# Patient Record
Sex: MALE | Race: BLACK OR AFRICAN AMERICAN | Employment: PART TIME | URBAN - METROPOLITAN AREA
[De-identification: names, ages, dates, MRNs, and addresses within clinical notes are randomized per-mention and may not be internally consistent; named-entity substitution may affect disease eponyms.]

---

## 2022-10-16 ENCOUNTER — HOSPITAL ENCOUNTER (EMERGENCY)
Age: 52
Discharge: HOME | End: 2022-10-16
Attending: EMERGENCY MEDICINE
Payer: MEDICAID

## 2022-10-16 ENCOUNTER — APPOINTMENT (OUTPATIENT)
Dept: GENERAL RADIOLOGY | Age: 52
End: 2022-10-16
Attending: EMERGENCY MEDICINE
Payer: MEDICAID

## 2022-10-16 VITALS
SYSTOLIC BLOOD PRESSURE: 130 MMHG | OXYGEN SATURATION: 94 % | HEIGHT: 72 IN | RESPIRATION RATE: 20 BRPM | TEMPERATURE: 98.2 F | BODY MASS INDEX: 28.44 KG/M2 | HEART RATE: 113 BPM | DIASTOLIC BLOOD PRESSURE: 96 MMHG | WEIGHT: 210 LBS

## 2022-10-16 DIAGNOSIS — R07.89 ATYPICAL CHEST PAIN: ICD-10-CM

## 2022-10-16 DIAGNOSIS — I49.3 MULTIFOCAL PVCS: ICD-10-CM

## 2022-10-16 DIAGNOSIS — E11.9 TYPE 2 DIABETES MELLITUS WITHOUT COMPLICATION, WITHOUT LONG-TERM CURRENT USE OF INSULIN (HCC): ICD-10-CM

## 2022-10-16 DIAGNOSIS — I50.1 PULMONARY EDEMA CARDIAC CAUSE (HCC): ICD-10-CM

## 2022-10-16 DIAGNOSIS — R06.02 SOB (SHORTNESS OF BREATH): Primary | ICD-10-CM

## 2022-10-16 LAB
ALBUMIN SERPL-MCNC: 3.5 G/DL (ref 3.4–5)
ALBUMIN/GLOB SERPL: 1.1 {RATIO} (ref 0.8–1.7)
ALP SERPL-CCNC: 114 U/L (ref 45–117)
ALT SERPL-CCNC: 55 U/L (ref 16–61)
AMPHET UR QL SCN: NEGATIVE
ANION GAP SERPL CALC-SCNC: 6 MMOL/L (ref 3–18)
APPEARANCE UR: CLEAR
AST SERPL-CCNC: 49 U/L (ref 10–38)
BACTERIA URNS QL MICRO: ABNORMAL /HPF
BARBITURATES UR QL SCN: NEGATIVE
BASOPHILS # BLD: 0 K/UL (ref 0–0.1)
BASOPHILS NFR BLD: 0 % (ref 0–2)
BENZODIAZ UR QL: NEGATIVE
BILIRUB SERPL-MCNC: 0.8 MG/DL (ref 0.2–1)
BILIRUB UR QL: NEGATIVE
BNP SERPL-MCNC: 4728 PG/ML (ref 0–900)
BUN SERPL-MCNC: 10 MG/DL (ref 7–18)
BUN/CREAT SERPL: 14 (ref 12–20)
CALCIUM SERPL-MCNC: 10.2 MG/DL (ref 8.5–10.1)
CANNABINOIDS UR QL SCN: NEGATIVE
CHLORIDE SERPL-SCNC: 107 MMOL/L (ref 100–111)
CK SERPL-CCNC: 1266 U/L (ref 39–308)
CO2 SERPL-SCNC: 26 MMOL/L (ref 21–32)
COCAINE UR QL SCN: NEGATIVE
COLOR UR: YELLOW
CREAT SERPL-MCNC: 0.74 MG/DL (ref 0.6–1.3)
DIFFERENTIAL METHOD BLD: NORMAL
EOSINOPHIL # BLD: 0.2 K/UL (ref 0–0.4)
EOSINOPHIL NFR BLD: 2 % (ref 0–5)
EPITH CASTS URNS QL MICRO: ABNORMAL /LPF (ref 0–5)
ERYTHROCYTE [DISTWIDTH] IN BLOOD BY AUTOMATED COUNT: 14.3 % (ref 11.6–14.5)
GLOBULIN SER CALC-MCNC: 3.3 G/DL (ref 2–4)
GLUCOSE SERPL-MCNC: 115 MG/DL (ref 74–99)
GLUCOSE UR STRIP.AUTO-MCNC: NEGATIVE MG/DL
HCT VFR BLD AUTO: 43 % (ref 36–48)
HDSCOM,HDSCOM: NORMAL
HGB BLD-MCNC: 14.1 G/DL (ref 13–16)
HGB UR QL STRIP: NEGATIVE
IMM GRANULOCYTES # BLD AUTO: 0 K/UL (ref 0–0.04)
IMM GRANULOCYTES NFR BLD AUTO: 0 % (ref 0–0.5)
KETONES UR QL STRIP.AUTO: NEGATIVE MG/DL
LEUKOCYTE ESTERASE UR QL STRIP.AUTO: ABNORMAL
LYMPHOCYTES # BLD: 2 K/UL (ref 0.9–3.6)
LYMPHOCYTES NFR BLD: 21 % (ref 21–52)
MCH RBC QN AUTO: 28.1 PG (ref 24–34)
MCHC RBC AUTO-ENTMCNC: 32.8 G/DL (ref 31–37)
MCV RBC AUTO: 85.8 FL (ref 78–100)
METHADONE UR QL: NEGATIVE
MONOCYTES # BLD: 0.9 K/UL (ref 0.05–1.2)
MONOCYTES NFR BLD: 10 % (ref 3–10)
NEUTS SEG # BLD: 6.3 K/UL (ref 1.8–8)
NEUTS SEG NFR BLD: 66 % (ref 40–73)
NITRITE UR QL STRIP.AUTO: NEGATIVE
NRBC # BLD: 0 K/UL (ref 0–0.01)
NRBC BLD-RTO: 0 PER 100 WBC
OPIATES UR QL: NEGATIVE
PCP UR QL: NEGATIVE
PH UR STRIP: 7.5 [PH] (ref 5–8)
PLATELET # BLD AUTO: 275 K/UL (ref 135–420)
PMV BLD AUTO: 11.8 FL (ref 9.2–11.8)
POTASSIUM SERPL-SCNC: 3.8 MMOL/L (ref 3.5–5.5)
PROT SERPL-MCNC: 6.8 G/DL (ref 6.4–8.2)
PROT UR STRIP-MCNC: 30 MG/DL
RBC # BLD AUTO: 5.01 M/UL (ref 4.35–5.65)
RBC #/AREA URNS HPF: NEGATIVE /HPF (ref 0–5)
SODIUM SERPL-SCNC: 139 MMOL/L (ref 136–145)
SP GR UR REFRACTOMETRY: 1.02 (ref 1–1.03)
TROPONIN-HIGH SENSITIVITY: 75 NG/L (ref 0–78)
TROPONIN-HIGH SENSITIVITY: 75 NG/L (ref 0–78)
UROBILINOGEN UR QL STRIP.AUTO: 1 EU/DL (ref 0.2–1)
WBC # BLD AUTO: 9.4 K/UL (ref 4.6–13.2)
WBC URNS QL MICRO: ABNORMAL /HPF (ref 0–5)

## 2022-10-16 PROCEDURE — 74011250636 HC RX REV CODE- 250/636: Performed by: EMERGENCY MEDICINE

## 2022-10-16 PROCEDURE — 71045 X-RAY EXAM CHEST 1 VIEW: CPT

## 2022-10-16 PROCEDURE — 80307 DRUG TEST PRSMV CHEM ANLYZR: CPT

## 2022-10-16 PROCEDURE — 99285 EMERGENCY DEPT VISIT HI MDM: CPT

## 2022-10-16 PROCEDURE — 82550 ASSAY OF CK (CPK): CPT

## 2022-10-16 PROCEDURE — 93005 ELECTROCARDIOGRAM TRACING: CPT

## 2022-10-16 PROCEDURE — 85025 COMPLETE CBC W/AUTO DIFF WBC: CPT

## 2022-10-16 PROCEDURE — 96374 THER/PROPH/DIAG INJ IV PUSH: CPT

## 2022-10-16 PROCEDURE — 84484 ASSAY OF TROPONIN QUANT: CPT

## 2022-10-16 PROCEDURE — 83880 ASSAY OF NATRIURETIC PEPTIDE: CPT

## 2022-10-16 PROCEDURE — 74011250637 HC RX REV CODE- 250/637: Performed by: EMERGENCY MEDICINE

## 2022-10-16 PROCEDURE — 81001 URINALYSIS AUTO W/SCOPE: CPT

## 2022-10-16 PROCEDURE — 80053 COMPREHEN METABOLIC PANEL: CPT

## 2022-10-16 RX ORDER — FUROSEMIDE 40 MG/1
20 TABLET ORAL
Status: COMPLETED | OUTPATIENT
Start: 2022-10-16 | End: 2022-10-16

## 2022-10-16 RX ORDER — FUROSEMIDE 40 MG/1
20 TABLET ORAL DAILY
Qty: 30 TABLET | Refills: 0 | Status: SHIPPED | OUTPATIENT
Start: 2022-10-16 | End: 2022-10-30

## 2022-10-16 RX ORDER — FUROSEMIDE 40 MG/1
20 TABLET ORAL DAILY
Qty: 30 TABLET | Refills: 0 | Status: SHIPPED | OUTPATIENT
Start: 2022-10-16 | End: 2022-11-05

## 2022-10-16 RX ORDER — GUAIFENESIN 100 MG/5ML
324 LIQUID (ML) ORAL
Status: COMPLETED | OUTPATIENT
Start: 2022-10-16 | End: 2022-10-16

## 2022-10-16 RX ORDER — KETOROLAC TROMETHAMINE 30 MG/ML
30 INJECTION, SOLUTION INTRAMUSCULAR; INTRAVENOUS
Status: COMPLETED | OUTPATIENT
Start: 2022-10-16 | End: 2022-10-16

## 2022-10-16 RX ORDER — METFORMIN HYDROCHLORIDE 500 MG/1
500 TABLET ORAL 2 TIMES DAILY WITH MEALS
Qty: 60 TABLET | Refills: 0 | Status: SHIPPED | OUTPATIENT
Start: 2022-10-16 | End: 2022-12-01

## 2022-10-16 RX ORDER — METFORMIN HYDROCHLORIDE 500 MG/1
TABLET ORAL 2 TIMES DAILY WITH MEALS
COMMUNITY
End: 2022-10-16 | Stop reason: SDUPTHER

## 2022-10-16 RX ADMIN — KETOROLAC TROMETHAMINE 30 MG: 30 INJECTION, SOLUTION INTRAMUSCULAR at 19:48

## 2022-10-16 RX ADMIN — FUROSEMIDE 20 MG: 40 TABLET ORAL at 21:25

## 2022-10-16 RX ADMIN — ASPIRIN 324 MG: 81 TABLET, CHEWABLE ORAL at 19:47

## 2022-10-16 NOTE — ED TRIAGE NOTES
Pt arrives w c/o shortness of breath and pain in right neck that radiates down through shoulder and arm and per patient shoots down leg. Pt states he started smoking 2 weeks ago.

## 2022-10-17 NOTE — ED PROVIDER NOTES
EMERGENCY DEPARTMENT HISTORY AND PHYSICAL EXAM    Date: 10/16/2022  Patient Name: Ruben Bello    History of Presenting Illness     Chief Complaint   Patient presents with    Shortness of Breath         History Provided By: Patient    Additional His half tory (Context):   7:33 PM  Ruben Bello is a 45 y.o. male with PMHX of diabetes, edema who presents to the emergency department C/O dyspnea. Patient states he has been having pain on the right side of his shoulder and his neck. He traces his fingers from his trapezius to his shoulder and runs down towards his hip points to his knee. Symptoms been going on for some time although he cannot characterize them fully. He describes it as a sharp shooting pain which lasts a few seconds then resolves. He has a cough. He states he has been smoking again started 2 weeks ago after previous history of smoking about half pack a day. He denies any fevers or chills or productive sputum. He denies any known cardiac history. He acknowledges doing extra physical work recently. He does not have any specific injuries. He denies use of any illegal drug use. Social History  Patient knowledges smoking cigarettes, denies alcohol or drugs. Family History  No specific family history. PCP: None    Current Facility-Administered Medications   Medication Dose Route Frequency Provider Last Rate Last Admin    furosemide (LASIX) tablet 20 mg  20 mg Oral NOW Radha Finn MD         Current Outpatient Medications   Medication Sig Dispense Refill    furosemide (Lasix) 40 mg tablet Take 0.5 Tablets by mouth daily for 20 days. 30 Tablet 0    metFORMIN (GLUCOPHAGE) 500 mg tablet Take 1 Tablet by mouth two (2) times daily (with meals). 60 Tablet 0    furosemide (Lasix) 40 mg tablet Take 0.5 Tablets by mouth daily for 30 days.  30 Tablet 0       Past History     Past Medical History:  Past Medical History:   Diagnosis Date    Diabetes [facility])        Past Surgical History:  History reviewed. No pertinent surgical history. Family History:  History reviewed. No pertinent family history. Social History:  Social History     Tobacco Use    Smoking status: Every Day     Packs/day: 0.50     Types: Cigarettes    Smokeless tobacco: Never   Substance Use Topics    Alcohol use: Not Currently    Drug use: Not Currently       Allergies:  No Known Allergies      Review of Systems   Review of Systems   Musculoskeletal:  Positive for arthralgias, myalgias and neck pain. Neck and shoulder and hips and thighs   All other systems reviewed and are negative. Physical Exam     Vitals:    10/16/22 1752 10/16/22 1902 10/16/22 2002   BP: 130/88 (!) 132/93 (!) 130/96   Pulse: (!) 50 78 (!) 113   Resp: 18 14 20   Temp: 98.2 °F (36.8 °C)     SpO2: 92% 96% 94%   Weight: 95.3 kg (210 lb)     Height: 6' (1.829 m)       Physical Exam  Vitals and nursing note reviewed. Constitutional:       General: He is not in acute distress. Appearance: He is well-developed. He is not diaphoretic. HENT:      Head: Normocephalic and atraumatic. Eyes:      General: No scleral icterus. Extraocular Movements:      Right eye: Normal extraocular motion. Left eye: Normal extraocular motion. Conjunctiva/sclera: Conjunctivae normal.      Pupils: Pupils are equal, round, and reactive to light. Neck:      Trachea: Trachea and phonation normal. No tracheal deviation. Comments: Tender trapezius working toward shoulder musculature. He does not have neck stiffness. Cardiovascular:      Rate and Rhythm: Normal rate and regular rhythm. Heart sounds: Normal heart sounds. Pulmonary:      Effort: Pulmonary effort is normal. No respiratory distress. Breath sounds: Normal breath sounds. No stridor. Abdominal:      General: Bowel sounds are normal. There is no distension. Palpations: Abdomen is soft. Tenderness: There is no abdominal tenderness. There is no rebound.    Musculoskeletal: General: No tenderness. Normal range of motion. Arms:       Cervical back: Normal range of motion. No edema, erythema, signs of trauma or rigidity. Pain with movement and muscular tenderness present. No spinous process tenderness. Normal range of motion. Legs:       Comments: Grossly unremarkable without abnormalities. He has full range of motion without shoulder joint and hip joint but with some muscular stiffness. There is no palpable fasciculations or spasms. Range of motion is full including internal and external rotation of shoulder by hip flexion extension. Distally his radial median ulnar nerve function are normal.  Dorsi and plantar flexion are normal.   Skin:     General: Skin is warm and dry. Capillary Refill: Capillary refill takes less than 2 seconds. Findings: No erythema or rash. Neurological:      Mental Status: He is alert and oriented to person, place, and time. GCS: GCS eye subscore is 4. GCS verbal subscore is 5. GCS motor subscore is 6. Cranial Nerves: Cranial nerves 2-12 are intact. No cranial nerve deficit. Motor: Motor function is intact. No weakness. Coordination: Coordination is intact. Gait: Gait is intact. Psychiatric:         Mood and Affect: Mood normal.         Behavior: Behavior normal.         Thought Content:  Thought content normal.         Judgment: Judgment normal.     Diagnostic Study Results     Labs -  Recent Results (from the past 24 hour(s))   CBC WITH AUTOMATED DIFF    Collection Time: 10/16/22  6:10 PM   Result Value Ref Range    WBC 9.4 4.6 - 13.2 K/uL    RBC 5.01 4.35 - 5.65 M/uL    HGB 14.1 13.0 - 16.0 g/dL    HCT 43.0 36.0 - 48.0 %    MCV 85.8 78.0 - 100.0 FL    MCH 28.1 24.0 - 34.0 PG    MCHC 32.8 31.0 - 37.0 g/dL    RDW 14.3 11.6 - 14.5 %    PLATELET 347 823 - 048 K/uL    MPV 11.8 9.2 - 11.8 FL    NRBC 0.0 0  WBC    ABSOLUTE NRBC 0.00 0.00 - 0.01 K/uL    NEUTROPHILS 66 40 - 73 %    LYMPHOCYTES 21 21 - 52 %    MONOCYTES 10 3 - 10 %    EOSINOPHILS 2 0 - 5 %    BASOPHILS 0 0 - 2 %    IMMATURE GRANULOCYTES 0 0.0 - 0.5 %    ABS. NEUTROPHILS 6.3 1.8 - 8.0 K/UL    ABS. LYMPHOCYTES 2.0 0.9 - 3.6 K/UL    ABS. MONOCYTES 0.9 0.05 - 1.2 K/UL    ABS. EOSINOPHILS 0.2 0.0 - 0.4 K/UL    ABS. BASOPHILS 0.0 0.0 - 0.1 K/UL    ABS. IMM. GRANS. 0.0 0.00 - 0.04 K/UL    DF AUTOMATED     METABOLIC PANEL, COMPREHENSIVE    Collection Time: 10/16/22  6:10 PM   Result Value Ref Range    Sodium 139 136 - 145 mmol/L    Potassium 3.8 3.5 - 5.5 mmol/L    Chloride 107 100 - 111 mmol/L    CO2 26 21 - 32 mmol/L    Anion gap 6 3.0 - 18 mmol/L    Glucose 115 (H) 74 - 99 mg/dL    BUN 10 7.0 - 18 MG/DL    Creatinine 0.74 0.6 - 1.3 MG/DL    BUN/Creatinine ratio 14 12 - 20      eGFR >60 >60 ml/min/1.73m2    Calcium 10.2 (H) 8.5 - 10.1 MG/DL    Bilirubin, total 0.8 0.2 - 1.0 MG/DL    ALT (SGPT) 55 16 - 61 U/L    AST (SGOT) 49 (H) 10 - 38 U/L    Alk.  phosphatase 114 45 - 117 U/L    Protein, total 6.8 6.4 - 8.2 g/dL    Albumin 3.5 3.4 - 5.0 g/dL    Globulin 3.3 2.0 - 4.0 g/dL    A-G Ratio 1.1 0.8 - 1.7     TROPONIN-HIGH SENSITIVITY    Collection Time: 10/16/22  6:10 PM   Result Value Ref Range    Troponin-High Sensitivity 75 0 - 78 ng/L   NT-PRO BNP    Collection Time: 10/16/22  6:10 PM   Result Value Ref Range    NT pro-BNP 4,728 (H) 0 - 900 PG/ML   URINALYSIS W/ RFLX MICROSCOPIC    Collection Time: 10/16/22  6:10 PM   Result Value Ref Range    Color YELLOW      Appearance CLEAR      Specific gravity 1.016 1.005 - 1.030      pH (UA) 7.5 5.0 - 8.0      Protein 30 (A) NEG mg/dL    Glucose Negative NEG mg/dL    Ketone Negative NEG mg/dL    Bilirubin Negative NEG      Blood Negative NEG      Urobilinogen 1.0 0.2 - 1.0 EU/dL    Nitrites Negative NEG      Leukocyte Esterase SMALL (A) NEG     DRUG SCREEN, URINE    Collection Time: 10/16/22  6:10 PM   Result Value Ref Range    BENZODIAZEPINES Negative NEG      BARBITURATES Negative NEG      THC (TH-CANNABINOL) Negative NEG      OPIATES Negative NEG      PCP(PHENCYCLIDINE) Negative NEG      COCAINE Negative NEG      AMPHETAMINES Negative NEG      METHADONE Negative NEG      HDSCOM (NOTE)    URINE MICROSCOPIC ONLY    Collection Time: 10/16/22  6:10 PM   Result Value Ref Range    WBC 11 to 20 0 - 5 /hpf    RBC Negative 0 - 5 /hpf    Epithelial cells 0 to 3 0 - 5 /lpf    Bacteria FEW (A) NEG /hpf   EKG, 12 LEAD, INITIAL    Collection Time: 10/16/22  6:10 PM   Result Value Ref Range    Ventricular Rate 83 BPM    Atrial Rate 83 BPM    P-R Interval 132 ms    QRS Duration 94 ms    Q-T Interval 364 ms    QTC Calculation (Bezet) 427 ms    Calculated P Axis 10 degrees    Calculated R Axis 6 degrees    Calculated T Axis 14 degrees    Diagnosis       Sinus rhythm with frequent and consecutive premature ventricular complexes  Nonspecific T wave abnormality  Abnormal ECG  No previous ECGs available     EKG, 12 LEAD, SUBSEQUENT    Collection Time: 10/16/22  7:56 PM   Result Value Ref Range    Ventricular Rate 111 BPM    Atrial Rate 111 BPM    P-R Interval 136 ms    QRS Duration 94 ms    Q-T Interval 374 ms    QTC Calculation (Bezet) 508 ms    Calculated P Axis 57 degrees    Calculated R Axis 40 degrees    Calculated T Axis 36 degrees    Diagnosis       Sinus tachycardia with occasional premature ventricular complexes  Minimal voltage criteria for LVH, may be normal variant ( Sokolow-Bob )  Nonspecific T wave abnormality  Abnormal ECG  When compared with ECG of 16-OCT-2022 18:10,  No significant change was found     CK    Collection Time: 10/16/22  8:00 PM   Result Value Ref Range    CK 1,266 (H) 39 - 308 U/L   TROPONIN-HIGH SENSITIVITY    Collection Time: 10/16/22  8:00 PM   Result Value Ref Range    Troponin-High Sensitivity 75 0 - 78 ng/L        Radiologic Studies -   XR CHEST PORT   Final Result      Cardiogenic pulmonary edema           CT Results  (Last 48 hours)      None          CXR Results  (Last 48 hours) 10/16/22 1825  XR CHEST PORT Final result    Impression:      Cardiogenic pulmonary edema           Narrative:  EXAM: CHEST RADIOGRAPH       CLINICAL INDICATION/HISTORY: Shortness of breath   -Additional: None       COMPARISON: None       TECHNIQUE: Portable frontal view of the chest       _______________       FINDINGS:       SUPPORT DEVICES: None. HEART AND MEDIASTINUM: The heart is enlarged. Remaining mediastinal contour is   unremarkable. LUNGS AND PLEURAL SPACES: There is perihilar and peripheral consolidation. BONY THORAX AND SOFT TISSUES: Unremarkable.       _______________                   Medications given in the ED-  Medications   furosemide (LASIX) tablet 20 mg (has no administration in time range)   ketorolac (TORADOL) injection 30 mg (30 mg IntraVENous Given 10/16/22 1948)   aspirin chewable tablet 324 mg (324 mg Oral Given 10/16/22 1947)         Medical Decision Making   I am the first provider for this patient. I reviewed the vital signs, available nursing notes, past medical history, past surgical history, family history and social history. Vital Signs-Reviewed the patient's vital signs. Pulse Oximetry Analysis -94% on room air    Cardiac Monitor:  Rate: 88 bpm  Rhythm: Sinus rhythm    EKG interpretation: (Preliminary)  6:10 PM    Preliminary EKG read by daytime staff has been unremarkable for acute ischemic event  7:33 PM  Normal sinus rhythm with occasional PVCs, rate 83, nonspecific ST changes, QTC is 427. EKG read by Damien Kilgore MD      Records Reviewed: NURSING NOTES AND PREVIOUS MEDICAL RECORDS    Provider Notes (Medical Decision Making):   Patient describes sharp pains after doing extra physical work. He is quite physically fit. This can be brought in associated with increased CPK, an exertional form of rhabdomyolysis. We can hydrate him however he also looks to have findings consistent with CHF possibly associated with years of physical work.   His blood pressure is a bit up but not bad. He denies drug use and his drug screens are negative. This could be primary cardiomegaly however he has no prior history in the past.  Blood sugars have been managed reasonably well in his past using metformin. Doubt this represents diabetes associated complication. I discussed ways with him for remove fluid overload however he will need to hydrate orally for his CPK. He also merits referral to cardiology. We discussed possibility for admission versus outpatient management which he prefers. This is part is reasonable does not reprimand AMA. He had repeated troponins at the upper end of acceptable normal limits which did not rise. Cardiac monitor demonstrated rate of 113 however this is picking up on PVCs indeed he did not have a tachycardia. Will refer to cardiology outpatient management. Procedures:  Procedures    ED Course:   7:21 PM: Initial assessment performed. The patients presenting problems have been discussed, and they are in agreement with the care plan formulated and outlined with them. I have encouraged them to ask questions as they arise throughout their visit. Diagnosis and Disposition       DISCHARGE NOTE:  9:15 PM  Melinda Marta  results have been reviewed with him. He has been counseled regarding his diagnosis, treatment, and plan. He verbally conveys understanding and agreement of the signs, symptoms, diagnosis, treatment and prognosis and additionally agrees to follow up as discussed. He also agrees with the care-plan and conveys that all of his questions have been answered. I have also provided discharge instructions for him that include: educational information regarding their diagnosis and treatment, and list of reasons why they would want to return to the ED prior to their follow-up appointment, should his condition change. He has been provided with education for proper emergency department utilization. CLINICAL IMPRESSION:    1. SOB (shortness of breath)    2. Pulmonary edema cardiac cause ([address]    3. Type 2 diabetes mellitus without complication, without long-term current use of insulin ([facility]    4. Atypical chest pain    5. Multifocal PVCs        PLAN:  1. D/C Home  2. Current Discharge Medication List        START taking these medications    Details   !! furosemide (Lasix) 40 mg tablet Take 0.5 Tablets by mouth daily for 20 days. Qty: 30 Tablet, Refills: 0  Start date: 10/16/2022, End date: 11/5/2022      !! furosemide (Lasix) 40 mg tablet Take 0.5 Tablets by mouth daily for 30 days. Qty: 30 Tablet, Refills: 0  Start date: 10/16/2022, End date: 11/15/2022       !! - Potential duplicate medications found. Please discuss with provider. CONTINUE these medications which have CHANGED    Details   metFORMIN (GLUCOPHAGE) 500 mg tablet Take 1 Tablet by mouth two (2) times daily (with meals). Qty: 60 Tablet, Refills: 0  Start date: 10/16/2022           3. Follow-up Information       Follow up With Specialties Details Why Contact Info    Flex Seals MD Cardiovascular Disease Physician, Internal Medicine Physician In 2 weeks  [address] [place] [place] [address]  [place] [address]  [phone]          _______________________________    This note was partially transcribed via voice recognition software. Although efforts have been made to catch any discrepancies, it may contain sound alike words, grammatical errors, or nonsensical words.

## 2022-10-19 LAB
ATRIAL RATE: 111 BPM
ATRIAL RATE: 83 BPM
CALCULATED P AXIS, ECG09: 10 DEGREES
CALCULATED P AXIS, ECG09: 57 DEGREES
CALCULATED R AXIS, ECG10: 40 DEGREES
CALCULATED R AXIS, ECG10: 6 DEGREES
CALCULATED T AXIS, ECG11: 14 DEGREES
CALCULATED T AXIS, ECG11: 36 DEGREES
DIAGNOSIS, 93000: NORMAL
DIAGNOSIS, 93000: NORMAL
P-R INTERVAL, ECG05: 132 MS
P-R INTERVAL, ECG05: 136 MS
Q-T INTERVAL, ECG07: 364 MS
Q-T INTERVAL, ECG07: 374 MS
QRS DURATION, ECG06: 94 MS
QRS DURATION, ECG06: 94 MS
QTC CALCULATION (BEZET), ECG08: 427 MS
QTC CALCULATION (BEZET), ECG08: 508 MS
VENTRICULAR RATE, ECG03: 111 BPM
VENTRICULAR RATE, ECG03: 83 BPM

## 2022-10-30 ENCOUNTER — HOSPITAL ENCOUNTER (EMERGENCY)
Age: 52
Discharge: HOME | End: 2022-10-30
Attending: EMERGENCY MEDICINE

## 2022-10-30 ENCOUNTER — APPOINTMENT (OUTPATIENT)
Dept: GENERAL RADIOLOGY | Age: 52
End: 2022-10-30
Attending: PHYSICIAN ASSISTANT

## 2022-10-30 VITALS
TEMPERATURE: 97.1 F | SYSTOLIC BLOOD PRESSURE: 99 MMHG | RESPIRATION RATE: 19 BRPM | HEIGHT: 73 IN | BODY MASS INDEX: 28.49 KG/M2 | OXYGEN SATURATION: 96 % | WEIGHT: 215 LBS | HEART RATE: 97 BPM | DIASTOLIC BLOOD PRESSURE: 73 MMHG

## 2022-10-30 DIAGNOSIS — M62.838 MUSCLE SPASM: ICD-10-CM

## 2022-10-30 DIAGNOSIS — M54.2 CERVICAL PAIN (NECK): Primary | ICD-10-CM

## 2022-10-30 PROCEDURE — 72050 X-RAY EXAM NECK SPINE 4/5VWS: CPT

## 2022-10-30 PROCEDURE — 99283 EMERGENCY DEPT VISIT LOW MDM: CPT

## 2022-10-30 RX ORDER — NAPROXEN 500 MG/1
500 TABLET ORAL 2 TIMES DAILY WITH MEALS
Qty: 20 TABLET | Refills: 0 | Status: SHIPPED | OUTPATIENT
Start: 2022-10-30 | End: 2022-11-09

## 2022-10-30 RX ORDER — METHOCARBAMOL 750 MG/1
750 TABLET, FILM COATED ORAL
Qty: 20 TABLET | Refills: 0 | Status: SHIPPED | OUTPATIENT
Start: 2022-10-30 | End: 2022-12-01

## 2022-10-30 NOTE — ED PROVIDER NOTES
EMERGENCY DEPARTMENT HISTORY AND PHYSICAL EXAM    Date: 10/30/2022  Patient Name: Arian Magana    History of Presenting Illness     Time Seen:6:37 PM    Chief Complaint   Patient presents with    Neck Pain       History Provided By: Patient    Additional History (Context):   Arian Magana is a 45 y.o. male who presents to the emergency room with c/o neck pain for the last month. Denies any injury. However he does believe that he may have slept on it wrong that initiated the discomfort. Also works as a . Often has his head and neck in awkward positions. Pain is mostly along the right side of neck with radiation out towards the right shoulder. Aching pain. Worse with movement. Denies any numbness, tingling or weakness in his upper extremities. No history of bulging disc or herniated disks in his neck. No headaches. PCP: None    Current Outpatient Medications   Medication Sig Dispense Refill    naproxen (Naprosyn) 500 mg tablet Take 1 Tablet by mouth two (2) times daily (with meals) for 10 days. 20 Tablet 0    methocarbamoL (Robaxin-750) 750 mg tablet Take 1 Tablet by mouth every six to eight (6-8) hours as needed for Muscle Spasm(s). 20 Tablet 0    furosemide (Lasix) 40 mg tablet Take 0.5 Tablets by mouth daily for 20 days. 30 Tablet 0    metFORMIN (GLUCOPHAGE) 500 mg tablet Take 1 Tablet by mouth two (2) times daily (with meals). 60 Tablet 0       Past History     Past Medical History:  Past Medical History:   Diagnosis Date    Diabetes ([address]        Past Surgical History:  History reviewed. No pertinent surgical history. Family History:  History reviewed. No pertinent family history.     Social History:  Social History     Tobacco Use    Smoking status: Every Day     Packs/day: 1.00     Types: Cigarettes    Smokeless tobacco: Never   Substance Use Topics    Alcohol use: Not Currently    Drug use: Not Currently       Allergies:  No Known Allergies      Review of Systems   Review of Systems Musculoskeletal:  Positive for neck pain and neck stiffness. Negative for back pain. Neurological:  Negative for dizziness, weakness, numbness and headaches. All other systems reviewed and are negative. Physical Exam     Vitals:    10/30/22 1728   BP: 99/73   Pulse: 97   Resp: 19   Temp: 97.1 °F (36.2 °C)   SpO2: 96%   Weight: 97.5 kg (215 lb)   Height: 6' 1\" (1.854 m)     Physical Exam  Vitals and nursing note reviewed. Constitutional:       Appearance: Normal appearance. He is normal weight. HENT:      Head: Normocephalic and atraumatic. Neck:        Comments: Tenderness to palpation of the cervical spinous processes and extending out into the right paracervical musculature, right trapezius muscle extending out towards the shoulder. Somewhat diminished range of motion of the neck secondary to pain and stiffness. Patient has full range of motion in both upper extremities with strength 5/5 equal bilaterally. Cardiovascular:      Rate and Rhythm: Normal rate and regular rhythm. Heart sounds: Normal heart sounds. Pulmonary:      Effort: Pulmonary effort is normal.      Breath sounds: Normal breath sounds. Musculoskeletal:      Cervical back: No signs of trauma, rigidity, torticollis or crepitus. Pain with movement, spinous process tenderness and muscular tenderness present. Decreased range of motion. Skin:     General: Skin is warm and dry. Neurological:      Mental Status: He is alert and oriented to person, place, and time. Nursing note and vitals reviewed      Diagnostic Study Results     Labs -   No results found for this or any previous visit (from the past 24 hour(s)). Radiologic Studies   XR SPINE CERV 4 OR 5 V    (Results Pending)     CT Results  (Last 48 hours)      None          CXR Results  (Last 48 hours)      None              Medical Decision Making   I am the first provider for this patient.     I reviewed the vital signs, available nursing notes, past medical history, past surgical history, family history and social history. Vital Signs-Reviewed the patient's vital signs. Records Reviewed: Nursing Notes    DDX:  Cervical degenerative arthritis, cervical muscle spasm, degenerative disc disease. Doubt radicular pain    Procedures:  Procedures    ED Course:   Initial assessment performed. The patients presenting problems have been discussed, and they are in agreement with the care plan formulated and outlined with them. I have encouraged them to ask questions as they arise throughout their visit. ED Physician Discussion Note:   Wet reading on x-ray shows evidence of reversal of normal curvature of the cervical spine concerning for muscle spasms. There is also some evidence of degenerative changes in the neck. Patient was informed of his x-ray results    Plan will be to place patient on anti-inflammatory muscle relaxer. He wants to be referred to see a back specialist because of the length of time he has been dealing with the neck pain. Patient is already been doing symptomatic relief at home. We will have him continue heat, ice, gentle stretching exercise. Works as a . Did not want a work note. Zander Lay PA-C, am the primary clinician on record for this patient. Diagnosis and Disposition       DISCHARGE NOTE:  Noah Myers  results have been reviewed with him. He has been counseled regarding his diagnosis, treatment, and plan. He verbally conveys understanding and agreement of the signs, symptoms, diagnosis, treatment and prognosis and additionally agrees to follow up as discussed. He also agrees with the care-plan and conveys that all of his questions have been answered.   I have also provided discharge instructions for him that include: educational information regarding their diagnosis and treatment, and list of reasons why they would want to return to the ED prior to their follow-up appointment, should his condition change. He has been provided with education for proper emergency department utilization. CLINICAL IMPRESSION:    1. Cervical pain (neck)    2. Muscle spasm        PLAN:  1. D/C Home  2. Current Discharge Medication List        START taking these medications    Details   naproxen (Naprosyn) 500 mg tablet Take 1 Tablet by mouth two (2) times daily (with meals) for 10 days. Qty: 20 Tablet, Refills: 0  Start date: 10/30/2022, End date: 11/9/2022      methocarbamoL (Robaxin-750) 750 mg tablet Take 1 Tablet by mouth every six to eight (6-8) hours as needed for Muscle Spasm(s). Qty: 20 Tablet, Refills: 0  Start date: 10/30/2022           3. Follow-up Information       Follow up With Specialties Details Why Contact Info    Laura Jacques MD Orthopedic Surgery Schedule an appointment as soon as possible for a visit   [address] [phone]      [facility] EMERGENCY DEPT Emergency Medicine  If symptoms worsen, As needed [address] [phone]          ____________________________________     Please note that this dictation was completed with [employer], the computer voice recognition software. Quite often unanticipated grammatical, syntax, homophones, and other interpretive errors are inadvertently transcribed by the computer software. Please disregard these errors. Please excuse any errors that have escaped final proofreading.

## 2022-10-30 NOTE — DISCHARGE INSTRUCTIONS
Rest  Ice/heat  Gentle stretching and exercise  Medication as prescribed for inflammation and muscle spasm  Referral to see orthopedic surgery

## 2022-11-27 ENCOUNTER — APPOINTMENT (OUTPATIENT)
Dept: CT IMAGING | Age: 52
DRG: 286 | End: 2022-11-27
Attending: PHYSICIAN ASSISTANT

## 2022-11-27 ENCOUNTER — HOSPITAL ENCOUNTER (INPATIENT)
Age: 52
LOS: 4 days | Discharge: HOME | DRG: 286 | End: 2022-12-01
Attending: STUDENT IN AN ORGANIZED HEALTH CARE EDUCATION/TRAINING PROGRAM | Admitting: INTERNAL MEDICINE

## 2022-11-27 ENCOUNTER — APPOINTMENT (OUTPATIENT)
Dept: GENERAL RADIOLOGY | Age: 52
DRG: 286 | End: 2022-11-27
Attending: STUDENT IN AN ORGANIZED HEALTH CARE EDUCATION/TRAINING PROGRAM

## 2022-11-27 DIAGNOSIS — I42.9 CARDIOMYOPATHY (HCC): ICD-10-CM

## 2022-11-27 DIAGNOSIS — I50.9 ACUTE CONGESTIVE HEART FAILURE, UNSPECIFIED HEART FAILURE TYPE (HCC): Primary | ICD-10-CM

## 2022-11-27 DIAGNOSIS — E11.69 TYPE 2 DIABETES MELLITUS WITH OTHER SPECIFIED COMPLICATION, WITHOUT LONG-TERM CURRENT USE OF INSULIN (HCC): ICD-10-CM

## 2022-11-27 DIAGNOSIS — I50.9 CHF (CONGESTIVE HEART FAILURE) (HCC): ICD-10-CM

## 2022-11-27 DIAGNOSIS — I34.0 NONRHEUMATIC MITRAL VALVE REGURGITATION: ICD-10-CM

## 2022-11-27 DIAGNOSIS — R09.02 HYPOXIC: ICD-10-CM

## 2022-11-27 LAB
ALBUMIN SERPL-MCNC: 3.5 G/DL (ref 3.4–5)
ALBUMIN/GLOB SERPL: 0.9 {RATIO} (ref 0.8–1.7)
ALP SERPL-CCNC: 117 U/L (ref 45–117)
ALT SERPL-CCNC: 45 U/L (ref 16–61)
AMPHET UR QL SCN: NEGATIVE
ANION GAP SERPL CALC-SCNC: 5 MMOL/L (ref 3–18)
APPEARANCE UR: CLEAR
ARTERIAL PATENCY WRIST A: POSITIVE
AST SERPL-CCNC: 47 U/L (ref 10–38)
ATRIAL RATE: 100 BPM
BACTERIA URNS QL MICRO: ABNORMAL /HPF
BARBITURATES UR QL SCN: NEGATIVE
BASE EXCESS BLD CALC-SCNC: 0.4 MMOL/L
BASOPHILS # BLD: 0 K/UL (ref 0–0.1)
BASOPHILS NFR BLD: 0 % (ref 0–2)
BDY SITE: ABNORMAL
BENZODIAZ UR QL: NEGATIVE
BILIRUB SERPL-MCNC: 0.9 MG/DL (ref 0.2–1)
BILIRUB UR QL: NEGATIVE
BNP SERPL-MCNC: 4072 PG/ML (ref 0–900)
BUN SERPL-MCNC: 11 MG/DL (ref 7–18)
BUN/CREAT SERPL: 13 (ref 12–20)
CALCIUM SERPL-MCNC: 10.4 MG/DL (ref 8.5–10.1)
CALCULATED P AXIS, ECG09: 71 DEGREES
CALCULATED R AXIS, ECG10: 46 DEGREES
CALCULATED T AXIS, ECG11: 60 DEGREES
CANNABINOIDS UR QL SCN: NEGATIVE
CHLORIDE SERPL-SCNC: 109 MMOL/L (ref 100–111)
CO2 SERPL-SCNC: 26 MMOL/L (ref 21–32)
COCAINE UR QL SCN: NEGATIVE
COLOR UR: YELLOW
CREAT SERPL-MCNC: 0.83 MG/DL (ref 0.6–1.3)
D DIMER PPP FEU-MCNC: 0.65 UG/ML(FEU)
DIAGNOSIS, 93000: NORMAL
DIFFERENTIAL METHOD BLD: NORMAL
EOSINOPHIL # BLD: 0.3 K/UL (ref 0–0.4)
EOSINOPHIL NFR BLD: 4 % (ref 0–5)
EPITH CASTS URNS QL MICRO: ABNORMAL /LPF (ref 0–5)
ERYTHROCYTE [DISTWIDTH] IN BLOOD BY AUTOMATED COUNT: 13.7 % (ref 11.6–14.5)
FLUAV RNA SPEC QL NAA+PROBE: NOT DETECTED
FLUBV RNA SPEC QL NAA+PROBE: NOT DETECTED
GAS FLOW.O2 O2 DELIVERY SYS: ABNORMAL L/MIN
GLOBULIN SER CALC-MCNC: 3.7 G/DL (ref 2–4)
GLUCOSE BLD STRIP.AUTO-MCNC: 139 MG/DL (ref 70–110)
GLUCOSE SERPL-MCNC: 132 MG/DL (ref 74–99)
GLUCOSE UR STRIP.AUTO-MCNC: NEGATIVE MG/DL
HCO3 BLD-SCNC: 24.7 MMOL/L (ref 22–26)
HCT VFR BLD AUTO: 41.6 % (ref 36–48)
HDSCOM,HDSCOM: NORMAL
HGB BLD-MCNC: 13.9 G/DL (ref 13–16)
HGB UR QL STRIP: NEGATIVE
IMM GRANULOCYTES # BLD AUTO: 0 K/UL (ref 0–0.04)
IMM GRANULOCYTES NFR BLD AUTO: 0 % (ref 0–0.5)
KETONES UR QL STRIP.AUTO: NEGATIVE MG/DL
LACTATE BLD-SCNC: 1.81 MMOL/L (ref 0.4–2)
LEUKOCYTE ESTERASE UR QL STRIP.AUTO: ABNORMAL
LYMPHOCYTES # BLD: 1.7 K/UL (ref 0.9–3.6)
LYMPHOCYTES NFR BLD: 22 % (ref 21–52)
MCH RBC QN AUTO: 28.7 PG (ref 24–34)
MCHC RBC AUTO-ENTMCNC: 33.4 G/DL (ref 31–37)
MCV RBC AUTO: 85.8 FL (ref 78–100)
METHADONE UR QL: NEGATIVE
MONOCYTES # BLD: 0.7 K/UL (ref 0.05–1.2)
MONOCYTES NFR BLD: 10 % (ref 3–10)
MUCOUS THREADS URNS QL MICRO: ABNORMAL /LPF
NEUTS SEG # BLD: 4.8 K/UL (ref 1.8–8)
NEUTS SEG NFR BLD: 64 % (ref 40–73)
NITRITE UR QL STRIP.AUTO: NEGATIVE
NRBC # BLD: 0 K/UL (ref 0–0.01)
NRBC BLD-RTO: 0 PER 100 WBC
OPIATES UR QL: NEGATIVE
P-R INTERVAL, ECG05: 136 MS
PCO2 BLD: 38.3 MMHG (ref 35–45)
PCP UR QL: NEGATIVE
PH BLD: 7.42 [PH] (ref 7.35–7.45)
PH UR STRIP: 6.5 [PH] (ref 5–8)
PLATELET # BLD AUTO: 288 K/UL (ref 135–420)
PMV BLD AUTO: 11.1 FL (ref 9.2–11.8)
PO2 BLD: 68 MMHG (ref 80–100)
POTASSIUM SERPL-SCNC: 4 MMOL/L (ref 3.5–5.5)
PROT SERPL-MCNC: 7.2 G/DL (ref 6.4–8.2)
PROT UR STRIP-MCNC: NEGATIVE MG/DL
Q-T INTERVAL, ECG07: 360 MS
QRS DURATION, ECG06: 100 MS
QTC CALCULATION (BEZET), ECG08: 464 MS
RBC # BLD AUTO: 4.85 M/UL (ref 4.35–5.65)
RBC #/AREA URNS HPF: ABNORMAL /HPF (ref 0–5)
SAO2 % BLD: 93.7 % (ref 92–97)
SARS-COV-2, COV2: NOT DETECTED
SERVICE CMNT-IMP: ABNORMAL
SODIUM SERPL-SCNC: 140 MMOL/L (ref 136–145)
SP GR UR REFRACTOMETRY: 1.01 (ref 1–1.03)
SPECIMEN TYPE: ABNORMAL
TROPONIN-HIGH SENSITIVITY: 67 NG/L (ref 0–78)
TROPONIN-HIGH SENSITIVITY: 71 NG/L (ref 0–78)
UROBILINOGEN UR QL STRIP.AUTO: 1 EU/DL (ref 0.2–1)
VENTRICULAR RATE, ECG03: 100 BPM
WBC # BLD AUTO: 7.6 K/UL (ref 4.6–13.2)
WBC URNS QL MICRO: ABNORMAL /HPF (ref 0–5)

## 2022-11-27 PROCEDURE — 85025 COMPLETE CBC W/AUTO DIFF WBC: CPT

## 2022-11-27 PROCEDURE — 87086 URINE CULTURE/COLONY COUNT: CPT

## 2022-11-27 PROCEDURE — 80053 COMPREHEN METABOLIC PANEL: CPT

## 2022-11-27 PROCEDURE — 87636 SARSCOV2 & INF A&B AMP PRB: CPT

## 2022-11-27 PROCEDURE — 82962 GLUCOSE BLOOD TEST: CPT

## 2022-11-27 PROCEDURE — 93005 ELECTROCARDIOGRAM TRACING: CPT

## 2022-11-27 PROCEDURE — 83036 HEMOGLOBIN GLYCOSYLATED A1C: CPT

## 2022-11-27 PROCEDURE — 74011000636 HC RX REV CODE- 636: Performed by: STUDENT IN AN ORGANIZED HEALTH CARE EDUCATION/TRAINING PROGRAM

## 2022-11-27 PROCEDURE — 74011250636 HC RX REV CODE- 250/636: Performed by: PHYSICIAN ASSISTANT

## 2022-11-27 PROCEDURE — 96374 THER/PROPH/DIAG INJ IV PUSH: CPT

## 2022-11-27 PROCEDURE — 71275 CT ANGIOGRAPHY CHEST: CPT

## 2022-11-27 PROCEDURE — 65270000046 HC RM TELEMETRY

## 2022-11-27 PROCEDURE — 99285 EMERGENCY DEPT VISIT HI MDM: CPT

## 2022-11-27 PROCEDURE — 87491 CHLMYD TRACH DNA AMP PROBE: CPT

## 2022-11-27 PROCEDURE — 96375 TX/PRO/DX INJ NEW DRUG ADDON: CPT

## 2022-11-27 PROCEDURE — 83880 ASSAY OF NATRIURETIC PEPTIDE: CPT

## 2022-11-27 PROCEDURE — 80307 DRUG TEST PRSMV CHEM ANLYZR: CPT

## 2022-11-27 PROCEDURE — 74011000250 HC RX REV CODE- 250: Performed by: INTERNAL MEDICINE

## 2022-11-27 PROCEDURE — 84484 ASSAY OF TROPONIN QUANT: CPT

## 2022-11-27 PROCEDURE — 71045 X-RAY EXAM CHEST 1 VIEW: CPT

## 2022-11-27 PROCEDURE — 83605 ASSAY OF LACTIC ACID: CPT

## 2022-11-27 PROCEDURE — 85379 FIBRIN DEGRADATION QUANT: CPT

## 2022-11-27 PROCEDURE — 81001 URINALYSIS AUTO W/SCOPE: CPT

## 2022-11-27 RX ORDER — DEXTROSE MONOHYDRATE 100 MG/ML
0-250 INJECTION, SOLUTION INTRAVENOUS AS NEEDED
Status: DISCONTINUED | OUTPATIENT
Start: 2022-11-27 | End: 2022-12-01 | Stop reason: HOSPADM

## 2022-11-27 RX ORDER — ACETAMINOPHEN 650 MG/1
650 SUPPOSITORY RECTAL
Status: DISCONTINUED | OUTPATIENT
Start: 2022-11-27 | End: 2022-12-01 | Stop reason: HOSPADM

## 2022-11-27 RX ORDER — POTASSIUM CHLORIDE 1500 MG/1
40 TABLET, FILM COATED, EXTENDED RELEASE ORAL AS NEEDED
Status: DISCONTINUED | OUTPATIENT
Start: 2022-11-27 | End: 2022-12-01 | Stop reason: HOSPADM

## 2022-11-27 RX ORDER — ENOXAPARIN SODIUM 100 MG/ML
40 INJECTION SUBCUTANEOUS DAILY
Status: DISCONTINUED | OUTPATIENT
Start: 2022-11-28 | End: 2022-12-01 | Stop reason: HOSPADM

## 2022-11-27 RX ORDER — ONDANSETRON 2 MG/ML
4 INJECTION INTRAMUSCULAR; INTRAVENOUS
Status: DISCONTINUED | OUTPATIENT
Start: 2022-11-27 | End: 2022-12-01 | Stop reason: HOSPADM

## 2022-11-27 RX ORDER — POLYETHYLENE GLYCOL 3350 17 G/17G
17 POWDER, FOR SOLUTION ORAL DAILY PRN
Status: DISCONTINUED | OUTPATIENT
Start: 2022-11-27 | End: 2022-12-01 | Stop reason: HOSPADM

## 2022-11-27 RX ORDER — SODIUM CHLORIDE 0.9 % (FLUSH) 0.9 %
5-40 SYRINGE (ML) INJECTION EVERY 8 HOURS
Status: DISCONTINUED | OUTPATIENT
Start: 2022-11-27 | End: 2022-12-01 | Stop reason: HOSPADM

## 2022-11-27 RX ORDER — ONDANSETRON 4 MG/1
4 TABLET, ORALLY DISINTEGRATING ORAL
Status: DISCONTINUED | OUTPATIENT
Start: 2022-11-27 | End: 2022-12-01 | Stop reason: HOSPADM

## 2022-11-27 RX ORDER — FUROSEMIDE 10 MG/ML
40 INJECTION INTRAMUSCULAR; INTRAVENOUS
Status: COMPLETED | OUTPATIENT
Start: 2022-11-27 | End: 2022-11-27

## 2022-11-27 RX ORDER — MAGNESIUM SULFATE 100 %
4 CRYSTALS MISCELLANEOUS AS NEEDED
Status: DISCONTINUED | OUTPATIENT
Start: 2022-11-27 | End: 2022-12-01 | Stop reason: HOSPADM

## 2022-11-27 RX ORDER — FUROSEMIDE 10 MG/ML
40 INJECTION INTRAMUSCULAR; INTRAVENOUS EVERY 12 HOURS
Status: COMPLETED | OUTPATIENT
Start: 2022-11-28 | End: 2022-11-29

## 2022-11-27 RX ORDER — ACETAMINOPHEN 325 MG/1
650 TABLET ORAL
Status: DISCONTINUED | OUTPATIENT
Start: 2022-11-27 | End: 2022-12-01 | Stop reason: HOSPADM

## 2022-11-27 RX ORDER — INSULIN LISPRO 100 [IU]/ML
INJECTION, SOLUTION INTRAVENOUS; SUBCUTANEOUS
Status: DISCONTINUED | OUTPATIENT
Start: 2022-11-27 | End: 2022-12-01 | Stop reason: HOSPADM

## 2022-11-27 RX ORDER — NALOXONE HYDROCHLORIDE 0.4 MG/ML
0.2 INJECTION, SOLUTION INTRAMUSCULAR; INTRAVENOUS; SUBCUTANEOUS
Status: COMPLETED | OUTPATIENT
Start: 2022-11-27 | End: 2022-11-27

## 2022-11-27 RX ORDER — POTASSIUM CHLORIDE 7.45 MG/ML
10 INJECTION INTRAVENOUS AS NEEDED
Status: DISCONTINUED | OUTPATIENT
Start: 2022-11-27 | End: 2022-12-01 | Stop reason: HOSPADM

## 2022-11-27 RX ORDER — MAGNESIUM SULFATE HEPTAHYDRATE 40 MG/ML
2 INJECTION, SOLUTION INTRAVENOUS AS NEEDED
Status: DISCONTINUED | OUTPATIENT
Start: 2022-11-27 | End: 2022-12-01 | Stop reason: HOSPADM

## 2022-11-27 RX ORDER — SODIUM CHLORIDE 0.9 % (FLUSH) 0.9 %
5-40 SYRINGE (ML) INJECTION AS NEEDED
Status: DISCONTINUED | OUTPATIENT
Start: 2022-11-27 | End: 2022-12-01 | Stop reason: HOSPADM

## 2022-11-27 RX ADMIN — NALOXONE HYDROCHLORIDE 0.2 MG: 0.4 INJECTION, SOLUTION INTRAMUSCULAR; INTRAVENOUS; SUBCUTANEOUS at 16:11

## 2022-11-27 RX ADMIN — FUROSEMIDE 40 MG: 10 INJECTION, SOLUTION INTRAMUSCULAR; INTRAVENOUS at 16:57

## 2022-11-27 RX ADMIN — IOPAMIDOL 75 ML: 755 INJECTION, SOLUTION INTRAVENOUS at 20:14

## 2022-11-27 RX ADMIN — SODIUM CHLORIDE, PRESERVATIVE FREE 10 ML: 5 INJECTION INTRAVENOUS at 23:26

## 2022-11-27 NOTE — Clinical Note
TRANSFER - IN REPORT:     Verbal report received from: HOLDING RN. Report consisted of patient's Situation, Background, Assessment and   Recommendations(SBAR). Opportunity for questions and clarification was provided. Assessment completed upon patient's arrival to unit and care assumed. Patient transported with a Registered Nurse and [address] [place] [address] / [facility] [employer].

## 2022-11-27 NOTE — ED PROVIDER NOTES
EMERGENCY DEPARTMENT HISTORY & PHYSICAL EXAM    [facility]  11/27/2022, 3:43 PM    Clinical Impression:  1. Acute congestive heart failure, unspecified heart failure type ([address]    2. Hypoxic    3. Type 2 diabetes mellitus with other specified complication, without long-term current use of insulin (HCC)        Assessment/Differential Diagnosis:     Ddx ACS, flu, covid, pneumonia, CHF, PE, infection, all considered. ED Course:   Initial assessment performed. The patients presenting problems have been discussed, and they are in agreement with the care plan formulated and outlined with them. I have encouraged them to ask questions as they arise throughout their visit. Pt here with 3-4 days of CP described as pain with cough, with associated SOB. No fever, chills, myalgia, rhinorrhea, ST. No abd pain. No swelling. No exertional symptoms. Exam with pt sleeping, easily aroused. Exam with slight crackles heard at bases. Good air mvmt. No cough, rhonchi, wheeze. Benign abd. No leg swelling, no calf tenderness. Will check covid, flu, bldwork, UA, urine drug, EKG, CXR  Pt appears stable, no pain currently    Pt initially with normal 02sat on RA. Dropped to 86-88%. Given his somnolence narcan given, 02 sat went from 88%-94%    02 again dropped to 88%, NC 02 4L started, no CP, no SOB  Workup with cxr with vascular prominence  EKG reviewed with Dr. Kassandra Godfrey, ED attending agrees with lasix  Lasix 40mg ordered    Ddimer elevated, will add CTA chest  proBNP 4000    Pt responded nicely to lasix, output over 800ml urine, maintaining 02 sat without oxygen. Awaiting CTA  Review of records, pt seen here in 10/2022, similar symptoms without hypoxia, responded to lasix, d/c with lasix but pt did not follow up. No workup for his CHF to date. Call placed to hospitalist to review case/possible admission    7:41 PM  Discussed with Dr. Lindsey Soares, will admit. Aware of CTA pending. Request Cardiology consult. 7:49 PM  Discussed with Dr. Leslie Joaquin, cardiology, who agrees with diuresis for now, will follow. Medical Chart Review:  I have reviewed triage nursing documentation. Review of old medical records with the following pertinent information:       Disposition:  admit. Chief Complaint   Patient presents with    Chest Pain     HPI:    The history is provided by patient. No  used. Ajay Biswas is a 45 y.o. male presenting to the Emergency Department with complaints of 4 days of shortness of breath and chest pain. With further discussion patient states he has had a cough, productive of colored phlegm. He also notices when he coughs he has chest pain. He also noticed some shortness of breath accompanied with his cough. He denies any exertional chest pain or shortness of breath. There is been no fever, chills or flulike illness. He denies any rhinorrhea, sore throat, rash, neck stiffness, n/v or abd pain. No urinary symptoms. No leg swelling, calf pain. Pt states he has DM, taking metformin. Does not have PCP. I have reviewed all PMHX, FMHX and Social Hx as entered into the medical record in the chart below using the Epic Template. Review of Systems:  Constitutional: neg for fever, chills. No trauma. ENT:  neg for URI symptoms  Respiratory:  + for cough, + for shortness of breath  Cardiovascular:  + for chest pain. Neg for pedal/LE edema. GI:  neg for abdominal pain. No n/v/d.  :  No urinary symptoms. No Flank pain. MSK: neg for back pain. Integumentary: no rashes, or skin trauma  Neurological: neg for headaches  All other systems reviewed negative with exception of positives in ROS and HPI. Past Medical History:  Past Medical History:   Diagnosis Date    Diabetes [facility])        Past Surgical History:  History reviewed. No pertinent surgical history. Family History:  History reviewed. No pertinent family history.     Social History:  Social History     Tobacco Use    Smoking status: Every Day     Packs/day: 1.00     Types: Cigarettes    Smokeless tobacco: Never   Substance Use Topics    Alcohol use: Not Currently    Drug use: Not Currently       Allergies:  No Known Allergies    Vital Signs:  Vitals:    11/27/22 1748 11/27/22 1826 11/27/22 1834 11/27/22 1859   BP:       Pulse: 91 84 87 99   Resp: 17 14 14 19   Temp:       SpO2: 95% 95% 94% 96%   Weight:       Height:         Physical Exam:  Vital Signs Reviewed. Nursing Notes Reviewed. Constitutional:  Well developed, well nourished patient. Appearance and behavior are age and situation appropriate. Pt is sleeping, but easily aroused. Alert & oriented. Answers my questions appropriately. Head: Normocephalic, Atraumatic  Eyes: Conjunctiva clear, lids normal. Sclera anicteric. ENT:hearing grossly intact  Neck:  supple, FROM, nontender  Lungs: No respiratory distress. Lungs slight crackles heard bases, no rhonchi, wheeze. Good air mvmt bilat. No cough. No pain with inspiration. CV:  RR&R without murmur  Thorax: no chest wall tenderness. No signs of trauma. Skin without rash. Abdomen: soft, no tenderness with palpation. BS normal throughout. No organomegaly appreciated. No mass. Extremities:  no tenderness with palpation to UE or LE. No pedal edema. Normal cap refill, bilat equal.   Neuro:  A&O x 3. CN II-XII grossly intact. No gross neuro deficits. Skin:  Warm, dry, no rash. Skin intact. Spine:  No pain with palpation over cervical, thoracic or lumbar spine.     Diagnostics:    Labs -     Recent Results (from the past 12 hour(s))   EKG, 12 LEAD, INITIAL    Collection Time: 11/27/22  2:56 PM   Result Value Ref Range    Ventricular Rate 100 BPM    Atrial Rate 100 BPM    P-R Interval 136 ms    QRS Duration 100 ms    Q-T Interval 360 ms    QTC Calculation (Bezet) 464 ms    Calculated P Axis 71 degrees    Calculated R Axis 46 degrees    Calculated T Axis 60 degrees    Diagnosis Normal sinus rhythm  Possible Left atrial enlargement  Left ventricular hypertrophy ( Sokolow-Bob , [place] product )  Nonspecific T wave abnormality  Prolonged QT  Abnormal ECG  When compared with ECG of 16-OCT-2022 19:56,  premature ventricular complexes are no longer present  ST elevation now present in Anterior leads     CBC WITH AUTOMATED DIFF    Collection Time: 11/27/22  3:12 PM   Result Value Ref Range    WBC 7.6 4.6 - 13.2 K/uL    RBC 4.85 4.35 - 5.65 M/uL    HGB 13.9 13.0 - 16.0 g/dL    HCT 41.6 36.0 - 48.0 %    MCV 85.8 78.0 - 100.0 FL    MCH 28.7 24.0 - 34.0 PG    MCHC 33.4 31.0 - 37.0 g/dL    RDW 13.7 11.6 - 14.5 %    PLATELET 122 394 - 164 K/uL    MPV 11.1 9.2 - 11.8 FL    NRBC 0.0 0  WBC    ABSOLUTE NRBC 0.00 0.00 - 0.01 K/uL    NEUTROPHILS 64 40 - 73 %    LYMPHOCYTES 22 21 - 52 %    MONOCYTES 10 3 - 10 %    EOSINOPHILS 4 0 - 5 %    BASOPHILS 0 0 - 2 %    IMMATURE GRANULOCYTES 0 0.0 - 0.5 %    ABS. NEUTROPHILS 4.8 1.8 - 8.0 K/UL    ABS. LYMPHOCYTES 1.7 0.9 - 3.6 K/UL    ABS. MONOCYTES 0.7 0.05 - 1.2 K/UL    ABS. EOSINOPHILS 0.3 0.0 - 0.4 K/UL    ABS. BASOPHILS 0.0 0.0 - 0.1 K/UL    ABS. IMM. GRANS. 0.0 0.00 - 0.04 K/UL    DF AUTOMATED     METABOLIC PANEL, COMPREHENSIVE    Collection Time: 11/27/22  3:12 PM   Result Value Ref Range    Sodium 140 136 - 145 mmol/L    Potassium 4.0 3.5 - 5.5 mmol/L    Chloride 109 100 - 111 mmol/L    CO2 26 21 - 32 mmol/L    Anion gap 5 3.0 - 18 mmol/L    Glucose 132 (H) 74 - 99 mg/dL    BUN 11 7.0 - 18 MG/DL    Creatinine 0.83 0.6 - 1.3 MG/DL    BUN/Creatinine ratio 13 12 - 20      eGFR >60 >60 ml/min/1.73m2    Calcium 10.4 (H) 8.5 - 10.1 MG/DL    Bilirubin, total 0.9 0.2 - 1.0 MG/DL    ALT (SGPT) 45 16 - 61 U/L    AST (SGOT) 47 (H) 10 - 38 U/L    Alk.  phosphatase 117 45 - 117 U/L    Protein, total 7.2 6.4 - 8.2 g/dL    Albumin 3.5 3.4 - 5.0 g/dL    Globulin 3.7 2.0 - 4.0 g/dL    A-G Ratio 0.9 0.8 - 1.7     TROPONIN-HIGH SENSITIVITY    Collection Time: 11/27/22 3:12 PM   Result Value Ref Range    Troponin-High Sensitivity 67 0 - 78 ng/L   D DIMER    Collection Time: 11/27/22  3:12 PM   Result Value Ref Range    D DIMER 0.65 (H) <0.46 ug/ml(FEU)   NT-PRO BNP    Collection Time: 11/27/22  3:12 PM   Result Value Ref Range    NT pro-BNP 4,072 (H) 0 - 900 PG/ML   COVID-19 WITH INFLUENZA A/B    Collection Time: 11/27/22  4:00 PM   Result Value Ref Range    SARS-CoV-2 by PCR Not detected NOTD      Influenza A by PCR Not detected NOTD      Influenza B by PCR Not detected NOTD     URINALYSIS W/ RFLX MICROSCOPIC    Collection Time: 11/27/22  5:50 PM   Result Value Ref Range    Color YELLOW      Appearance CLEAR      Specific gravity 1.013 1.005 - 1.030      pH (UA) 6.5 5.0 - 8.0      Protein Negative NEG mg/dL    Glucose Negative NEG mg/dL    Ketone Negative NEG mg/dL    Bilirubin Negative NEG      Blood Negative NEG      Urobilinogen 1.0 0.2 - 1.0 EU/dL    Nitrites Negative NEG      Leukocyte Esterase SMALL (A) NEG     DRUG SCREEN, URINE    Collection Time: 11/27/22  5:50 PM   Result Value Ref Range    BENZODIAZEPINES Negative NEG      BARBITURATES Negative NEG      THC (TH-CANNABINOL) Negative NEG      OPIATES Negative NEG      PCP(PHENCYCLIDINE) Negative NEG      COCAINE Negative NEG      AMPHETAMINES Negative NEG      METHADONE Negative NEG      HDSCOM (NOTE)    URINE MICROSCOPIC ONLY    Collection Time: 11/27/22  5:50 PM   Result Value Ref Range    WBC 4 to 10 0 - 5 /hpf    RBC 0 to 3 0 - 5 /hpf    Epithelial cells FEW 0 - 5 /lpf    Bacteria FEW (A) NEG /hpf    Mucus FEW (A) NEG /lpf   TROPONIN-HIGH SENSITIVITY    Collection Time: 11/27/22  5:55 PM   Result Value Ref Range    Troponin-High Sensitivity 71 0 - 78 ng/L       Radiologic Studies -   XR CHEST PORT   Final Result         1. No significant change of pulmonary edema.       CTA CHEST W OR W WO CONT    (Results Pending)     CT Results  (Last 48 hours)      None          CXR Results  (Last 48 hours)                 11/27/22 1531  XR CHEST PORT Final result    Impression:          1. No significant change of pulmonary edema. Narrative:  EXAM: CHEST RADIOGRAPH       CLINICAL INDICATION/HISTORY: chest pain     > Additional: None       COMPARISON: 10/16/2022. TECHNIQUE: Portable frontal view of the chest       _______________       FINDINGS:       SUPPORT DEVICES: None. HEART AND MEDIASTINUM: Stable cardiac size. Remaining mediastinal contours   within normal limits. LUNGS AND PLEURAL SPACES: Interstitial markings are prominent but stable. No   evidence for pneumothorax or pleural effusion.        BONY THORAX AND SOFT TISSUES: Unremarkable.       _______________                   Medications given in the ED-  Medications   iopamidoL (ISOVUE-370) 76 % injection 1-75 mL (has no administration in time range)   furosemide (LASIX) injection 40 mg (has no administration in time range)   sodium chloride (NS) flush 5-40 mL (has no administration in time range)   sodium chloride (NS) flush 5-40 mL (has no administration in time range)   acetaminophen (TYLENOL) tablet 650 mg (has no administration in time range)     Or   acetaminophen (TYLENOL) suppository 650 mg (has no administration in time range)   polyethylene glycol (MIRALAX) packet 17 g (has no administration in time range)   ondansetron (ZOFRAN ODT) tablet 4 mg (has no administration in time range)     Or   ondansetron (ZOFRAN) injection 4 mg (has no administration in time range)   enoxaparin (LOVENOX) injection 40 mg (has no administration in time range)   potassium chloride SR (K-TAB) tablet 40 mEq (has no administration in time range)     Or   potassium bicarb-citric acid (EFFER-K) tablet 40 mEq (has no administration in time range)     Or   potassium chloride 10 mEq in 100 ml IVPB (has no administration in time range)   magnesium sulfate 2 g/50 ml IVPB (premix or compounded) (has no administration in time range)   insulin lispro (HUMALOG) injection (has no administration in time range)   glucose chewable tablet 16 g (has no administration in time range)   glucagon (GLUCAGEN) injection 1 mg (has no administration in time range)   dextrose 10% infusion 0-250 mL (has no administration in time range)   naloxone [facility]) injection 0.2 mg (0.2 mg IntraVENous Given 11/27/22 1611)   furosemide (LASIX) injection 40 mg (40 mg IntraVENous Given 11/27/22 1657)       Please note that this dictation was completed with [employer], the computer voice recognition software. Quite often unanticipated grammatical, syntax, homophones, and other interpretive errors are inadvertently transcribed by the computer software. Please disregard these errors. Please excuse any errors that have escaped final proofreading.

## 2022-11-27 NOTE — Clinical Note
Contrast Dose Calculator:   Patient's age: 45.   Patient's sex: Male. Patient weight (kg) = 91.6. Creatinine level (mg/dL) = 0.86. Creatinine clearance (mL/min): 127.22. Max Contrast dose per Creatinine Cl calculator = 286.25 mL.

## 2022-11-27 NOTE — Clinical Note
TRANSFER - OUT REPORT:     Verbal report given to: Beulah Hernandez RN. Report consisted of patient's Situation, Background, Assessment and   Recommendations(SBAR). Opportunity for questions and clarification was provided. Patient transported with a Registered Nurse and [address] [place] [address] / [facility].

## 2022-11-27 NOTE — Clinical Note
Status[de-identified] INPATIENT [101]   Type of Bed: Telemetry [19]   Cardiac Monitoring Required?: Yes   Inpatient Hospitalization Certified Necessary for the Following Reasons: 3.  Patient receiving treatment that can only be provided in an inpatient setting (further clarification in H&P documentation)   Admitting Diagnosis: CHF (congestive heart failure) [facility]) [[id number]]   Admitting Physician: Alireza Ireland [[id number]]   Attending Physician: Alireza Ireland [[id number]]   Estimated Length of Stay: 2 Midnights   Discharge Plan[de-identified] Home with Office Follow-up

## 2022-11-28 ENCOUNTER — APPOINTMENT (OUTPATIENT)
Dept: NON INVASIVE DIAGNOSTICS | Age: 52
DRG: 286 | End: 2022-11-28
Attending: INTERNAL MEDICINE

## 2022-11-28 PROBLEM — E11.9 DM (DIABETES MELLITUS) (HCC): Status: ACTIVE | Noted: 2022-11-28

## 2022-11-28 PROBLEM — J18.9 PNEUMONIA: Status: ACTIVE | Noted: 2022-11-28

## 2022-11-28 LAB
ALBUMIN SERPL-MCNC: 3.3 G/DL (ref 3.4–5)
ALBUMIN/GLOB SERPL: 0.9 {RATIO} (ref 0.8–1.7)
ALP SERPL-CCNC: 113 U/L (ref 45–117)
ALT SERPL-CCNC: 43 U/L (ref 16–61)
ANION GAP SERPL CALC-SCNC: 7 MMOL/L (ref 3–18)
AST SERPL-CCNC: 45 U/L (ref 10–38)
BASOPHILS # BLD: 0 K/UL (ref 0–0.1)
BASOPHILS NFR BLD: 1 % (ref 0–2)
BILIRUB SERPL-MCNC: 0.8 MG/DL (ref 0.2–1)
BUN SERPL-MCNC: 11 MG/DL (ref 7–18)
BUN/CREAT SERPL: 14 (ref 12–20)
CALCIUM SERPL-MCNC: 10.4 MG/DL (ref 8.5–10.1)
CHLORIDE SERPL-SCNC: 106 MMOL/L (ref 100–111)
CO2 SERPL-SCNC: 25 MMOL/L (ref 21–32)
CREAT SERPL-MCNC: 0.8 MG/DL (ref 0.6–1.3)
DIFFERENTIAL METHOD BLD: NORMAL
ECHO AO ROOT DIAM: 3 CM
ECHO AO ROOT INDEX: 1.36 CM/M2
ECHO AV AREA PEAK VELOCITY: 4.7 CM2
ECHO AV AREA VTI: 4.7 CM2
ECHO AV AREA/BSA PEAK VELOCITY: 2.1 CM2/M2
ECHO AV AREA/BSA VTI: 2.1 CM2/M2
ECHO AV MEAN GRADIENT: 6 MMHG
ECHO AV MEAN VELOCITY: 1.2 M/S
ECHO AV PEAK GRADIENT: 8 MMHG
ECHO AV PEAK VELOCITY: 1.4 M/S
ECHO AV VELOCITY RATIO: 0.71
ECHO AV VTI: 26.7 CM
ECHO LA DIAMETER INDEX: 1.82 CM/M2
ECHO LA DIAMETER: 4 CM
ECHO LA TO AORTIC ROOT RATIO: 1.33
ECHO LA VOL 2C: 57 ML (ref 18–58)
ECHO LA VOL 4C: 75 ML (ref 18–58)
ECHO LA VOL BP: 68 ML (ref 18–58)
ECHO LA VOL/BSA BIPLANE: 31 ML/M2 (ref 16–34)
ECHO LA VOLUME AREA LENGTH: 74 ML
ECHO LA VOLUME INDEX A2C: 26 ML/M2 (ref 16–34)
ECHO LA VOLUME INDEX A4C: 34 ML/M2 (ref 16–34)
ECHO LA VOLUME INDEX AREA LENGTH: 34 ML/M2 (ref 16–34)
ECHO LV E' LATERAL VELOCITY: 6 CM/S
ECHO LV E' SEPTAL VELOCITY: 5 CM/S
ECHO LV EDV A2C: 151 ML
ECHO LV EDV A4C: 243 ML
ECHO LV EDV BP: 205 ML (ref 67–155)
ECHO LV EDV INDEX A4C: 110 ML/M2
ECHO LV EDV INDEX BP: 93 ML/M2
ECHO LV EDV NDEX A2C: 69 ML/M2
ECHO LV EJECTION FRACTION A2C: 12 %
ECHO LV EJECTION FRACTION A4C: 36 %
ECHO LV EJECTION FRACTION BIPLANE: 28 % (ref 55–100)
ECHO LV ESV A2C: 133 ML
ECHO LV ESV A4C: 157 ML
ECHO LV ESV BP: 148 ML (ref 22–58)
ECHO LV ESV INDEX A2C: 60 ML/M2
ECHO LV ESV INDEX A4C: 71 ML/M2
ECHO LV ESV INDEX BP: 67 ML/M2
ECHO LV FRACTIONAL SHORTENING: 10 % (ref 28–44)
ECHO LV INTERNAL DIMENSION DIASTOLE INDEX: 3.27 CM/M2
ECHO LV INTERNAL DIMENSION DIASTOLIC: 7.2 CM (ref 4.2–5.9)
ECHO LV INTERNAL DIMENSION SYSTOLIC INDEX: 2.95 CM/M2
ECHO LV INTERNAL DIMENSION SYSTOLIC: 6.5 CM
ECHO LV IVSD: 0.9 CM (ref 0.6–1)
ECHO LV MASS 2D: 276.6 G (ref 88–224)
ECHO LV MASS INDEX 2D: 125.7 G/M2 (ref 49–115)
ECHO LV POSTERIOR WALL DIASTOLIC: 0.8 CM (ref 0.6–1)
ECHO LV RELATIVE WALL THICKNESS RATIO: 0.22
ECHO LVOT AREA: 7.1 CM2
ECHO LVOT AV VTI INDEX: 0.68
ECHO LVOT DIAM: 3 CM
ECHO LVOT MEAN GRADIENT: 2 MMHG
ECHO LVOT PEAK GRADIENT: 4 MMHG
ECHO LVOT PEAK VELOCITY: 1 M/S
ECHO LVOT STROKE VOLUME INDEX: 58.1 ML/M2
ECHO LVOT SV: 127.9 ML
ECHO LVOT VTI: 18.1 CM
ECHO MV A VELOCITY: 0.32 M/S
ECHO MV E DECELERATION TIME (DT): 103.2 MS
ECHO MV E VELOCITY: 0.78 M/S
ECHO MV E/A RATIO: 2.44
ECHO MV E/E' LATERAL: 13
ECHO MV E/E' RATIO (AVERAGED): 14.3
ECHO MV E/E' SEPTAL: 15.6
ECHO PV ACCELERATION TIME (AT): 54.2 MS
ECHO PV MAX VELOCITY: 0.9 M/S
ECHO PV PEAK GRADIENT: 3 MMHG
ECHO RV INTERNAL DIMENSION: 3.7 CM
EOSINOPHIL # BLD: 0.3 K/UL (ref 0–0.4)
EOSINOPHIL NFR BLD: 5 % (ref 0–5)
ERYTHROCYTE [DISTWIDTH] IN BLOOD BY AUTOMATED COUNT: 13.7 % (ref 11.6–14.5)
EST. AVERAGE GLUCOSE BLD GHB EST-MCNC: 126 MG/DL
GLOBULIN SER CALC-MCNC: 3.7 G/DL (ref 2–4)
GLUCOSE BLD STRIP.AUTO-MCNC: 114 MG/DL (ref 70–110)
GLUCOSE BLD STRIP.AUTO-MCNC: 121 MG/DL (ref 70–110)
GLUCOSE BLD STRIP.AUTO-MCNC: 128 MG/DL (ref 70–110)
GLUCOSE BLD STRIP.AUTO-MCNC: 99 MG/DL (ref 70–110)
GLUCOSE SERPL-MCNC: 138 MG/DL (ref 74–99)
HBA1C MFR BLD: 6 % (ref 4.2–5.6)
HCT VFR BLD AUTO: 44.4 % (ref 36–48)
HGB BLD-MCNC: 14.6 G/DL (ref 13–16)
IMM GRANULOCYTES # BLD AUTO: 0 K/UL (ref 0–0.04)
IMM GRANULOCYTES NFR BLD AUTO: 0 % (ref 0–0.5)
LYMPHOCYTES # BLD: 1.7 K/UL (ref 0.9–3.6)
LYMPHOCYTES NFR BLD: 28 % (ref 21–52)
MAGNESIUM SERPL-MCNC: 2.2 MG/DL (ref 1.6–2.6)
MCH RBC QN AUTO: 28.5 PG (ref 24–34)
MCHC RBC AUTO-ENTMCNC: 32.9 G/DL (ref 31–37)
MCV RBC AUTO: 86.5 FL (ref 78–100)
MONOCYTES # BLD: 0.5 K/UL (ref 0.05–1.2)
MONOCYTES NFR BLD: 9 % (ref 3–10)
NEUTS SEG # BLD: 3.5 K/UL (ref 1.8–8)
NEUTS SEG NFR BLD: 58 % (ref 40–73)
NRBC # BLD: 0 K/UL (ref 0–0.01)
NRBC BLD-RTO: 0 PER 100 WBC
PLATELET # BLD AUTO: 269 K/UL (ref 135–420)
PMV BLD AUTO: 11.6 FL (ref 9.2–11.8)
POTASSIUM SERPL-SCNC: 3.8 MMOL/L (ref 3.5–5.5)
PROT SERPL-MCNC: 7 G/DL (ref 6.4–8.2)
RBC # BLD AUTO: 5.13 M/UL (ref 4.35–5.65)
SODIUM SERPL-SCNC: 138 MMOL/L (ref 136–145)
TROPONIN-HIGH SENSITIVITY: 70 NG/L (ref 0–78)
WBC # BLD AUTO: 6.1 K/UL (ref 4.6–13.2)

## 2022-11-28 PROCEDURE — 74011250637 HC RX REV CODE- 250/637: Performed by: INTERNAL MEDICINE

## 2022-11-28 PROCEDURE — 80053 COMPREHEN METABOLIC PANEL: CPT

## 2022-11-28 PROCEDURE — 74011000258 HC RX REV CODE- 258: Performed by: INTERNAL MEDICINE

## 2022-11-28 PROCEDURE — 36415 COLL VENOUS BLD VENIPUNCTURE: CPT

## 2022-11-28 PROCEDURE — 83735 ASSAY OF MAGNESIUM: CPT

## 2022-11-28 PROCEDURE — 74011250636 HC RX REV CODE- 250/636: Performed by: INTERNAL MEDICINE

## 2022-11-28 PROCEDURE — 82962 GLUCOSE BLOOD TEST: CPT

## 2022-11-28 PROCEDURE — 93306 TTE W/DOPPLER COMPLETE: CPT

## 2022-11-28 PROCEDURE — 87040 BLOOD CULTURE FOR BACTERIA: CPT

## 2022-11-28 PROCEDURE — 94640 AIRWAY INHALATION TREATMENT: CPT

## 2022-11-28 PROCEDURE — 74011000250 HC RX REV CODE- 250: Performed by: INTERNAL MEDICINE

## 2022-11-28 PROCEDURE — 84484 ASSAY OF TROPONIN QUANT: CPT

## 2022-11-28 PROCEDURE — 65270000046 HC RM TELEMETRY

## 2022-11-28 PROCEDURE — 85025 COMPLETE CBC W/AUTO DIFF WBC: CPT

## 2022-11-28 RX ORDER — IPRATROPIUM BROMIDE AND ALBUTEROL SULFATE 2.5; .5 MG/3ML; MG/3ML
3 SOLUTION RESPIRATORY (INHALATION)
Status: DISCONTINUED | OUTPATIENT
Start: 2022-11-28 | End: 2022-11-28

## 2022-11-28 RX ORDER — IPRATROPIUM BROMIDE AND ALBUTEROL SULFATE 2.5; .5 MG/3ML; MG/3ML
3 SOLUTION RESPIRATORY (INHALATION)
Status: DISCONTINUED | OUTPATIENT
Start: 2022-11-29 | End: 2022-11-30

## 2022-11-28 RX ORDER — CARVEDILOL 3.12 MG/1
3.12 TABLET ORAL 2 TIMES DAILY WITH MEALS
Status: DISCONTINUED | OUTPATIENT
Start: 2022-11-28 | End: 2022-11-29

## 2022-11-28 RX ADMIN — IPRATROPIUM BROMIDE AND ALBUTEROL SULFATE 3 ML: .5; 3 SOLUTION RESPIRATORY (INHALATION) at 07:58

## 2022-11-28 RX ADMIN — SODIUM CHLORIDE, PRESERVATIVE FREE 10 ML: 5 INJECTION INTRAVENOUS at 14:13

## 2022-11-28 RX ADMIN — FUROSEMIDE 40 MG: 10 INJECTION, SOLUTION INTRAMUSCULAR; INTRAVENOUS at 09:07

## 2022-11-28 RX ADMIN — SODIUM CHLORIDE, PRESERVATIVE FREE 10 ML: 5 INJECTION INTRAVENOUS at 22:05

## 2022-11-28 RX ADMIN — FUROSEMIDE 40 MG: 10 INJECTION, SOLUTION INTRAMUSCULAR; INTRAVENOUS at 20:19

## 2022-11-28 RX ADMIN — CARVEDILOL 3.12 MG: 3.12 TABLET, FILM COATED ORAL at 18:37

## 2022-11-28 RX ADMIN — IPRATROPIUM BROMIDE AND ALBUTEROL SULFATE 3 ML: .5; 3 SOLUTION RESPIRATORY (INHALATION) at 15:41

## 2022-11-28 RX ADMIN — AZITHROMYCIN MONOHYDRATE 500 MG: 500 INJECTION, POWDER, LYOPHILIZED, FOR SOLUTION INTRAVENOUS at 03:15

## 2022-11-28 RX ADMIN — ENOXAPARIN SODIUM 40 MG: 100 INJECTION SUBCUTANEOUS at 09:07

## 2022-11-28 RX ADMIN — IPRATROPIUM BROMIDE AND ALBUTEROL SULFATE 3 ML: .5; 3 SOLUTION RESPIRATORY (INHALATION) at 19:43

## 2022-11-28 RX ADMIN — CEFTRIAXONE 1 G: 1 INJECTION, POWDER, FOR SOLUTION INTRAMUSCULAR; INTRAVENOUS at 03:06

## 2022-11-28 RX ADMIN — SODIUM CHLORIDE, PRESERVATIVE FREE 10 ML: 5 INJECTION INTRAVENOUS at 06:37

## 2022-11-28 NOTE — ROUTINE PROCESS
Bedside and Verbal shift change report given to Michael Love RN (oncoming nurse) by Jen Dhaliwal RN (offgoing nurse). Report included the following information SBAR, Kardex, ED Summary, Intake/Output, MAR, and Cardiac Rhythm NSR to Sinus Tachy .

## 2022-11-28 NOTE — H&P
History & Physical    Patient: Ruben Bello MRN: [id number]  CSN: [id number]    YOB: 1970  Age: 45 y.o. Sex: male      DOA: 11/27/2022    Chief Complaint:   Chief Complaint   Patient presents with    Chest Pain          HPI:     Ruben Bello is a 45 y.o.  male who has history of diabetes, hypertension, presents to the emergency room with complaints of chest pain and increasing dyspnea with minimal exertion over the last week. He denies lower extremity edema or abdominal distention but does have an associated cough with congestion. He has history of fluid in his lungs in the past had not seen a cardiologist as was instructed. He did have some chest pain that was intermittent over the last 3 days and worsening fatigue. In the emergency room he had some hypoxemia with sats of 85% this improved with IV Lasix and oxygen he diuresed with improvement of hypoxemia initial chest x-ray showed fluid overload CT of the chest shows pneumonia there is no evidence of PE ABG shows hypoxemia Emerphed admitted for tachypnea hypoxemia new CHF and pneumonia  Past Medical History:   Diagnosis Date    Diabetes ([address]        History reviewed. No pertinent surgical history. History reviewed. No pertinent family history. Social History     Socioeconomic History    Marital status: SINGLE   Tobacco Use    Smoking status: Every Day     Packs/day: 1.00     Types: Cigarettes    Smokeless tobacco: Never   Substance and Sexual Activity    Alcohol use: Not Currently    Drug use: Not Currently       Prior to Admission medications    Medication Sig Start Date End Date Taking? Authorizing Provider   methocarbamoL (Robaxin-750) 750 mg tablet Take 1 Tablet by mouth every six to eight (6-8) hours as needed for Muscle Spasm(s). 10/30/22   FEDERICO Cosby   metFORMIN (GLUCOPHAGE) 500 mg tablet Take 1 Tablet by mouth two (2) times daily (with meals).  10/16/22   Radha Finn MD       No Known Allergies      Review of Systems  GENERAL: Patient alert, awake and oriented times 3, able to communicate full sentences and not in distress. HEENT: No change in vision, no earache, tinnitus, sore throat or sinus congestion. NECK: No pain or stiffness. PULMONARY+shortness of breath,+ cough or wheeze. Cardiovascular: no pnd or orthopnea,+P  GASTROINTESTINAL: No abdominal pain, nausea, vomiting or diarrhea, melena or bright red blood per rectum. GENITOURINARY: No urinary frequency, urgency, hesitancy or dysuria. MUSCULOSKELETAL: No joint or muscle pain, no back pain, no recent trauma. DERMATOLOGIC: No rash, no itching, no lesions. ENDOCRINE: No polyuria, polydipsia, no heat or cold intolerance. No recent change in weight. HEMATOLOGICAL: No anemia or easy bruising or bleeding. NEUROLOGIC: No headache, seizures, numbness, tingling or weakness. Physical Exam:     Physical Exam:  Visit Vitals  /86 (BP 1 Location: Right upper arm, BP Patient Position: At rest)   Pulse 96   Temp 98.3 °F (36.8 °C)   Resp 18   Ht 6' (1.829 m)   Wt 97.5 kg (215 lb)   SpO2 91%   BMI 29.16 kg/m²    O2 Flow Rate (L/min): 2 l/min O2 Device: None (Room air)    Temp (24hrs), Av.8 °F (36.6 °C), Min:97 °F (36.1 °C), Max:98.3 °F (36.8 °C)    No intake/output data recorded.  0701 -  1900  In: -   Out: 800 [Urine:800]    General:  Alert, cooperative, no distress, appears stated age. Head: Normocephalic, without obvious abnormality, atraumatic. Eyes:  Conjunctivae/corneas clear. PERRL, EOMs intact. Nose: Nares normal. No drainage or sinus tenderness. Neck: Supple, symmetrical, trachea midline, no adenopathy, thyroid: no enlargement, no carotid bruit and no JVD. Lungs:   Clear to auscultation bilaterally. Heart:  Regular rate and rhythm, S1, S2 normal.     Abdomen: Soft, non-tender. Bowel sounds normal.    Extremities: Extremities normal, atraumatic, no cyanosis or edema.    Pulses: 2+ and symmetric all extremities. Skin:  No rashes or lesions   Neurologic: AAOx3, No focal motor or sensory deficit. Labs Reviewed: All lab results for the last 24 hours reviewed.   ST elevations with LVH discussed with cardiology no acute ST indication for code STEMI and EKG    Procedures/imaging: see electronic medical records for all procedures/Xrays and details which were not copied into this note but were reviewed prior to creation of Plan      Assessment/Plan     Principal Problem:    CHF (congestive heart failure) ([facility] (11/27/2022)  Check echocardiogram  Trend troponin  Cardiology is consulted  Fluid restrict  Daily weights with strict I's and O's  Active Problems:      Pneumonia (11/28/2022)  Started on Rocephin and Zithromax  Blood cultures obtained  Sputum cultures obtained  Initiate oxygen      DM (diabetes mellitus) ([facility] (11/28/2022)  Hold metformin  Diabetic diet  Mealtime insulin with long-acting insulin  Sliding scale insulin  Plan: DVT/GI Prophylaxis: Hep SQ      Tobacco disorder  Advised to quit  Nicotine patch  Ludie Goodell, MD  11/28/2022 7:37 PM

## 2022-11-28 NOTE — CONSULTS
[facility] Consult Note      Patient: Rayne Batres MRN: [id number]  SSN: [id number]    YOB: 1970  Age: 45 y.o. Sex: male    Date of Consultation: 11/28/2022    Reason for Consultation: SOB and CP    HPI:  I was asked by Georges CABALLERO to see this patient for chest pain and shortness of breath. Rayne Batres is a 61-year-old nice gentleman with a history of diabetes, hypertension, possible CHF, smoking came to the hospital and with symptoms of chest pain and shortness of breath diagnosed with CHF. Patient have difficulty in getting prescription filled due to lack of insurance. Can have remote history of cocaine. Denied any recent drug abuse. Patient denied any known history of sleep apnea. No history of orthopnea PND ankle swelling. No history of DVT pulmonary embolism  No known history of CAD             Past Medical History:   Diagnosis Date    Diabetes ([facility] [address]      History reviewed. No pertinent surgical history.   Current Facility-Administered Medications   Medication Dose Route Frequency    cefTRIAXone (ROCEPHIN) 1 g in 0.9% sodium chloride (MBP/ADV) 50 mL MBP  1 g IntraVENous Q24H    azithromycin (ZITHROMAX) 500 mg in 0.9% sodium chloride 250 mL (VIAL-MATE)  500 mg IntraVENous Q24H    albuterol-ipratropium (DUO-NEB) 2.5 MG-0.5 MG/3 ML  3 mL Nebulization Q4H RT    carvediloL (COREG) tablet 3.125 mg  3.125 mg Oral BID WITH MEALS    furosemide (LASIX) injection 40 mg  40 mg IntraVENous Q12H    sodium chloride (NS) flush 5-40 mL  5-40 mL IntraVENous Q8H    sodium chloride (NS) flush 5-40 mL  5-40 mL IntraVENous PRN    acetaminophen (TYLENOL) tablet 650 mg  650 mg Oral Q6H PRN    Or    acetaminophen (TYLENOL) suppository 650 mg  650 mg Rectal Q6H PRN    polyethylene glycol (MIRALAX) packet 17 g  17 g Oral DAILY PRN    ondansetron (ZOFRAN ODT) tablet 4 mg  4 mg Oral Q8H PRN    Or    ondansetron (ZOFRAN) injection 4 mg  4 mg IntraVENous Q6H PRN    enoxaparin (LOVENOX) injection 40 mg  40 mg SubCUTAneous DAILY    potassium chloride SR (K-TAB) tablet 40 mEq  40 mEq Oral PRN    Or    potassium bicarb-citric acid (EFFER-K) tablet 40 mEq  40 mEq Oral PRN    Or    potassium chloride 10 mEq in 100 ml IVPB  10 mEq IntraVENous PRN    magnesium sulfate 2 g/50 ml IVPB (premix or compounded)  2 g IntraVENous PRN    insulin lispro (HUMALOG) injection   SubCUTAneous AC&HS    glucose chewable tablet 16 g  4 Tablet Oral PRN    glucagon (GLUCAGEN) injection 1 mg  1 mg IntraMUSCular PRN    dextrose 10% infusion 0-250 mL  0-250 mL IntraVENous PRN       Allergies and Intolerances:   No Known Allergies    Family History:   History reviewed. No pertinent family history. Social History:   He  reports that he has been smoking cigarettes. He has been smoking an average of 1 pack per day. He has never used smokeless tobacco.  He  reports that he does not currently use alcohol. Review of Systems:   Gen: No fever, chills, malaise, weight loss/gain. Heent: No headache, rhinorrhea, epistaxis, ear pain, hearing loss, sinus pain, neck pain/stiffness, sore throat. Heart: + chest pain, palpitations, +CONLEY, pnd, or orthopnea. Resp: No cough, hemoptysis, wheezing and +shortness of breath. GI: No nausea, vomiting, diarrhea, constipation, melena or hematochezia. : No urinary obstruction, dysuria or hematuria. Derm: No rash, new skin lesion or pruritis. Musc/skeletal: no bone or joint complains. Vasc: No edema, cyanosis or claudication. Endo: No heat/cold intolerance, no polyuria,polydipsia or polyphagia. Neuro: No unilateral weakness, numbness, tingling. No seizures. Heme: No easy bruising or bleeding. Physical:   Patient Vitals for the past 6 hrs:   Temp Pulse Resp BP SpO2   11/28/22 1637 98.4 °F (36.9 °C) 70 16 103/76 100 %   11/28/22 1541 -- -- -- -- 100 %   11/28/22 1145 99.2 °F (37.3 °C) 94 16 100/66 98 %         Exam:   General Appearance: Comfortable, not using accessory muscles of respiration. HEENT: LEVAR. HEAD: Atraumatic  NECK: No JVD, no thyroidomeglay. LUNGS: Clear bilaterally. HEART: S1+S2     ABD: Non-tender, BS Audible    EXT: No edema, and no cysnosis. VASCULAR EXAM: Pulses are intact. PSYCHIATRIC EXAM: Mood is appropriate. MUSCULOSKELETAL: Grossly no joint deformity. NEUROLOGICAL: Motor and sensory sytem intact and Cranial nerves II-XII intact. Review of Data:   LABS:   Lab Results   Component Value Date/Time    WBC 6.1 11/28/2022 02:55 AM    HGB 14.6 11/28/2022 02:55 AM    HCT 44.4 11/28/2022 02:55 AM    PLATELET 017 89/11/7598 02:55 AM     Lab Results   Component Value Date/Time    Sodium 138 11/28/2022 02:55 AM    Potassium 3.8 11/28/2022 02:55 AM    Chloride 106 11/28/2022 02:55 AM    CO2 25 11/28/2022 02:55 AM    Glucose 138 (H) 11/28/2022 02:55 AM    BUN 11 11/28/2022 02:55 AM    Creatinine 0.80 11/28/2022 02:55 AM     No results found for: CHOL, CHOLX, CHLST, CHOLV, HDL, HDLP, LDL, LDLC, DLDLP, TGLX, TRIGL, TRIGP  No components found for: GPT  Lab Results   Component Value Date/Time    Hemoglobin A1c 6.0 (H) 11/27/2022 03:12 PM       RADIOLOGY:  CT Results  (Last 48 hours)                 11/27/22 2013  CTA CHEST W OR W WO CONT Final result    Impression:      1. No evidence of pulmonary embolism. 2.  Bilateral pneumonia. 3. Hilar enlarged lymph nodes, likely reactive. Narrative:  EXAM: CTA chest       INDICATION: Pain. COMPARISON: None       TECHNIQUE: Axial CT imaging from the thoracic inlet through the diaphragm with   intravenous contrast. Coronal and sagittal MIP reformats were generated. One or more dose reduction techniques were used on this CT: automated exposure   control, adjustment of the mAs and/or kVp according to patient size, and   iterative reconstruction techniques. The specific techniques used on this CT   exam have been documented in the patient's electronic medical record.   Digital   Imaging and Communications in Medicine (DICOM) format image data are available   to nonaffiliated external healthcare facilities or entities on a secure, media   free, reciprocally searchable basis with patient authorization for at least a   12-month period after this study. _______________       FINDINGS:       EXAM QUALITY: Adequate. PULMONARY ARTERIES: No evidence of pulmonary embolism. MEDIASTINUM: Normal heart size. No evidence of right heart strain. Aorta is   unremarkable. No pericardial effusion. LUNGS: No suspicious nodule or mass. Diffuse patchy densities primarily in the   upper lobes. Right basilar atelectasis. PLEURA: Normal.       AIRWAY: Normal.       LYMPH NODES: Bilateral hilar enlarged lymph nodes with the right side measuring   2.3 x 1.5 cm. UPPER ABDOMEN: Left renal cyst, incompletely imaged. .       OTHER: No acute or aggressive osseous abnormalities identified. _______________                 CXR Results  (Last 48 hours)                 11/27/22 1531  XR CHEST PORT Final result    Impression:          1. No significant change of pulmonary edema. Narrative:  EXAM: CHEST RADIOGRAPH       CLINICAL INDICATION/HISTORY: chest pain     > Additional: None       COMPARISON: 10/16/2022. TECHNIQUE: Portable frontal view of the chest       _______________       FINDINGS:       SUPPORT DEVICES: None. HEART AND MEDIASTINUM: Stable cardiac size. Remaining mediastinal contours   within normal limits. LUNGS AND PLEURAL SPACES: Interstitial markings are prominent but stable. No   evidence for pneumothorax or pleural effusion.        BONY THORAX AND SOFT TISSUES: Unremarkable.       _______________                     Cardiology Procedures:   Results for orders placed or performed during the hospital encounter of 11/27/22   EKG, 12 LEAD, INITIAL   Result Value Ref Range    Ventricular Rate 100 BPM    Atrial Rate 100 BPM    P-R Interval 136 ms    QRS Duration 100 ms    Q-T Interval 360 ms    QTC Calculation (Bezet) 464 ms    Calculated P Axis 71 degrees    Calculated R Axis 46 degrees    Calculated T Axis 60 degrees    Diagnosis       Normal sinus rhythm  Possible Left atrial enlargement  Left ventricular hypertrophy ( Sokolow-Bob , Yang product )  Nonspecific T wave abnormality  Prolonged QT  Abnormal ECG  When compared with ECG of 16-OCT-2022 19:56,  premature ventricular complexes are no longer present    Confirmed by Flex Seals MD. ([id number]) on 11/27/2022 11:12:59 PM        Echo Results  (Last 48 hours)      None         Cardiolite (Tc-99m Sestamibi) stress test        Impression / Plan:    Patient Active Problem List   Diagnosis Code    CHF (congestive heart failure) ([facility]) I50.9    Pneumonia J18.9    DM (diabetes mellitus) ([facility] [address] E11.9     Result status: Final result       Left Ventricle: Reduced left ventricular systolic function with a visually estimated EF of 30 - 35%. Left ventricle is severely dilated. Normal wall thickness. Severe global hypokinesis present. Grade II diastolic dysfunction with increased LAP. Aortic Valve: Tricuspid valve. Mitral Valve: Mildly thickened leaflet, at the anterior leaflet. Mild regurgitation. Left Atrium: Left atrium is mildly dilated. A/P  Acute combined systolic and diastolic heart failure  Cardiomyopathy  Pneumonia  Diabetes mellitus    Plan  Continue Lasix  Patient blood pressure is low normal I will start carvedilol  I will consider losartan/lisinopril/Aldactone if blood pressure is permissive  Patient have risk factor for CAD in the form of smoking diabetes. Discussed with patient will need ischemic testing with right and left heart catheterization versus stress test.  At this point because of risk factor for CAD will consider cardiac catheterization on Wednesday after adequate antibiotic treatment for pneumonia. Thank you for allowing me to participate in management of pleasant patient.   Please feel free to contact me if you have any questions or concerns.     Signed By: John Paul Fleming MD     November 28, 2022

## 2022-11-28 NOTE — PROGRESS NOTES
Reason for Admission:  Chest pain                     RUR Score:  Low; 9%                   Plan for utilizing home health:  none vs H2H         PCP: First and Last name:  None     Name of Practice:    Are you a current patient: Yes/No:    Approximate date of last visit:    Can you participate in a virtual visit with your PCP:                     Current Advanced Directive/Advance Care Plan: Full Code      Healthcare Decision Maker:   Click here to complete [address] including selection of the Healthcare Decision Maker Relationship (ie \"Primary\")                             Transition of Care Plan:  Home with physician follow up                     Chart reviewed. Per H&P Aruna South is a 45 y.o.  male who has history of diabetes, hypertension, presents to the emergency room with complaints of chest pain and increasing dyspnea with minimal exertion over the last week. He denies lower extremity edema or abdominal distention but does have an associated cough with congestion. He has history of fluid in his lungs in the past had not seen a cardiologist as was instructed. He did have some chest pain that was intermittent over the last 3 days and worsening fatigue. In the emergency room he had some hypoxemia with sats of 85% this improved with IV Lasix and oxygen he diuresed with improvement of hypoxemia initial chest x-ray showed fluid overload CT of the chest shows pneumonia there is no evidence of PE ABG shows hypoxemia Emerphed admitted for tachypnea hypoxemia new CHF and pneumonia. \"    CM and provider met with pt at bedside to discuss care transition. Pt is independent and has family/friends in the area to assist if needed. Pt does not have a PCP in the community and would like assistance with a PCP. CMS has been notified to assist.  Anticipate pt will transition home with in the next 24-48 hours with physician follow up.   Please encourage ambulation as appropriate to assist with care transition. No other care transition needs have been identified at this time. CM to continue to follow and assist as needed. Care Management Interventions  Mode of Transport at Discharge:  Other (see comment) (Family/friend)  Transition of Care Consult (CM Consult): Discharge Planning  Health Maintenance Reviewed: Yes  Support Systems: Other Family Member(s), Friend/Neighbor  Confirm Follow Up Transport: Self  The Plan for Transition of Care is Related to the Following Treatment Goals : Home with physician follow up  Discharge Location  Patient Expects to be Discharged to[de-identified] Home with family assistance

## 2022-11-28 NOTE — PROGRESS NOTES
TRANSFER - IN REPORT:    Verbal report received from Yamilet Borja RN (name) on Colin Grant  being received from ED (unit) for routine progression of care      Report consisted of patients Situation, Background, Assessment and   Recommendations(SBAR). Information from the following report(s) SBAR, Kardex, ED Summary, Intake/Output, MAR, Recent Results, and Cardiac Rhythm NSR   was reviewed with the receiving nurse. Opportunity for questions and clarification was provided. Assessment completed upon patients arrival to unit and care assumed. Placed on telemonitoring box 21; Oriented to room and encouraged to call when needing assistance;  Patient Alert and orientedx4; no complaints of pain as of the moment; Kept monitored;

## 2022-11-28 NOTE — PROGRESS NOTES
Problem: Falls - Risk of  Goal: *Absence of Falls  Description: Document Delfina Abad Fall Risk and appropriate interventions in the flowsheet.   Outcome: Progressing Towards Goal  Note: Fall Risk Interventions:            Medication Interventions: Assess postural VS orthostatic hypotension, Evaluate medications/consider consulting pharmacy, Patient to call before getting OOB, Teach patient to arise slowly                   Problem: Patient Education: Go to Patient Education Activity  Goal: Patient/Family Education  Outcome: Progressing Towards Goal     Problem: General Medical Care Plan  Goal: *Vital signs within specified parameters  Outcome: Progressing Towards Goal  Goal: *Labs within defined limits  Outcome: Progressing Towards Goal  Goal: *Absence of infection signs and symptoms  Outcome: Progressing Towards Goal  Goal: *Optimal pain control at patient's stated goal  Outcome: Progressing Towards Goal  Goal: *Skin integrity maintained  Outcome: Progressing Towards Goal  Goal: *Fluid volume balance  Outcome: Progressing Towards Goal  Goal: *Optimize nutritional status  Outcome: Progressing Towards Goal  Goal: *Anxiety reduced or absent  Outcome: Progressing Towards Goal  Goal: *Progressive mobility and function (eg: ADL's)  Outcome: Progressing Towards Goal     Problem: Patient Education: Go to Patient Education Activity  Goal: Patient/Family Education  Outcome: Progressing Towards Goal     Problem: Pain  Goal: *Control of Pain  Outcome: Progressing Towards Goal     Problem: Patient Education: Go to Patient Education Activity  Goal: Patient/Family Education  Outcome: Progressing Towards Goal     Problem: Tissue Perfusion - Cardiopulmonary, Altered  Goal: *Optimize tissue perfusion  Outcome: Progressing Towards Goal  Goal: *Absence of hypoxia  Outcome: Progressing Towards Goal     Problem: Patient Education: Go to Patient Education Activity  Goal: Patient/Family Education  Outcome: Progressing Towards Goal

## 2022-11-28 NOTE — PROGRESS NOTES
Hospitalist Progress Note    Patient: Gissel Tinajero MRN: [id number]  CSN: [id number]    YOB: 1970  Age: 45 y.o. Sex: male    DOA: 11/27/2022 LOS:  LOS: 1 day                Assessment/Plan     Patient Active Problem List   Diagnosis Code    CHF (congestive heart failure) ([facility] I50.9    Pneumonia J18.9    DM (diabetes mellitus) ([facility] E11.9        Chief complaint :  Chest pain  45 y.o.  male who has history of diabetes, hypertension, presents to the emergency room with complaints of chest pain and increasing dyspnea with minimal exertion over the last week. Pneumonia -  CT chest with no PE  Showed bilateral pneumonia  On Ceftriaxone and azithromycin. Possible CHF -  NT pro BNP elevated. Follow echo  On lasix  Cardiology consulted. DM -  On SSI  ADA diet    DVT prophylaxis with lovenox    Disposition : 1-2 days    Review of systems  General: No fevers or chills. Cardiovascular: No chest pain or pressure. No palpitations. Pulmonary: No shortness of breath. Gastrointestinal: No nausea, vomiting. Physical Exam:  General: Awake, cooperative, no acute distress    HEENT: NC, Atraumatic. PERRLA, anicteric sclerae. Lungs: CTA Bilaterally. No Wheezing/Rhonchi/Rales. Heart:  S1 S2,  No murmur, No Rubs, No Gallops  Abdomen: Soft, Non distended, Non tender. +Bowel sounds,   Extremities: No c/c/e  Psych:   Not anxious or agitated. Neurologic:  No acute neurological deficit.          Vital signs/Intake and Output:  Visit Vitals  /66   Pulse 94   Temp 99.2 °F (37.3 °C)   Resp 16   Ht 6' (1.829 m)   Wt 97.5 kg (215 lb)   SpO2 100%   BMI 29.16 kg/m²     Current Shift:  11/28 0701 - 11/28 1900  In: 120 [P.O.:120]  Out: -   Last three shifts:  11/26 1901 - 11/28 0700  In: -   Out: 800 [Urine:800]            Labs: Results:       Chemistry Recent Labs     11/28/22  0255 11/27/22  1512   * 132*    140   K 3.8 4.0    109   CO2 25 26   BUN 11 11   CREA 0.80 0.83   CA 10.4* 10.4*   AGAP 7 5   BUCR 14 13    117   TP 7.0 7.2   ALB 3.3* 3.5   GLOB 3.7 3.7   AGRAT 0.9 0.9      CBC w/Diff Recent Labs     11/28/22 0255 11/27/22  1512   WBC 6.1 7.6   RBC 5.13 4.85   HGB 14.6 13.9   HCT 44.4 41.6    288   GRANS 58 64   LYMPH 28 22   EOS 5 4      Cardiac Enzymes No results for input(s): CPK, CKND1, KEVIN in the last 72 hours. No lab exists for component: CKRMB, TROIP   Coagulation No results for input(s): PTP, INR, APTT, INREXT in the last 72 hours. Lipid Panel No results found for: CHOL, CHOLPOCT, CHOLX, CHLST, CHOLV, 383731, HDL, HDLP, LDL, LDLC, DLDLP, 439050, VLDLC, VLDL, TGLX, TRIGL, TRIGP, TGLPOCT, CHHD, CHHDX   BNP No results for input(s): BNPP in the last 72 hours.    Liver Enzymes Recent Labs     11/28/22  0255   TP 7.0   ALB 3.3*         Thyroid Studies No results found for: T4, T3U, TSH, TSHEXT     Procedures/imaging: see electronic medical records for all procedures/Xrays and details which were not copied into this note but were reviewed prior to creation of Plan

## 2022-11-29 PROBLEM — I50.23 ACUTE ON CHRONIC SYSTOLIC CONGESTIVE HEART FAILURE (HCC): Status: ACTIVE | Noted: 2022-11-27

## 2022-11-29 PROBLEM — I34.0 NONRHEUMATIC MITRAL VALVE REGURGITATION: Status: ACTIVE | Noted: 2022-11-27

## 2022-11-29 PROBLEM — I50.9 CHF (CONGESTIVE HEART FAILURE) (HCC): Status: ACTIVE | Noted: 2022-11-27

## 2022-11-29 PROBLEM — I42.9 CARDIOMYOPATHY (HCC): Status: ACTIVE | Noted: 2022-11-27

## 2022-11-29 LAB
ALBUMIN SERPL-MCNC: 3.4 G/DL (ref 3.4–5)
ALBUMIN/GLOB SERPL: 0.8 {RATIO} (ref 0.8–1.7)
ALP SERPL-CCNC: 116 U/L (ref 45–117)
ALT SERPL-CCNC: 43 U/L (ref 16–61)
ANION GAP SERPL CALC-SCNC: 8 MMOL/L (ref 3–18)
AST SERPL-CCNC: 39 U/L (ref 10–38)
BACTERIA SPEC CULT: NORMAL
BASOPHILS # BLD: 0 K/UL (ref 0–0.1)
BASOPHILS NFR BLD: 1 % (ref 0–2)
BILIRUB SERPL-MCNC: 0.4 MG/DL (ref 0.2–1)
BUN SERPL-MCNC: 15 MG/DL (ref 7–18)
BUN/CREAT SERPL: 18 (ref 12–20)
CALCIUM SERPL-MCNC: 10.6 MG/DL (ref 8.5–10.1)
CHLORIDE SERPL-SCNC: 107 MMOL/L (ref 100–111)
CO2 SERPL-SCNC: 24 MMOL/L (ref 21–32)
CREAT SERPL-MCNC: 0.83 MG/DL (ref 0.6–1.3)
DIFFERENTIAL METHOD BLD: ABNORMAL
EOSINOPHIL # BLD: 0.3 K/UL (ref 0–0.4)
EOSINOPHIL NFR BLD: 4 % (ref 0–5)
ERYTHROCYTE [DISTWIDTH] IN BLOOD BY AUTOMATED COUNT: 13.6 % (ref 11.6–14.5)
GLOBULIN SER CALC-MCNC: 4.1 G/DL (ref 2–4)
GLUCOSE BLD STRIP.AUTO-MCNC: 101 MG/DL (ref 70–110)
GLUCOSE BLD STRIP.AUTO-MCNC: 123 MG/DL (ref 70–110)
GLUCOSE BLD STRIP.AUTO-MCNC: 140 MG/DL (ref 70–110)
GLUCOSE SERPL-MCNC: 118 MG/DL (ref 74–99)
HCT VFR BLD AUTO: 48.6 % (ref 36–48)
HGB BLD-MCNC: 15.8 G/DL (ref 13–16)
IMM GRANULOCYTES # BLD AUTO: 0 K/UL (ref 0–0.04)
IMM GRANULOCYTES NFR BLD AUTO: 0 % (ref 0–0.5)
LYMPHOCYTES # BLD: 1.8 K/UL (ref 0.9–3.6)
LYMPHOCYTES NFR BLD: 24 % (ref 21–52)
MAGNESIUM SERPL-MCNC: 2.2 MG/DL (ref 1.6–2.6)
MCH RBC QN AUTO: 28 PG (ref 24–34)
MCHC RBC AUTO-ENTMCNC: 32.5 G/DL (ref 31–37)
MCV RBC AUTO: 86.2 FL (ref 78–100)
MONOCYTES # BLD: 0.7 K/UL (ref 0.05–1.2)
MONOCYTES NFR BLD: 10 % (ref 3–10)
NEUTS SEG # BLD: 4.4 K/UL (ref 1.8–8)
NEUTS SEG NFR BLD: 61 % (ref 40–73)
NRBC # BLD: 0 K/UL (ref 0–0.01)
NRBC BLD-RTO: 0 PER 100 WBC
PLATELET # BLD AUTO: 324 K/UL (ref 135–420)
PMV BLD AUTO: 11.2 FL (ref 9.2–11.8)
POTASSIUM SERPL-SCNC: 4 MMOL/L (ref 3.5–5.5)
PROT SERPL-MCNC: 7.5 G/DL (ref 6.4–8.2)
RBC # BLD AUTO: 5.64 M/UL (ref 4.35–5.65)
SERVICE CMNT-IMP: NORMAL
SODIUM SERPL-SCNC: 139 MMOL/L (ref 136–145)
WBC # BLD AUTO: 7.2 K/UL (ref 4.6–13.2)

## 2022-11-29 PROCEDURE — 82962 GLUCOSE BLOOD TEST: CPT

## 2022-11-29 PROCEDURE — 85025 COMPLETE CBC W/AUTO DIFF WBC: CPT

## 2022-11-29 PROCEDURE — 94640 AIRWAY INHALATION TREATMENT: CPT

## 2022-11-29 PROCEDURE — 80053 COMPREHEN METABOLIC PANEL: CPT

## 2022-11-29 PROCEDURE — 83735 ASSAY OF MAGNESIUM: CPT

## 2022-11-29 PROCEDURE — 36415 COLL VENOUS BLD VENIPUNCTURE: CPT

## 2022-11-29 PROCEDURE — 74011000250 HC RX REV CODE- 250: Performed by: INTERNAL MEDICINE

## 2022-11-29 PROCEDURE — 74011250636 HC RX REV CODE- 250/636: Performed by: INTERNAL MEDICINE

## 2022-11-29 PROCEDURE — 65270000046 HC RM TELEMETRY

## 2022-11-29 PROCEDURE — 74011250637 HC RX REV CODE- 250/637: Performed by: INTERNAL MEDICINE

## 2022-11-29 PROCEDURE — 74011000258 HC RX REV CODE- 258: Performed by: INTERNAL MEDICINE

## 2022-11-29 RX ORDER — CARVEDILOL 3.12 MG/1
6.25 TABLET ORAL 2 TIMES DAILY WITH MEALS
Status: DISCONTINUED | OUTPATIENT
Start: 2022-11-30 | End: 2022-12-01 | Stop reason: HOSPADM

## 2022-11-29 RX ADMIN — FUROSEMIDE 40 MG: 10 INJECTION, SOLUTION INTRAMUSCULAR; INTRAVENOUS at 22:41

## 2022-11-29 RX ADMIN — AZITHROMYCIN MONOHYDRATE 500 MG: 500 INJECTION, POWDER, LYOPHILIZED, FOR SOLUTION INTRAVENOUS at 03:22

## 2022-11-29 RX ADMIN — IPRATROPIUM BROMIDE AND ALBUTEROL SULFATE 3 ML: .5; 2.5 SOLUTION RESPIRATORY (INHALATION) at 09:45

## 2022-11-29 RX ADMIN — CEFTRIAXONE 1 G: 1 INJECTION, POWDER, FOR SOLUTION INTRAMUSCULAR; INTRAVENOUS at 02:39

## 2022-11-29 RX ADMIN — ENOXAPARIN SODIUM 40 MG: 100 INJECTION SUBCUTANEOUS at 09:35

## 2022-11-29 RX ADMIN — SODIUM CHLORIDE, PRESERVATIVE FREE 10 ML: 5 INJECTION INTRAVENOUS at 09:39

## 2022-11-29 RX ADMIN — IPRATROPIUM BROMIDE AND ALBUTEROL SULFATE 3 ML: .5; 2.5 SOLUTION RESPIRATORY (INHALATION) at 16:43

## 2022-11-29 RX ADMIN — SODIUM CHLORIDE, PRESERVATIVE FREE 10 ML: 5 INJECTION INTRAVENOUS at 22:42

## 2022-11-29 RX ADMIN — CARVEDILOL 3.12 MG: 3.12 TABLET, FILM COATED ORAL at 09:35

## 2022-11-29 RX ADMIN — CARVEDILOL 3.12 MG: 3.12 TABLET, FILM COATED ORAL at 18:30

## 2022-11-29 RX ADMIN — FUROSEMIDE 40 MG: 10 INJECTION, SOLUTION INTRAMUSCULAR; INTRAVENOUS at 09:35

## 2022-11-29 RX ADMIN — IPRATROPIUM BROMIDE AND ALBUTEROL SULFATE 3 ML: .5; 2.5 SOLUTION RESPIRATORY (INHALATION) at 21:46

## 2022-11-29 NOTE — RT PROTOCOL NOTE
RT Nebulizer Bronchodilator Protocol Note    There is a bronchodilator order in the chart from a provider indicating to follow the RT Bronchodilator Protocol and there is an Initiate RT Bronchodilator Protocol order as well (see protocol at bottom of note). CXR Findings:  Recent Results (from the past 2 days)    XR CHEST PORT 11/27/2022    Impression  1. No significant change of pulmonary edema. The findings from the last RT Protocol Assessment were as follows:  History of Pulmonary Disease: Smoker 15 pack years or more  Respiratory Pattern: Regular pattern and RR 12-20 bpm  Breath Sounds: Clear breath sounds  Cough: Strong, productive  Indication for Bronchodilator Therapy: Decreased or absent breath sounds  Bronchodilator Assessment Score: 2     Aerosolized bronchodilator medication orders have been revised according to the RT Nebulizer Bronchodilator Protocol below. Respiratory Therapist to perform RT Therapy Protocol Assessment initially then follow the protocol. Repeat RT Therapy Protocol Assessment PRN for score 0-3 or on second treatment, BID, and PRN for scores above 3. No Indications - adjust the frequency to every 6 hours PRN wheezing or bronchospasm, if no treatments needed after 48 hours then discontinue using Per Protocol order mode. If indication present, adjust the RT bronchodilator orders based on the Bronchodilator Assessment Score as indicated below. If a patient is on this medication at home then do not decrease Frequency below that used at home. 0-3 - enter or revise RT bronchodilator order(s) to equivalent RT Bronchodilator order with Frequency of every 4 hours PRN for wheezing or increased work of breathing using Per Protocol order mode.         4-6 - enter or revise RT Bronchodilator order(s) to two equivalent RT bronchodilator orders with one order with BID Frequency and one order with Frequency of every 4 hours PRN wheezing or increased work of breathing using Per Protocol order mode. 7-10 - enter or revise RT Bronchodilator order(s) to two equivalent RT bronchodilator orders with one order with TID Frequency and one order with Frequency of every 4 hours PRN wheezing or increased work of breathing using Per Protocol order mode. 11-13 - enter or revise RT Bronchodilator order(s) to one equivalent RT bronchodilator order with QID Frequency and an Albuterol order with Frequency of every 4 hours PRN wheezing or increased work of breathing using Per Protocol order mode. Greater than 13 - enter or revise RT Bronchodilator order(s) to one equivalent RT bronchodilator order with every 4 hours Frequency and an Albuterol order with Frequency of every 2 hours PRN wheezing or increased work of breathing using Per Protocol order mode. RT to enter RT Home Evaluation for COPD & MDI Assessment order using Per Protocol order mode.     Electronically signed by RT Jayna on 11/28/2022 at 7:46 PM

## 2022-11-29 NOTE — ROUTINE PROCESS
Bedside shift change report given to Gama Santiago RN (oncoming nurse) by Louisa Fagan RN (offgoing nurse). Report included the following information SBAR, Procedure Summary, Intake/Output, MAR, and Recent Results.

## 2022-11-29 NOTE — PROGRESS NOTES
Noted tentative plan for cardiac catheterization on Wednesday. Please continue to encourage ambulation as appropriate.  CM to continue to follow and assist.

## 2022-11-29 NOTE — PROGRESS NOTES
Problem: Falls - Risk of  Goal: *Absence of Falls  Description: Document Ani Roach Fall Risk and appropriate interventions in the flowsheet.   Outcome: Progressing Towards Goal  Note: Fall Risk Interventions:            Medication Interventions: Patient to call before getting OOB, Teach patient to arise slowly                   Problem: Patient Education: Go to Patient Education Activity  Goal: Patient/Family Education  Outcome: Progressing Towards Goal     Problem: General Medical Care Plan  Goal: *Vital signs within specified parameters  Outcome: Progressing Towards Goal  Goal: *Labs within defined limits  Outcome: Progressing Towards Goal  Goal: *Absence of infection signs and symptoms  Outcome: Progressing Towards Goal  Goal: *Optimal pain control at patient's stated goal  Outcome: Progressing Towards Goal  Goal: *Skin integrity maintained  Outcome: Progressing Towards Goal  Goal: *Fluid volume balance  Outcome: Progressing Towards Goal  Goal: *Optimize nutritional status  Outcome: Progressing Towards Goal  Goal: *Anxiety reduced or absent  Outcome: Progressing Towards Goal  Goal: *Progressive mobility and function (eg: ADL's)  Outcome: Progressing Towards Goal     Problem: Patient Education: Go to Patient Education Activity  Goal: Patient/Family Education  Outcome: Progressing Towards Goal     Problem: Pain  Goal: *Control of Pain  Outcome: Progressing Towards Goal     Problem: Tissue Perfusion - Cardiopulmonary, Altered  Goal: *Optimize tissue perfusion  Outcome: Progressing Towards Goal  Goal: *Absence of hypoxia  Outcome: Progressing Towards Goal     Problem: Patient Education: Go to Patient Education Activity  Goal: Patient/Family Education  Outcome: Progressing Towards Goal     Problem: Patient Education: Go to Patient Education Activity  Goal: Patient/Family Education  Outcome: Progressing Towards Goal

## 2022-11-30 LAB
ALBUMIN SERPL-MCNC: 3.5 G/DL (ref 3.4–5)
ALBUMIN/GLOB SERPL: 0.9 {RATIO} (ref 0.8–1.7)
ALP SERPL-CCNC: 113 U/L (ref 45–117)
ALT SERPL-CCNC: 44 U/L (ref 16–61)
ANION GAP SERPL CALC-SCNC: 5 MMOL/L (ref 3–18)
AST SERPL-CCNC: 40 U/L (ref 10–38)
BASOPHILS # BLD: 0 K/UL (ref 0–0.1)
BASOPHILS NFR BLD: 1 % (ref 0–2)
BILIRUB SERPL-MCNC: 0.5 MG/DL (ref 0.2–1)
BUN SERPL-MCNC: 17 MG/DL (ref 7–18)
BUN/CREAT SERPL: 20 (ref 12–20)
CALCIUM SERPL-MCNC: 10.7 MG/DL (ref 8.5–10.1)
CHLORIDE SERPL-SCNC: 107 MMOL/L (ref 100–111)
CO2 SERPL-SCNC: 27 MMOL/L (ref 21–32)
CREAT SERPL-MCNC: 0.86 MG/DL (ref 0.6–1.3)
DIFFERENTIAL METHOD BLD: ABNORMAL
EOSINOPHIL # BLD: 0.3 K/UL (ref 0–0.4)
EOSINOPHIL NFR BLD: 4 % (ref 0–5)
ERYTHROCYTE [DISTWIDTH] IN BLOOD BY AUTOMATED COUNT: 13.7 % (ref 11.6–14.5)
GLOBULIN SER CALC-MCNC: 4 G/DL (ref 2–4)
GLUCOSE BLD STRIP.AUTO-MCNC: 109 MG/DL (ref 70–110)
GLUCOSE BLD STRIP.AUTO-MCNC: 119 MG/DL (ref 70–110)
GLUCOSE BLD STRIP.AUTO-MCNC: 136 MG/DL (ref 70–110)
GLUCOSE BLD STRIP.AUTO-MCNC: 139 MG/DL (ref 70–110)
GLUCOSE SERPL-MCNC: 125 MG/DL (ref 74–99)
HCT VFR BLD AUTO: 50 % (ref 36–48)
HGB BLD-MCNC: 16.4 G/DL (ref 13–16)
IMM GRANULOCYTES # BLD AUTO: 0 K/UL (ref 0–0.04)
IMM GRANULOCYTES NFR BLD AUTO: 0 % (ref 0–0.5)
LYMPHOCYTES # BLD: 2 K/UL (ref 0.9–3.6)
LYMPHOCYTES NFR BLD: 29 % (ref 21–52)
MAGNESIUM SERPL-MCNC: 2.2 MG/DL (ref 1.6–2.6)
MCH RBC QN AUTO: 28.1 PG (ref 24–34)
MCHC RBC AUTO-ENTMCNC: 32.8 G/DL (ref 31–37)
MCV RBC AUTO: 85.8 FL (ref 78–100)
MONOCYTES # BLD: 0.7 K/UL (ref 0.05–1.2)
MONOCYTES NFR BLD: 11 % (ref 3–10)
NEUTS SEG # BLD: 3.8 K/UL (ref 1.8–8)
NEUTS SEG NFR BLD: 56 % (ref 40–73)
NRBC # BLD: 0 K/UL (ref 0–0.01)
NRBC BLD-RTO: 0 PER 100 WBC
PLATELET # BLD AUTO: 336 K/UL (ref 135–420)
PMV BLD AUTO: 11.3 FL (ref 9.2–11.8)
POTASSIUM SERPL-SCNC: 4.2 MMOL/L (ref 3.5–5.5)
PROT SERPL-MCNC: 7.5 G/DL (ref 6.4–8.2)
RBC # BLD AUTO: 5.83 M/UL (ref 4.35–5.65)
SODIUM SERPL-SCNC: 139 MMOL/L (ref 136–145)
WBC # BLD AUTO: 6.8 K/UL (ref 4.6–13.2)

## 2022-11-30 PROCEDURE — 80053 COMPREHEN METABOLIC PANEL: CPT

## 2022-11-30 PROCEDURE — C1894 INTRO/SHEATH, NON-LASER: HCPCS | Performed by: INTERNAL MEDICINE

## 2022-11-30 PROCEDURE — 74011250636 HC RX REV CODE- 250/636: Performed by: INTERNAL MEDICINE

## 2022-11-30 PROCEDURE — 65270000046 HC RM TELEMETRY

## 2022-11-30 PROCEDURE — 77030004522 HC CATH ANGI DX EXPO BSC -A: Performed by: INTERNAL MEDICINE

## 2022-11-30 PROCEDURE — 77030013797 HC KT TRNSDUC PRSSR EDWD -A: Performed by: INTERNAL MEDICINE

## 2022-11-30 PROCEDURE — 83735 ASSAY OF MAGNESIUM: CPT

## 2022-11-30 PROCEDURE — 74011000250 HC RX REV CODE- 250: Performed by: INTERNAL MEDICINE

## 2022-11-30 PROCEDURE — 99153 MOD SED SAME PHYS/QHP EA: CPT | Performed by: INTERNAL MEDICINE

## 2022-11-30 PROCEDURE — B2111ZZ FLUOROSCOPY OF MULTIPLE CORONARY ARTERIES USING LOW OSMOLAR CONTRAST: ICD-10-PCS | Performed by: INTERNAL MEDICINE

## 2022-11-30 PROCEDURE — 93460 R&L HRT ART/VENTRICLE ANGIO: CPT | Performed by: INTERNAL MEDICINE

## 2022-11-30 PROCEDURE — 74011250637 HC RX REV CODE- 250/637: Performed by: INTERNAL MEDICINE

## 2022-11-30 PROCEDURE — 4A023N8 MEASUREMENT OF CARDIAC SAMPLING AND PRESSURE, BILATERAL, PERCUTANEOUS APPROACH: ICD-10-PCS | Performed by: INTERNAL MEDICINE

## 2022-11-30 PROCEDURE — 77030004521 HC CATH ANGI DX COOK -B: Performed by: INTERNAL MEDICINE

## 2022-11-30 PROCEDURE — 74011000258 HC RX REV CODE- 258: Performed by: INTERNAL MEDICINE

## 2022-11-30 PROCEDURE — C1751 CATH, INF, PER/CENT/MIDLINE: HCPCS | Performed by: INTERNAL MEDICINE

## 2022-11-30 PROCEDURE — 99152 MOD SED SAME PHYS/QHP 5/>YRS: CPT | Performed by: INTERNAL MEDICINE

## 2022-11-30 PROCEDURE — 85025 COMPLETE CBC W/AUTO DIFF WBC: CPT

## 2022-11-30 PROCEDURE — C1769 GUIDE WIRE: HCPCS | Performed by: INTERNAL MEDICINE

## 2022-11-30 PROCEDURE — 77030016699 HC CATH ANGI DX INFN1 CARD -A: Performed by: INTERNAL MEDICINE

## 2022-11-30 PROCEDURE — 77030010221 HC SPLNT WR POS TELE -B: Performed by: INTERNAL MEDICINE

## 2022-11-30 PROCEDURE — 36415 COLL VENOUS BLD VENIPUNCTURE: CPT

## 2022-11-30 PROCEDURE — 74011000636 HC RX REV CODE- 636: Performed by: INTERNAL MEDICINE

## 2022-11-30 PROCEDURE — 77030008543 HC TBNG MON PRSS MRTM -A: Performed by: INTERNAL MEDICINE

## 2022-11-30 PROCEDURE — 94640 AIRWAY INHALATION TREATMENT: CPT

## 2022-11-30 RX ORDER — SODIUM CHLORIDE 0.9 % (FLUSH) 0.9 %
5-40 SYRINGE (ML) INJECTION EVERY 8 HOURS
Status: DISCONTINUED | OUTPATIENT
Start: 2022-11-30 | End: 2022-12-01 | Stop reason: HOSPADM

## 2022-11-30 RX ORDER — MIDAZOLAM HYDROCHLORIDE 1 MG/ML
INJECTION, SOLUTION INTRAMUSCULAR; INTRAVENOUS AS NEEDED
Status: DISCONTINUED | OUTPATIENT
Start: 2022-11-30 | End: 2022-11-30 | Stop reason: HOSPADM

## 2022-11-30 RX ORDER — VERAPAMIL HYDROCHLORIDE 2.5 MG/ML
INJECTION, SOLUTION INTRAVENOUS AS NEEDED
Status: DISCONTINUED | OUTPATIENT
Start: 2022-11-30 | End: 2022-11-30 | Stop reason: HOSPADM

## 2022-11-30 RX ORDER — IPRATROPIUM BROMIDE AND ALBUTEROL SULFATE 2.5; .5 MG/3ML; MG/3ML
3 SOLUTION RESPIRATORY (INHALATION)
Status: DISCONTINUED | OUTPATIENT
Start: 2022-11-30 | End: 2022-12-01 | Stop reason: HOSPADM

## 2022-11-30 RX ORDER — LIDOCAINE HYDROCHLORIDE 10 MG/ML
INJECTION INFILTRATION; PERINEURAL AS NEEDED
Status: DISCONTINUED | OUTPATIENT
Start: 2022-11-30 | End: 2022-11-30 | Stop reason: HOSPADM

## 2022-11-30 RX ORDER — HEPARIN SODIUM 1000 [USP'U]/ML
INJECTION, SOLUTION INTRAVENOUS; SUBCUTANEOUS AS NEEDED
Status: DISCONTINUED | OUTPATIENT
Start: 2022-11-30 | End: 2022-11-30 | Stop reason: HOSPADM

## 2022-11-30 RX ORDER — FENTANYL CITRATE 50 UG/ML
INJECTION, SOLUTION INTRAMUSCULAR; INTRAVENOUS AS NEEDED
Status: DISCONTINUED | OUTPATIENT
Start: 2022-11-30 | End: 2022-11-30 | Stop reason: HOSPADM

## 2022-11-30 RX ORDER — HEPARIN SODIUM 200 [USP'U]/100ML
INJECTION, SOLUTION INTRAVENOUS
Status: COMPLETED | OUTPATIENT
Start: 2022-11-30 | End: 2022-11-30

## 2022-11-30 RX ORDER — SODIUM CHLORIDE 0.9 % (FLUSH) 0.9 %
5-40 SYRINGE (ML) INJECTION AS NEEDED
Status: DISCONTINUED | OUTPATIENT
Start: 2022-11-30 | End: 2022-12-01 | Stop reason: HOSPADM

## 2022-11-30 RX ORDER — GUAIFENESIN 100 MG/5ML
LIQUID (ML) ORAL AS NEEDED
Status: DISCONTINUED | OUTPATIENT
Start: 2022-11-30 | End: 2022-11-30 | Stop reason: HOSPADM

## 2022-11-30 RX ADMIN — AZITHROMYCIN MONOHYDRATE 500 MG: 500 INJECTION, POWDER, LYOPHILIZED, FOR SOLUTION INTRAVENOUS at 00:13

## 2022-11-30 RX ADMIN — CEFTRIAXONE 1 G: 1 INJECTION, POWDER, FOR SOLUTION INTRAMUSCULAR; INTRAVENOUS at 01:21

## 2022-11-30 RX ADMIN — CARVEDILOL 6.25 MG: 3.12 TABLET, FILM COATED ORAL at 09:38

## 2022-11-30 RX ADMIN — SODIUM CHLORIDE, PRESERVATIVE FREE 10 ML: 5 INJECTION INTRAVENOUS at 09:39

## 2022-11-30 RX ADMIN — IPRATROPIUM BROMIDE AND ALBUTEROL SULFATE 3 ML: .5; 2.5 SOLUTION RESPIRATORY (INHALATION) at 07:26

## 2022-11-30 NOTE — PROGRESS NOTES
Hospitalist Progress Note    Patient: Darryle Mailman MRN: [id number]  CSN: [id number]    YOB: 1970  Age: 45 y.o. Sex: male    DOA: 11/27/2022 LOS:  LOS: 2 days                Assessment/Plan     Patient Active Problem List   Diagnosis Code    Acute on chronic systolic congestive heart failure (HCC) I50.23    Pneumonia J18.9    DM (diabetes mellitus) ([facility] E11.9        Chief complaint :  Chest pain  45 y.o.  male who has history of diabetes, hypertension, presents to the emergency room with complaints of chest pain and increasing dyspnea with minimal exertion over the last week. Pneumonia -  CTA chest with no PE  Showed bilateral pneumonia, likely pulmonary edema  On Ceftriaxone and azithromycin. Check PCT if normal, stop antibiotics. Acute on chronic systolic CHF -  Echo with EF of 30-35%. On lasix  Cardiology following. Ischemic work up, plan for cath tomorrow. DM -  On SSI  ADA diet    DVT prophylaxis with lovenox    Disposition : 1-2 days    Review of systems  General: No fevers or chills. Cardiovascular: No chest pain or pressure. No palpitations. Pulmonary: No shortness of breath. Gastrointestinal: No nausea, vomiting. Physical Exam:  General: Awake, cooperative, no acute distress    HEENT: NC, Atraumatic. PERRLA, anicteric sclerae. Lungs: CTA Bilaterally. No Wheezing/Rhonchi/Rales. Heart:  S1 S2,  No murmur, No Rubs, No Gallops  Abdomen: Soft, Non distended, Non tender. +Bowel sounds,   Extremities: No c/c/e  Psych:   Not anxious or agitated. Neurologic:  No acute neurological deficit. Vital signs/Intake and Output:  Visit Vitals  /88   Pulse 95   Temp 97.8 °F (36.6 °C)   Resp 18   Ht 6' (1.829 m)   Wt 97.8 kg (215 lb 9.8 oz)   SpO2 97%   BMI 29.24 kg/m²     Current Shift:  No intake/output data recorded.   Last three shifts:  11/28 0701 - 11/29 1900  In: 360 [P.O.:360]  Out: 600 [Urine:600]            Labs: Results:       Chemistry Recent Labs 11/29/22  0356 11/28/22  0255 11/27/22  1512   * 138* 132*    138 140   K 4.0 3.8 4.0    106 109   CO2 24 25 26   BUN 15 11 11   CREA 0.83 0.80 0.83   CA 10.6* 10.4* 10.4*   AGAP 8 7 5   BUCR 18 14 13    113 117   TP 7.5 7.0 7.2   ALB 3.4 3.3* 3.5   GLOB 4.1* 3.7 3.7   AGRAT 0.8 0.9 0.9      CBC w/Diff Recent Labs     11/29/22 0356 11/28/22  0255 11/27/22  1512   WBC 7.2 6.1 7.6   RBC 5.64 5.13 4.85   HGB 15.8 14.6 13.9   HCT 48.6* 44.4 41.6    269 288   GRANS 61 58 64   LYMPH 24 28 22   EOS 4 5 4      Cardiac Enzymes No results for input(s): CPK, CKND1, KEVIN in the last 72 hours. No lab exists for component: CKRMB, TROIP   Coagulation No results for input(s): PTP, INR, APTT, INREXT, INREXT in the last 72 hours. Lipid Panel No results found for: CHOL, CHOLPOCT, CHOLX, CHLST, CHOLV, 079643, HDL, HDLP, LDL, LDLC, DLDLP, 965129, VLDLC, VLDL, TGLX, TRIGL, TRIGP, TGLPOCT, CHHD, CHHDX   BNP No results for input(s): BNPP in the last 72 hours.    Liver Enzymes Recent Labs     11/29/22  0356   TP 7.5   ALB 3.4         Thyroid Studies No results found for: T4, T3U, TSH, TSHEXT, TSHEXT     Procedures/imaging: see electronic medical records for all procedures/Xrays and details which were not copied into this note but were reviewed prior to creation of Plan

## 2022-11-30 NOTE — PROGRESS NOTES
Cardiology Progress Note        Patient: Lulu Hernandez        Sex: male          DOA: 11/27/2022  YOB: 1970      Age:  45 y.o.        LOS:  LOS: 2 days   Assessment/Plan     Principal Problem:    Acute on chronic systolic congestive heart failure ([facility] (11/27/2022)    Active Problems:    Pneumonia (11/28/2022)      DM (diabetes mellitus) ([facility] (11/28/2022)      CHF (congestive heart failure) ([facility] (11/27/2022)      Overview: Added automatically from request for surgery [id number]      Cardiomyopathy [facility]) (11/27/2022)      Overview: Added automatically from request for surgery [id number]      Nonrheumatic mitral valve regurgitation (11/27/2022)      Overview: Added automatically from request for surgery [id number]        Plan:  Cardiac telemetry have shown nonsustained 9 beat run of VT  Increase the dose of carvedilol from 3.125 mg twice daily to 6.25 mg twice daily  Continue with Lasix  Will add losartan if blood pressure remains stable  Plan is to do right heart cath possible left heart cath with possible PCI tomorrow  Patient agreed with the above plan. Subjective:    cc:  Shortness of breath        REVIEW OF SYSTEMS:     General: No fevers or chills. Cardiovascular: No chest pain or pressure. No palpitations. No ankle swelling  Pulmonary: No SOB, orthopnea, PND  Gastrointestinal: No nausea, vomiting or diarrhea      Objective:      Visit Vitals  /88   Pulse 95   Temp 97.8 °F (36.6 °C)   Resp 18   Ht 6' (1.829 m)   Wt 97.8 kg (215 lb 9.8 oz)   SpO2 97%   BMI 29.24 kg/m²     Body mass index is 29.24 kg/m². Physical Exam:  General Appearance: Comfortable, not using accessory muscles of respiration. NECK: No JVD, no thyroidomeglay. LUNGS: Clear bilaterally. HEART: S1+S2 audible,    ABD: Non-tender, BS Audible    EXT: No edema, and no cysnosis. VASCULAR EXAM: Pulses are intact. PSYCHIATRIC EXAM: Mood is appropriate.     Medication:  Current Facility-Administered Medications   Medication Dose Route Frequency    cefTRIAXone (ROCEPHIN) 1 g in 0.9% sodium chloride (MBP/ADV) 50 mL MBP  1 g IntraVENous Q24H    azithromycin (ZITHROMAX) 500 mg in 0.9% sodium chloride 250 mL (VIAL-MATE)  500 mg IntraVENous Q24H    carvediloL (COREG) tablet 3.125 mg  3.125 mg Oral BID WITH MEALS    albuterol-ipratropium (DUO-NEB) 2.5 MG-0.5 MG/3 ML  3 mL Nebulization QID RT    furosemide (LASIX) injection 40 mg  40 mg IntraVENous Q12H    sodium chloride (NS) flush 5-40 mL  5-40 mL IntraVENous Q8H    sodium chloride (NS) flush 5-40 mL  5-40 mL IntraVENous PRN    acetaminophen (TYLENOL) tablet 650 mg  650 mg Oral Q6H PRN    Or    acetaminophen (TYLENOL) suppository 650 mg  650 mg Rectal Q6H PRN    polyethylene glycol (MIRALAX) packet 17 g  17 g Oral DAILY PRN    ondansetron (ZOFRAN ODT) tablet 4 mg  4 mg Oral Q8H PRN    Or    ondansetron (ZOFRAN) injection 4 mg  4 mg IntraVENous Q6H PRN    enoxaparin (LOVENOX) injection 40 mg  40 mg SubCUTAneous DAILY    potassium chloride SR (K-TAB) tablet 40 mEq  40 mEq Oral PRN    Or    potassium bicarb-citric acid (EFFER-K) tablet 40 mEq  40 mEq Oral PRN    Or    potassium chloride 10 mEq in 100 ml IVPB  10 mEq IntraVENous PRN    magnesium sulfate 2 g/50 ml IVPB (premix or compounded)  2 g IntraVENous PRN    insulin lispro (HUMALOG) injection   SubCUTAneous AC&HS    glucose chewable tablet 16 g  4 Tablet Oral PRN    glucagon (GLUCAGEN) injection 1 mg  1 mg IntraMUSCular PRN    dextrose 10% infusion 0-250 mL  0-250 mL IntraVENous PRN               Lab/Data Reviewed:  Procedures/imaging: see electronic medical records for all procedures/Xrays   and details which were not copied into this note but were reviewed prior to creation of Plan       All lab results for the last 24 hours reviewed.      Recent Labs     11/29/22  0356 11/28/22  0255 11/27/22  1512   WBC 7.2 6.1 7.6   HGB 15.8 14.6 13.9   HCT 48.6* 44.4 41.6    269 288     Recent Labs 11/29/22  0356 11/28/22  0255 11/27/22  1512    138 140   K 4.0 3.8 4.0    106 109   CO2 24 25 26   * 138* 132*   BUN 15 11 11   CREA 0.83 0.80 0.83   CA 10.6* 10.4* 10.4*       RADIOLOGY:  CT Results  (Last 48 hours)                 11/27/22 2013  CTA CHEST W OR W WO CONT Final result    Impression:      1. No evidence of pulmonary embolism. 2.  Bilateral pneumonia. 3. Hilar enlarged lymph nodes, likely reactive. Narrative:  EXAM: CTA chest       INDICATION: Pain. COMPARISON: None       TECHNIQUE: Axial CT imaging from the thoracic inlet through the diaphragm with   intravenous contrast. Coronal and sagittal MIP reformats were generated. One or more dose reduction techniques were used on this CT: automated exposure   control, adjustment of the mAs and/or kVp according to patient size, and   iterative reconstruction techniques. The specific techniques used on this CT   exam have been documented in the patient's electronic medical record. Digital   Imaging and Communications in Medicine (DICOM) format image data are available   to nonaffiliated external healthcare facilities or entities on a secure, media   free, reciprocally searchable basis with patient authorization for at least a   12-month period after this study. _______________       FINDINGS:       EXAM QUALITY: Adequate. PULMONARY ARTERIES: No evidence of pulmonary embolism. MEDIASTINUM: Normal heart size. No evidence of right heart strain. Aorta is   unremarkable. No pericardial effusion. LUNGS: No suspicious nodule or mass. Diffuse patchy densities primarily in the   upper lobes. Right basilar atelectasis. PLEURA: Normal.       AIRWAY: Normal.       LYMPH NODES: Bilateral hilar enlarged lymph nodes with the right side measuring   2.3 x 1.5 cm. UPPER ABDOMEN: Left renal cyst, incompletely imaged. .       OTHER: No acute or aggressive osseous abnormalities identified. _______________                 CXR Results  (Last 48 hours)      None              Cardiology Procedures:   Results for orders placed or performed during the hospital encounter of 11/27/22   EKG, 12 LEAD, INITIAL   Result Value Ref Range    Ventricular Rate 100 BPM    Atrial Rate 100 BPM    P-R Interval 136 ms    QRS Duration 100 ms    Q-T Interval 360 ms    QTC Calculation (Bezet) 464 ms    Calculated P Axis 71 degrees    Calculated R Axis 46 degrees    Calculated T Axis 60 degrees    Diagnosis       Normal sinus rhythm  Possible Left atrial enlargement  Left ventricular hypertrophy ( Sokolow-Bob , [facility] product )  Nonspecific T wave abnormality  Prolonged QT  Abnormal ECG  When compared with ECG of 16-OCT-2022 19:56,  premature ventricular complexes are no longer present    Confirmed by Flex Seals MD. ([id number]) on 11/27/2022 11:12:59 PM        Echo Results  (Last 48 hours)      None         Cardiolite (Tc-99m Sestamibi) stress test    Signed By: Mariel Morel MD     November 29, 2022

## 2022-11-30 NOTE — PROGRESS NOTES
Cardiology Progress Note        Patient: Kevin iSlveira        Sex: male          DOA: 11/27/2022  YOB: 1970      Age:  45 y.o.        LOS:  LOS: 3 days   Assessment/Plan     Principal Problem:    Acute on chronic systolic congestive heart failure ([facility] (11/27/2022)    Active Problems:    Pneumonia (11/28/2022)      DM (diabetes mellitus) ([facility] (11/28/2022)      CHF (congestive heart failure) ([facility] (11/27/2022)      Overview: Added automatically from request for surgery [id number]      Cardiomyopathy [facility]) (11/27/2022)      Overview: Added automatically from request for surgery [id number]      Nonrheumatic mitral valve regurgitation (11/27/2022)      Overview: Added automatically from request for surgery [id number]      Plan:    11/30/2022  Feeling better  Cardiac telemetry stable  Plan right heart catheterization, left heart catheterization possible PCI  Risks, benefits and alternatives were discussed in detail with the patient and patient agreed  Rest of the recommendation after cardiac catheterization. Cardiac telemetry have shown nonsustained 9 beat run of VT  Increase the dose of carvedilol from 3.125 mg twice daily to 6.25 mg twice daily  Continue with Lasix  Will add losartan if blood pressure remains stable  Plan is to do right heart cath possible left heart cath with possible PCI tomorrow  Patient agreed with the above plan. Subjective:    cc:  Shortness of breath        REVIEW OF SYSTEMS:     General: No fevers or chills. Cardiovascular: No chest pain or pressure. No palpitations. No ankle swelling  Pulmonary: No SOB, orthopnea, PND  Gastrointestinal: No nausea, vomiting or diarrhea      Objective:      Visit Vitals  BP (!) 116/98 (BP 1 Location: Left upper arm, BP Patient Position: At rest)   Pulse 78   Temp 98.5 °F (36.9 °C)   Resp 22   Ht 6' (1.829 m)   Wt 91.6 kg (202 lb)   SpO2 97%   BMI 27.40 kg/m²     Body mass index is 27.4 kg/m².     Physical Exam:  General Appearance: Comfortable, not using accessory muscles of respiration. NECK: No JVD, no thyroidomeglay. LUNGS: Clear bilaterally. HEART: S1+S2 audible,    ABD: Non-tender, BS Audible    EXT: No edema, and no cysnosis. VASCULAR EXAM: Pulses are intact. PSYCHIATRIC EXAM: Mood is appropriate.     Medication:  Current Facility-Administered Medications   Medication Dose Route Frequency    carvediloL (COREG) tablet 6.25 mg  6.25 mg Oral BID WITH MEALS    cefTRIAXone (ROCEPHIN) 1 g in 0.9% sodium chloride (MBP/ADV) 50 mL MBP  1 g IntraVENous Q24H    azithromycin (ZITHROMAX) 500 mg in 0.9% sodium chloride 250 mL (VIAL-MATE)  500 mg IntraVENous Q24H    albuterol-ipratropium (DUO-NEB) 2.5 MG-0.5 MG/3 ML  3 mL Nebulization QID RT    sodium chloride (NS) flush 5-40 mL  5-40 mL IntraVENous Q8H    sodium chloride (NS) flush 5-40 mL  5-40 mL IntraVENous PRN    acetaminophen (TYLENOL) tablet 650 mg  650 mg Oral Q6H PRN    Or    acetaminophen (TYLENOL) suppository 650 mg  650 mg Rectal Q6H PRN    polyethylene glycol (MIRALAX) packet 17 g  17 g Oral DAILY PRN    ondansetron (ZOFRAN ODT) tablet 4 mg  4 mg Oral Q8H PRN    Or    ondansetron (ZOFRAN) injection 4 mg  4 mg IntraVENous Q6H PRN    enoxaparin (LOVENOX) injection 40 mg  40 mg SubCUTAneous DAILY    potassium chloride SR (K-TAB) tablet 40 mEq  40 mEq Oral PRN    Or    potassium bicarb-citric acid (EFFER-K) tablet 40 mEq  40 mEq Oral PRN    Or    potassium chloride 10 mEq in 100 ml IVPB  10 mEq IntraVENous PRN    magnesium sulfate 2 g/50 ml IVPB (premix or compounded)  2 g IntraVENous PRN    insulin lispro (HUMALOG) injection   SubCUTAneous AC&HS    glucose chewable tablet 16 g  4 Tablet Oral PRN    glucagon (GLUCAGEN) injection 1 mg  1 mg IntraMUSCular PRN    dextrose 10% infusion 0-250 mL  0-250 mL IntraVENous PRN               Lab/Data Reviewed:  Procedures/imaging: see electronic medical records for all procedures/Xrays   and details which were not copied into this note but were reviewed prior to creation of Plan       All lab results for the last 24 hours reviewed.      Recent Labs     11/30/22  0700 11/29/22  0356 11/28/22  0255   WBC 6.8 7.2 6.1   HGB 16.4* 15.8 14.6   HCT 50.0* 48.6* 44.4    324 269       Recent Labs     11/30/22  0700 11/29/22  0356 11/28/22  0255    139 138   K 4.2 4.0 3.8    107 106   CO2 27 24 25   * 118* 138*   BUN 17 15 11   CREA 0.86 0.83 0.80   CA 10.7* 10.6* 10.4*         RADIOLOGY:  CT Results  (Last 48 hours)      None          CXR Results  (Last 48 hours)      None              Cardiology Procedures:   Results for orders placed or performed during the hospital encounter of 11/27/22   EKG, 12 LEAD, INITIAL   Result Value Ref Range    Ventricular Rate 100 BPM    Atrial Rate 100 BPM    P-R Interval 136 ms    QRS Duration 100 ms    Q-T Interval 360 ms    QTC Calculation (Bezet) 464 ms    Calculated P Axis 71 degrees    Calculated R Axis 46 degrees    Calculated T Axis 60 degrees    Diagnosis       Normal sinus rhythm  Possible Left atrial enlargement  Left ventricular hypertrophy ( Sokolow-Bob , [place] product )  Nonspecific T wave abnormality  Prolonged QT  Abnormal ECG  When compared with ECG of 16-OCT-2022 19:56,  premature ventricular complexes are no longer present    Confirmed by Glenn Potts MD. ([id number]) on 11/27/2022 11:12:59 PM        Echo Results  (Last 48 hours)      None         Cardiolite (Tc-99m Sestamibi) stress test    Signed By: Janny Contreras MD     November 30, 2022

## 2022-11-30 NOTE — PROGRESS NOTES
Pt. Is all prepped and ready for procedure. 1110 Pt. To cath lab via stretcher. 1215 Pt. Returned from cath lab, awake and alert. No c/o pain. TR band dry and intact right radial. PO fluids and food given. 1345 Starting to release TR band per protocol. 1410 TR band totally released. 2x2 gauze and tegaderm dressing applied. Right brachial sheath d/c'd and pressure applied for 10 minutes. 1420 2x2 gauze and tegaderm applied to brachial site. No bleeding from site. 1430 Pt. To bathroom via ambulatory. 1435 Pt. Returned from bathroom and voided and had BM. No complaints. 1530 Pt. Transferred to room 353 via bed in c/o nurse. No c/o pain. Pt. In stable condition. Dressing to right radial and right brachial dry and intact.

## 2022-11-30 NOTE — DISCHARGE INSTRUCTIONS
Cardiac Catheterization/Angiography Discharge Instructions    *Check the puncture site frequently for swelling or bleeding. If you see any bleeding, lie down and apply pressure over the area with a clean town or washcloth. Notify your doctor for any redness, swelling, drainage or oozing from the puncture site. Notify your doctor for any fever or chills. *If the leg or arm with the puncture becomes cold, numb or painful, go to ER    *Activity should be limited for the next 48 hours. Climb stairs as little as possible and avoid any stooping, bending or strenuous activity for 48 hours. No heavy lifting (anything over 10 pounds) for three days. *Do not drive for 48 hours. *You may resume your usual diet. Drink more fluids than usual.    *Have a responsible person drive you home and stay with you for at least 24 hours after your heart catheterization/angiography. *You may remove the bandage from your Right and Arm in 24 hours. You may shower in 24 hours. No tub baths, hot tubs or swimming for one week. Do not place any lotions, creams, powders, ointments over the puncture site for one week. You may place a clean band-aid over the puncture site each day for 5 days. Change this daily. DISCHARGE SUMMARY from Nurse    PATIENT INSTRUCTIONS:    After general anesthesia or intravenous sedation, for 24 hours or while taking prescription Narcotics:  Limit your activities  Do not drive and operate hazardous machinery  Do not make important personal or business decisions  Do  not drink alcoholic beverages  If you have not urinated within 8 hours after discharge, please contact your surgeon on call.     Report the following to your surgeon:  Excessive pain, swelling, redness or odor of or around the surgical area  Temperature over 100.5  Nausea and vomiting lasting longer than 4 hours or if unable to take medications  Any signs of decreased circulation or nerve impairment to extremity: change in color, persistent  numbness, tingling, coldness or increase pain  Any questions    What to do at Home:  Recommended activity: Activity as tolerated and no driving for today,         *  Please give a list of your current medications to your Primary Care Provider. *  Please update this list whenever your medications are discontinued, doses are      changed, or new medications (including over-the-counter products) are added. *  Please carry medication information at all times in case of emergency situations. These are general instructions for a healthy lifestyle:    No smoking/ No tobacco products/ Avoid exposure to second hand smoke  Surgeon General's Warning:  Quitting smoking now greatly reduces serious risk to your health. Obesity, smoking, and sedentary lifestyle greatly increases your risk for illness    A healthy diet, regular physical exercise & weight monitoring are important for maintaining a healthy lifestyle    You may be retaining fluid if you have a history of heart failure or if you experience any of the following symptoms:  Weight gain of 3 pounds or more overnight or 5 pounds in a week, increased swelling in our hands or feet or shortness of breath while lying flat in bed. Please call your doctor as soon as you notice any of these symptoms; do not wait until your next office visit. The discharge information has been reviewed with the patient. The patient verbalized understanding. Discharge medications reviewed with the patient and appropriate educational materials and side effects teaching were provided.   Patient armband removed and shredded    ___________________________________________________________________________________________________________________________________

## 2022-11-30 NOTE — PROGRESS NOTES
Hospitalist Progress Note    Patient: Leatha Lebron MRN: [id number]  CSN: [id number]    YOB: 1970  Age: 45 y.o. Sex: male    DOA: 11/27/2022 LOS:  LOS: 3 days         Assessment/Plan     Patient Active Problem List   Diagnosis Code    Acute on chronic systolic congestive heart failure (HCC) I50.23    Pneumonia J18.9    DM (diabetes mellitus) ([facility]) E11.9    CHF (congestive heart failure) ([facility]) I50.9    Cardiomyopathy ([facility] I42.9    Nonrheumatic mitral valve regurgitation I34.0        Chief complaint :  Chest pain  45 y.o.  male who has history of diabetes, hypertension, presents to the emergency room with complaints of chest pain and increasing dyspnea with minimal exertion over the last week. Pneumonia -  CTA chest with no PE  Showed bilateral pneumonia, likely pulmonary edema  On Ceftriaxone and azithromycin. Check PCT if normal, stop antibiotics. Acute on chronic systolic CHF -  Echo with EF of 30-35%. On lasix  Cardiology following. Ischemic work up, s/p left and right cath today, no PCI, patent coronary arteries,   Nonischemic CM     DM -  On SSI  ADA diet    DVT prophylaxis with lovenox    Disposition : 1-2 days    S: initially off the floor for cath       Review of systems  General: No fevers or chills. Cardiovascular: No chest pain or pressure. No palpitations. Pulmonary: No shortness of breath. Gastrointestinal: No nausea, vomiting. Physical Exam:  General: Awake, cooperative, no acute distress    HEENT: NC, Atraumatic. PERRLA, anicteric sclerae. Lungs: CTA Bilaterally. No Wheezing/Rhonchi/Rales. Heart:  S1 S2,  No murmur, No Rubs, No Gallops  Abdomen: Soft, Non distended, Non tender. +Bowel sounds,   Extremities: No c/c/e  Psych:   Not anxious or agitated. Neurologic:  No acute neurological deficit.          Vital signs/Intake and Output:  Visit Vitals  BP (!) 92/58 (BP 1 Location: Left upper arm, BP Patient Position: At rest)   Pulse 78   Temp 97.9 °F (36.6 °C) Resp 16   Ht 6' (1.829 m)   Wt 91.6 kg (202 lb)   SpO2 95%   BMI 27.40 kg/m²     Current Shift:  No intake/output data recorded. Last three shifts:  11/28 1901 - 11/30 0700  In: 240 [P.O.:240]  Out: 1425 [Urine:1425]            Labs: Results:       Chemistry Recent Labs     11/30/22  0700 11/29/22  0356 11/28/22  0255   * 118* 138*    139 138   K 4.2 4.0 3.8    107 106   CO2 27 24 25   BUN 17 15 11   CREA 0.86 0.83 0.80   CA 10.7* 10.6* 10.4*   AGAP 5 8 7   BUCR 20 18 14    116 113   TP 7.5 7.5 7.0   ALB 3.5 3.4 3.3*   GLOB 4.0 4.1* 3.7   AGRAT 0.9 0.8 0.9        CBC w/Diff Recent Labs     11/30/22  0700 11/29/22  0356 11/28/22  0255   WBC 6.8 7.2 6.1   RBC 5.83* 5.64 5.13   HGB 16.4* 15.8 14.6   HCT 50.0* 48.6* 44.4    324 269   GRANS 56 61 58   LYMPH 29 24 28   EOS 4 4 5        Cardiac Enzymes No results for input(s): CPK, CKND1, KEVIN in the last 72 hours. No lab exists for component: CKRMB, TROIP   Coagulation No results for input(s): PTP, INR, APTT, INREXT, INREXT in the last 72 hours. Lipid Panel No results found for: CHOL, CHOLPOCT, CHOLX, CHLST, CHOLV, 032243, HDL, HDLP, LDL, LDLC, DLDLP, 880969, VLDLC, VLDL, TGLX, TRIGL, TRIGP, TGLPOCT, CHHD, CHHDX   BNP No results for input(s): BNPP in the last 72 hours.    Liver Enzymes Recent Labs     11/30/22  0700   TP 7.5   ALB 3.5           Thyroid Studies No results found for: T4, T3U, TSH, TSHEXT, TSHEXT     Procedures/imaging: see electronic medical records for all procedures/Xrays and details which were not copied into this note but were reviewed prior to creation of Plan

## 2022-11-30 NOTE — PROGRESS NOTES
Problem: Falls - Risk of  Goal: *Absence of Falls  Description: Document Sofia Fothergill Fall Risk and appropriate interventions in the flowsheet. Outcome: Progressing Towards Goal  Note: Fall Risk Interventions:            Medication Interventions: Teach patient to arise slowly, Patient to call before getting OOB                   Problem: Patient Education: Go to Patient Education Activity  Goal: Patient/Family Education  Outcome: Progressing Towards Goal     Problem: General Medical Care Plan  Goal: *Vital signs within specified parameters  Outcome: Progressing Towards Goal  Goal: *Labs within defined limits  Outcome: Progressing Towards Goal  Goal: *Absence of infection signs and symptoms  Outcome: Progressing Towards Goal  Goal: *Optimal pain control at patient's stated goal  Outcome: Progressing Towards Goal  Goal: *Skin integrity maintained  Outcome: Progressing Towards Goal  Goal: *Fluid volume balance  Outcome: Progressing Towards Goal  Goal: *Optimize nutritional status  Outcome: Progressing Towards Goal  Goal: *Anxiety reduced or absent  Outcome: Progressing Towards Goal  Goal: *Progressive mobility and function (eg: ADL's)  Outcome: Progressing Towards Goal     Problem: Patient Education: Go to Patient Education Activity  Goal: Patient/Family Education  Outcome: Progressing Towards Goal     Problem: Pain  Goal: *Control of Pain  Outcome: Progressing Towards Goal     Problem: Patient Education: Go to Patient Education Activity  Goal: Patient/Family Education  Outcome: Progressing Towards Goal     Problem: Tissue Perfusion - Cardiopulmonary, Altered  Goal: *Optimize tissue perfusion  Outcome: Progressing Towards Goal  Goal: *Absence of hypoxia  Outcome: Progressing Towards Goal     Problem: Patient Education: Go to Patient Education Activity  Goal: Patient/Family Education  Outcome: Progressing Towards Goal     Problem:  Moderate Sedation (Adult)  Goal: *Patent airway  Outcome: Progressing Towards Goal  Goal: *Adequate oxygenation  Outcome: Progressing Towards Goal  Goal: *Absence of aspiration  Outcome: Progressing Towards Goal  Goal: *Hemodynamically stable  Outcome: Progressing Towards Goal  Goal: *Optimal pain control at patient's stated goal  Outcome: Progressing Towards Goal  Goal: *Absence of nausea/vomiting  Outcome: Progressing Towards Goal  Goal: *Anxiety reduced or absent  Outcome: Progressing Towards Goal  Goal: *Absence of injury  Outcome: Progressing Towards Goal  Goal: *Level of consciousness returns to baseline  Outcome: Progressing Towards Goal  Goal: Interventions  Outcome: Progressing Towards Goal     Problem: Patient Education: Go to Patient Education Activity  Goal: Patient/Family Education  Outcome: Progressing Towards Goal     Problem: Patient Education: Go to Patient Education Activity  Goal: Patient/Family Education  Outcome: Progressing Towards Goal     Problem: Cath Lab Procedures: Pre-Procedure  Goal: Off Pathway (Use only if patient is Off Pathway)  Outcome: Progressing Towards Goal  Goal: Activity/Safety  Outcome: Progressing Towards Goal  Goal: Consults, if ordered  Outcome: Progressing Towards Goal  Goal: Diagnostic Test/Procedures  Outcome: Progressing Towards Goal  Goal: Nutrition/Diet  Outcome: Progressing Towards Goal  Goal: Discharge Planning  Outcome: Progressing Towards Goal  Goal: Medications  Outcome: Progressing Towards Goal  Goal: Respiratory  Outcome: Progressing Towards Goal  Goal: Treatments/Interventions/Procedures  Outcome: Progressing Towards Goal  Goal: Psychosocial  Outcome: Progressing Towards Goal  Goal: *Verbalize description of procedure  Outcome: Progressing Towards Goal  Goal: *Consent signed  Outcome: Progressing Towards Goal     Problem: Cath Lab Procedures: Post-Cath Day of Procedure (Initiate SCIP Measures for Post-Op Care)  Goal: Off Pathway (Use only if patient is Off Pathway)  Outcome: Progressing Towards Goal  Goal: Activity/Safety  Outcome: Progressing Towards Goal  Goal: Consults, if ordered  Outcome: Progressing Towards Goal  Goal: Diagnostic Test/Procedures  Outcome: Progressing Towards Goal  Goal: Nutrition/Diet  Outcome: Progressing Towards Goal  Goal: Discharge Planning  Outcome: Progressing Towards Goal  Goal: Medications  Outcome: Progressing Towards Goal  Goal: Respiratory  Outcome: Progressing Towards Goal  Goal: Treatments/Interventions/Procedures  Outcome: Progressing Towards Goal  Goal: Psychosocial  Outcome: Progressing Towards Goal  Goal: *Procedure site is without bleeding and signs of infection six hours post sheath removal  Outcome: Progressing Towards Goal  Goal: *Hemodynamically stable  Outcome: Progressing Towards Goal  Goal: *Optimal pain control at patient's stated goal  Outcome: Progressing Towards Goal     Problem: Cath Lab Procedures: Post-Cath Day 1  Goal: Off Pathway (Use only if patient is Off Pathway)  Outcome: Progressing Towards Goal  Goal: Activity/Safety  Outcome: Progressing Towards Goal  Goal: Diagnostic Test/Procedures  Outcome: Progressing Towards Goal  Goal: Nutrition/Diet  Outcome: Progressing Towards Goal  Goal: Discharge Planning  Outcome: Progressing Towards Goal  Goal: Medications  Outcome: Progressing Towards Goal  Goal: Respiratory  Outcome: Progressing Towards Goal  Goal: Treatments/Interventions/Procedures  Outcome: Progressing Towards Goal  Goal: Psychosocial  Outcome: Progressing Towards Goal

## 2022-11-30 NOTE — BRIEF OP NOTE
Brief Postoperative Note    Patient: Laverne Danielle  YOB: 1970  MRN: [id number]    Date of Procedure: 11/30/2022     Pre-Op Diagnosis: CHF (congestive heart failure) ([facility] [address] [I50.9]  Cardiomyopathy ([facility] [address] [I42.9]  Nonrheumatic mitral valve regurgitation [I34.0]    Post-Op Diagnosis: Same as preoperative diagnosis. Procedure(s):  LEFT AND RIGHT HEART CATH / CORONARY ANGIOGRAPHY    Surgeon(s):  Med Rivera MD    Surgical Assistant: None    Anesthesia: Other     Estimated Blood Loss (mL): Minimal    Complications: None    Specimens: * No specimens in log *     Implants: * No implants in log *    Drains: * No LDAs found *    Findings: Patent coronary arteries. Nonischemic cardiomyopathy  Stable right-sided pressure. Complete report to follow.     Electronically Signed by Vandana Sethi MD on 11/30/2022 at 12:29 PM

## 2022-11-30 NOTE — PROGRESS NOTES
Problem: Falls - Risk of  Goal: *Absence of Falls  Description: Document Vernia Colder Fall Risk and appropriate interventions in the flowsheet.   Outcome: Progressing Towards Goal  Note: Fall Risk Interventions:            Medication Interventions: Evaluate medications/consider consulting pharmacy, Patient to call before getting OOB, Teach patient to arise slowly                   Problem: General Medical Care Plan  Goal: *Vital signs within specified parameters  Outcome: Progressing Towards Goal  Goal: *Labs within defined limits  Outcome: Progressing Towards Goal  Goal: *Absence of infection signs and symptoms  Outcome: Progressing Towards Goal  Goal: *Optimal pain control at patient's stated goal  Outcome: Progressing Towards Goal  Goal: *Skin integrity maintained  Outcome: Progressing Towards Goal  Goal: *Fluid volume balance  Outcome: Progressing Towards Goal  Goal: *Optimize nutritional status  Outcome: Progressing Towards Goal  Goal: *Anxiety reduced or absent  Outcome: Progressing Towards Goal  Goal: *Progressive mobility and function (eg: ADL's)  Outcome: Progressing Towards Goal     Problem: Pain  Goal: *Control of Pain  Outcome: Progressing Towards Goal

## 2022-11-30 NOTE — PROGRESS NOTES
conducted an initial consultation and Spiritual Assessment for Bill Al, who is a 45 y.o.,male. Patients Primary Language is: [place]. According to the patients EMR Christianity Affiliation is: No preference. The reason the Patient came to the hospital is:   Patient Active Problem List    Diagnosis Date Noted    Pneumonia 11/28/2022    DM (diabetes mellitus) ([facility] [address] 11/28/2022    Acute on chronic systolic congestive heart failure ([facility] [address] 11/27/2022    CH[facility] (congestive heart failure) [email or web address] 11/27/2022    Cardiomyopathy ([email or web address] 11/27/2022    Nonrheumatic mitral valve regurgitation 11/27/2022        The  provided the following Interventions:  Initiated a relationship of care and support. Listened empathically. Provided information about Spiritual Care Services. Offered assurance of prayer on patient's behalf. Chart reviewed. The following outcomes where achieved:   confirmed Patient's Christianity Affiliation. Patient processed feeling about current hospitalization. Patient expressed gratitude for 's visit. Assessment:  Patient does not have any Orthodox/cultural needs that will affect patients preferences in health care. Plan:  Chaplains will continue to follow and will provide pastoral care on an as needed/requested basis.  recommends bedside caregivers page  on duty if patient shows signs of acute spiritual or emotional distress. Rev.  Tayla Alexander, Certified Respecting [address] [place] [address] Consultant  [facility]  [phone]

## 2022-11-30 NOTE — ROUTINE PROCESS
TRANSFER - OUT REPORT:    Verbal report given to caleb(name) on Pollo Castillo  being transferred to [facility](unit) for routine progression of care       Report consisted of patients Situation, Background, Assessment and   Recommendations(SBAR). Information from the following report(s) SBAR, Kardex, Procedure Summary, Intake/Output, MAR, Cardiac Rhythm NSR, ST with PVC, and Dual Neuro Assessment was reviewed with the receiving nurse. Lines:   Peripheral IV 11/27/22 Left Forearm (Active)   Site Assessment Clean, dry, & intact 11/30/22 1017   Phlebitis Assessment 0 11/30/22 1017   Infiltration Assessment 0 11/30/22 1017   Dressing Status Clean, dry, & intact 11/30/22 1017   Dressing Type Tape;Transparent 11/30/22 1017   Hub Color/Line Status Pink 11/30/22 1017   Action Taken Open ports on tubing capped 11/30/22 1017   Alcohol Cap Used Yes 11/30/22 1017       Peripheral IV 11/30/22 Right Antecubital (Active)   Site Assessment Clean, dry, & intact 11/30/22 1020   Phlebitis Assessment 0 11/30/22 1020   Infiltration Assessment 0 11/30/22 1020   Dressing Status Clean, dry, & intact 11/30/22 1020   Dressing Type Tape 11/30/22 1020   Hub Color/Line Status Pink;Flushed 11/30/22 1020   Action Taken Open ports on tubing capped 11/30/22 1020   Alcohol Cap Used Yes 11/30/22 1020        Opportunity for questions and clarification was provided.       Patient transported with:   Monitor  Registered Nurse

## 2022-12-01 VITALS
HEIGHT: 72 IN | WEIGHT: 202 LBS | HEART RATE: 79 BPM | OXYGEN SATURATION: 100 % | DIASTOLIC BLOOD PRESSURE: 76 MMHG | RESPIRATION RATE: 18 BRPM | SYSTOLIC BLOOD PRESSURE: 90 MMHG | BODY MASS INDEX: 27.36 KG/M2 | TEMPERATURE: 98.6 F

## 2022-12-01 LAB
GLUCOSE BLD STRIP.AUTO-MCNC: 128 MG/DL (ref 70–110)
GLUCOSE BLD STRIP.AUTO-MCNC: 98 MG/DL (ref 70–110)
MAGNESIUM SERPL-MCNC: 2.2 MG/DL (ref 1.6–2.6)
PROCALCITONIN SERPL-MCNC: <0.05 NG/ML

## 2022-12-01 PROCEDURE — 74011000250 HC RX REV CODE- 250: Performed by: INTERNAL MEDICINE

## 2022-12-01 PROCEDURE — 82962 GLUCOSE BLOOD TEST: CPT

## 2022-12-01 PROCEDURE — 84145 PROCALCITONIN (PCT): CPT

## 2022-12-01 PROCEDURE — 83735 ASSAY OF MAGNESIUM: CPT

## 2022-12-01 PROCEDURE — 74011250637 HC RX REV CODE- 250/637: Performed by: INTERNAL MEDICINE

## 2022-12-01 PROCEDURE — 74011000258 HC RX REV CODE- 258: Performed by: INTERNAL MEDICINE

## 2022-12-01 PROCEDURE — 36415 COLL VENOUS BLD VENIPUNCTURE: CPT

## 2022-12-01 PROCEDURE — 74011250636 HC RX REV CODE- 250/636: Performed by: INTERNAL MEDICINE

## 2022-12-01 RX ORDER — AZITHROMYCIN 250 MG/1
500 TABLET, FILM COATED ORAL DAILY
Status: DISCONTINUED | OUTPATIENT
Start: 2022-12-02 | End: 2022-12-01 | Stop reason: HOSPADM

## 2022-12-01 RX ORDER — CARVEDILOL 3.12 MG/1
3.12 TABLET ORAL 2 TIMES DAILY WITH MEALS
Qty: 60 TABLET | Refills: 2 | Status: SHIPPED | OUTPATIENT
Start: 2022-12-01

## 2022-12-01 RX ADMIN — AZITHROMYCIN MONOHYDRATE 500 MG: 500 INJECTION, POWDER, LYOPHILIZED, FOR SOLUTION INTRAVENOUS at 02:17

## 2022-12-01 RX ADMIN — CEFTRIAXONE 1 G: 1 INJECTION, POWDER, FOR SOLUTION INTRAMUSCULAR; INTRAVENOUS at 02:18

## 2022-12-01 RX ADMIN — SODIUM CHLORIDE, PRESERVATIVE FREE 10 ML: 5 INJECTION INTRAVENOUS at 15:18

## 2022-12-01 RX ADMIN — CARVEDILOL 6.25 MG: 3.12 TABLET, FILM COATED ORAL at 10:02

## 2022-12-01 RX ADMIN — ENOXAPARIN SODIUM 40 MG: 100 INJECTION SUBCUTANEOUS at 10:03

## 2022-12-01 NOTE — PROGRESS NOTES
Cardiology Progress Note        Patient: King Joan        Sex: male          DOA: 11/27/2022  YOB: 1970      Age:  45 y.o.        LOS:  LOS: 4 days   Assessment/Plan     Principal Problem:    Acute on chronic systolic congestive heart failure ([facility] [address] (11/27/2022)    Active Problems:    Pneumonia (11/28/2022)      DM (diabetes mellitus) ([facility] [address] (11/28/2022)      CHF (congestive heart failure) ([facility] [address] (11/27/2022)      Overview: Added automatically from request for surgery [id number]      Cardiomyopathy [facility]) (11/27/2022)      Overview: Added automatically from request for surgery [id number]      Nonrheumatic mitral valve regurgitation (11/27/2022)      Overview: Added automatically from request for surgery [id number]      Plan:    12/1/2022  Pain  Cardiac telemetry stable  I have discussed in detail with the patient counseled the patient about risk factor management lifestyle changes compliance with diet medication and follow-up  I clinically suspect high probability for sleep apnea during the Procedure  Patient will need follow-up echocardiogram in 2 to 3 months  If EF remains less than 35% then he will need AICD implantation  Patient is not a candidate at this point for diuretics as well as ACE inhibitor/ARB due to low normal blood pressure  No edema lungs are clear  Patient can be discharged from cardiac standpoint  Discussed with Dr. Riri Robertson. 11/30/2022  Feeling better  Cardiac telemetry stable  Plan right heart catheterization, left heart catheterization possible PCI  Risks, benefits and alternatives were discussed in detail with the patient and patient agreed  Rest of the recommendation after cardiac catheterization.     Cardiac telemetry have shown nonsustained 9 beat run of VT  Increase the dose of carvedilol from 3.125 mg twice daily to 6.25 mg twice daily  Continue with Lasix  Will add losartan if blood pressure remains stable  Plan is to do right heart cath possible left heart cath with possible PCI tomorrow  Patient agreed with the above plan. Subjective:    cc:  Shortness of breath        REVIEW OF SYSTEMS:     General: No fevers or chills. Cardiovascular: No chest pain or pressure. No palpitations. No ankle swelling  Pulmonary: No SOB, orthopnea, PND  Gastrointestinal: No nausea, vomiting or diarrhea      Objective:      Visit Vitals  BP 90/76   Pulse 79   Temp 98.6 °F (37 °C)   Resp 18   Ht 6' (1.829 m)   Wt 91.6 kg (202 lb)   SpO2 100%   BMI 27.40 kg/m²     Body mass index is 27.4 kg/m². Physical Exam:  General Appearance: Comfortable, not using accessory muscles of respiration. NECK: No JVD, no thyroidomeglay. LUNGS: Clear bilaterally. HEART: S1+S2 audible,    ABD: Non-tender, BS Audible    EXT: No edema, and no cysnosis. VASCULAR EXAM: Pulses are intact. PSYCHIATRIC EXAM: Mood is appropriate.     Medication:  Current Facility-Administered Medications   Medication Dose Route Frequency    [START ON 12/2/2022] azithromycin (ZITHROMAX) tablet 500 mg  500 mg Oral DAILY    albuterol-ipratropium (DUO-NEB) 2.5 MG-0.5 MG/3 ML  3 mL Nebulization Q4H PRN    sodium chloride (NS) flush 5-40 mL  5-40 mL IntraVENous Q8H    sodium chloride (NS) flush 5-40 mL  5-40 mL IntraVENous PRN    carvediloL (COREG) tablet 6.25 mg  6.25 mg Oral BID WITH MEALS    cefTRIAXone (ROCEPHIN) 1 g in 0.9% sodium chloride (MBP/ADV) 50 mL MBP  1 g IntraVENous Q24H    sodium chloride (NS) flush 5-40 mL  5-40 mL IntraVENous Q8H    sodium chloride (NS) flush 5-40 mL  5-40 mL IntraVENous PRN    acetaminophen (TYLENOL) tablet 650 mg  650 mg Oral Q6H PRN    Or    acetaminophen (TYLENOL) suppository 650 mg  650 mg Rectal Q6H PRN    polyethylene glycol (MIRALAX) packet 17 g  17 g Oral DAILY PRN    ondansetron (ZOFRAN ODT) tablet 4 mg  4 mg Oral Q8H PRN    Or    ondansetron (ZOFRAN) injection 4 mg  4 mg IntraVENous Q6H PRN    enoxaparin (LOVENOX) injection 40 mg  40 mg SubCUTAneous DAILY potassium chloride SR (K-TAB) tablet 40 mEq  40 mEq Oral PRN    Or    potassium bicarb-citric acid (EFFER-K) tablet 40 mEq  40 mEq Oral PRN    Or    potassium chloride 10 mEq in 100 ml IVPB  10 mEq IntraVENous PRN    magnesium sulfate 2 g/50 ml IVPB (premix or compounded)  2 g IntraVENous PRN    insulin lispro (HUMALOG) injection   SubCUTAneous AC&HS    glucose chewable tablet 16 g  4 Tablet Oral PRN    glucagon (GLUCAGEN) injection 1 mg  1 mg IntraMUSCular PRN    dextrose 10% infusion 0-250 mL  0-250 mL IntraVENous PRN               Lab/Data Reviewed:  Procedures/imaging: see electronic medical records for all procedures/Xrays   and details which were not copied into this note but were reviewed prior to creation of Plan       All lab results for the last 24 hours reviewed.      Recent Labs     11/30/22  0700 11/29/22  0356   WBC 6.8 7.2   HGB 16.4* 15.8   HCT 50.0* 48.6*    324       Recent Labs     11/30/22  0700 11/29/22  0356    139   K 4.2 4.0    107   CO2 27 24   * 118*   BUN 17 15   CREA 0.86 0.83   CA 10.7* 10.6*         RADIOLOGY:  CT Results  (Last 48 hours)      None          CXR Results  (Last 48 hours)      None              Cardiology Procedures:   Results for orders placed or performed during the hospital encounter of 11/27/22   EKG, 12 LEAD, INITIAL   Result Value Ref Range    Ventricular Rate 100 BPM    Atrial Rate 100 BPM    P-R Interval 136 ms    QRS Duration 100 ms    Q-T Interval 360 ms    QTC Calculation (Bezet) 464 ms    Calculated P Axis 71 degrees    Calculated R Axis 46 degrees    Calculated T Axis 60 degrees    Diagnosis       Normal sinus rhythm  Possible Left atrial enlargement  Left ventricular hypertrophy ( Sokolow-Bob , Yang product )  Nonspecific T wave abnormality  Prolonged QT  Abnormal ECG  When compared with ECG of 16-OCT-2022 19:56,  premature ventricular complexes are no longer present    Confirmed by Elisha Yan MD. ([id number]) on 11/27/2022 11:12:59 PM        Echo Results  (Last 48 hours)      None         Cardiolite (Tc-99m Sestamibi) stress test    Signed By: Ollie Lopez MD     December 1, 2022

## 2022-12-01 NOTE — PROGRESS NOTES
IV to PO Conversion Recommendation     Patient: Tessa Hall   MRN#: [id number]   Admission Date: [id number]     IV TO PO  Medication(s) Azithromycin   Oral Meds  Yes   Diet:     Active Orders   Diet    ADULT DIET Regular; 3 carb choices (45 gm/meal); Low Fat/Low Chol/High Fiber/MICHELLE; No Salt Added (3-4 gm)      TEMP 97.8 °F (36.6 °C)   WBC Lab Results   Component Value Date/Time    WBC 6.8 11/30/2022 07:00 AM           Pharmacy Dosing Services:  Summary:   Does the patient meet all criteria for IV to PO switch as listed below? Yes   If no, list:    Is the patient excluded from IV to PO automatic switch based on criteria listed below? No   If yes, list:      Criteria for IV to PO switch - Antibiotics   Received IV therapy for at least 24 hours   2. Functioning GI tract   Taking scheduled oral medications  Tolerating diet more advanced than clear liquids   3. No signs/symptoms of shock  No vasopressor support    Criteria for IV to PO switch - Nonantibiotics   Functioning GI tract   Taking scheduled oral medications  Toleratind duet more advanced than clear liquids  2. No signs/symptoms of shock  No vasopressor support        3. No seizures for >72 hours (antiepileptic medications only)    Exclusion Criteria   Patient is being treated for an infection that requires IV therapy such as: endocarditis, osteomyelitis, meningitis, pancreatitis (antibiotics only)   NG tube with continous suction   Nausea and/or vomiting or severe diarrhea within past 24 hours  Malabsorption syndromes, partial or total gastrectomy, ileus, gastric outlet or bowel obstruction  Active GI bleeding   Significant painful oral ulceration  Unable to swallow  On total parenteral nutrition with a NPO order  NPO  Febrile in last 24 hours (antibiotics only)  Clinically deteriorating or unstable (antibiotics only)    This IV to PO conversion is based on the [facility] P&T approved automatic conversion policy for eligible patients.       Please call with questions.     Sinai Andrea  [email or web address] [phone]

## 2022-12-01 NOTE — DISCHARGE SUMMARY
Discharge Summary    Patient: Darryle Mailman MRN: [id number]  CSN: [id number]    YOB: 1970  Age: 45 y.o. Sex: male    DOA: 11/27/2022 LOS:  LOS: 4 days   Discharge Date:      Primary Care Provider:  None    Admission Diagnoses: CHF (congestive heart failure) ([facility] [I50.9]    Discharge Diagnoses:    Problem List as of 12/1/2022 Date Reviewed: 11/28/2022            Codes Class Noted - Resolved    DM (diabetes mellitus) ([facility] ICD-10-CM: E11.9  ICD-9-CM: 250.00  11/28/2022 - Present        * (Principal) Acute on chronic systolic congestive heart failure ([facility] ICD-10-CM: I50.23  ICD-9-CM: 428.23, 428.0  11/27/2022 - Present        CHF (congestive heart failure) ([facility] ICD-10-CM: I50.9  ICD-9-CM: 428.0  11/27/2022 - Present    Overview Signed 11/29/2022  7:51 PM by Emmanuel Hill MD     Added automatically from request for surgery [id number]             Cardiomyopathy [facility]) ICD-10-CM: I42.9  ICD-9-CM: 425.4  11/27/2022 - Present    Overview Signed 11/29/2022  7:51 PM by Emmanuel Hill MD     Added automatically from request for surgery [id number]             Nonrheumatic mitral valve regurgitation ICD-10-CM: I34.0  ICD-9-CM: 424.0  11/27/2022 - Present    Overview Signed 11/29/2022  7:51 PM by Emmanuel Hill MD     Added automatically from request for surgery [id number]                Discharge Medications:     Current Discharge Medication List        START taking these medications    Details   carvediloL (Coreg) 3.125 mg tablet Take 1 Tablet by mouth two (2) times daily (with meals).   Qty: 60 Tablet, Refills: 2  Start date: 12/1/2022           STOP taking these medications       methocarbamoL (Robaxin-750) 750 mg tablet Comments:   Reason for Stopping:         metFORMIN (GLUCOPHAGE) 500 mg tablet Comments:   Reason for Stopping:               Discharge Condition: Good    Procedures : coronary angiogram    Consults: Cardiology      PHYSICAL EXAM   Visit Vitals  BP 90/76   Pulse 79   Temp 98.6 °F (37 °C)   Resp 18 Ht 6' (1.829 m)   Wt 91.6 kg (202 lb)   SpO2 100%   BMI 27.40 kg/m²     General: Awake, cooperative, no acute distress    HEENT: NC, Atraumatic. PERRLA, EOMI. Anicteric sclerae. Lungs:  CTA Bilaterally. No Wheezing/Rhonchi/Rales. Heart:  Regular  rhythm,  No murmur, No Rubs, No Gallops  Abdomen: Soft, Non distended, Non tender. +Bowel sounds,   Extremities: No c/c/e  Psych:   Not anxious or agitated. Neurologic:  No acute neurological deficits. Admission HPI :   Davey Forde is a 45 y.o.  male who has history of diabetes, hypertension, presents to the emergency room with complaints of chest pain and increasing dyspnea with minimal exertion over the last week. He denies lower extremity edema or abdominal distention but does have an associated cough with congestion. He has history of fluid in his lungs in the past had not seen a cardiologist as was instructed. He did have some chest pain that was intermittent over the last 3 days and worsening fatigue. In the emergency room he had some hypoxemia with sats of 85% this improved with IV Lasix and oxygen he diuresed with improvement of hypoxemia initial chest x-ray showed fluid overload CT of the chest shows pneumonia there is no evidence of PE ABG shows hypoxemia Emerphed admitted for tachypnea hypoxemia new CHF and pneumonia    Hospital Course :   Mr. Babatunde Mcintyre was admitted to monitored floor, he was seen and followed by cardiology. Acute on chronic systolic CHF -  Echo with EF of 30-35%. Initially started on lasix  Coronary angiogram with normal coronaries  Started on low dose betablocker. Cannot start on ACE-I/ARB due to low blood pressure. Follow up with cardiology, need repeat echo in 2-3 months  Fluid restriction to 1.5L/d, salt restriction to 2g/day.      DM -  Started on SSI  ADA diet      Activity: Activity as tolerated    Diet: Cardiac Diet    Follow-up: PCP, cardiology    Disposition: home    Minutes spent on discharge: 45       Labs: Results:       Chemistry Recent Labs     11/30/22  0700 11/29/22  0356   * 118*    139   K 4.2 4.0    107   CO2 27 24   BUN 17 15   CREA 0.86 0.83   CA 10.7* 10.6*   AGAP 5 8   BUCR 20 18    116   TP 7.5 7.5   ALB 3.5 3.4   GLOB 4.0 4.1*   AGRAT 0.9 0.8      CBC w/Diff Recent Labs     11/30/22  0700 11/29/22  0356   WBC 6.8 7.2   RBC 5.83* 5.64   HGB 16.4* 15.8   HCT 50.0* 48.6*    324   GRANS 56 61   LYMPH 29 24   EOS 4 4      Cardiac Enzymes No results for input(s): CPK, CKND1, KEVIN in the last 72 hours. No lab exists for component: CKRMB, TROIP   Coagulation No results for input(s): PTP, INR, APTT, INREXT, INREXT in the last 72 hours. Lipid Panel No results found for: CHOL, CHOLPOCT, CHOLX, CHLST, CHOLV, 710130, HDL, HDLP, LDL, LDLC, DLDLP, 642598, VLDLC, VLDL, TGLX, TRIGL, TRIGP, TGLPOCT, CHHD, CHHDX   BNP No results for input(s): BNPP in the last 72 hours. Liver Enzymes Recent Labs     11/30/22  0700   TP 7.5   ALB 3.5         Thyroid Studies No results found for: T4, T3U, TSH, TSHEXT, TSHEXT         Significant Diagnostic Studies: CTA CHEST W OR W WO CONT    Result Date: 11/27/2022  EXAM: CTA chest INDICATION: Pain. COMPARISON: None TECHNIQUE: Axial CT imaging from the thoracic inlet through the diaphragm with intravenous contrast. Coronal and sagittal MIP reformats were generated. One or more dose reduction techniques were used on this CT: automated exposure control, adjustment of the mAs and/or kVp according to patient size, and iterative reconstruction techniques. The specific techniques used on this CT exam have been documented in the patient's electronic medical record.   Digital Imaging and Communications in Medicine (DICOM) format image data are available to nonaffiliated external healthcare facilities or entities on a secure, media free, reciprocally searchable basis with patient authorization for at least a 12-month period after this study. _______________ FINDINGS: EXAM QUALITY: Adequate. PULMONARY ARTERIES: No evidence of pulmonary embolism. MEDIASTINUM: Normal heart size. No evidence of right heart strain. Aorta is unremarkable. No pericardial effusion. LUNGS: No suspicious nodule or mass. Diffuse patchy densities primarily in the upper lobes. Right basilar atelectasis. PLEURA: Normal. AIRWAY: Normal. LYMPH NODES: Bilateral hilar enlarged lymph nodes with the right side measuring 2.3 x 1.5 cm. UPPER ABDOMEN: Left renal cyst, incompletely imaged. . OTHER: No acute or aggressive osseous abnormalities identified. _______________     1. No evidence of pulmonary embolism. 2.  Bilateral pneumonia. 3. Hilar enlarged lymph nodes, likely reactive. XR CHEST PORT    Result Date: 11/27/2022  EXAM: CHEST RADIOGRAPH CLINICAL INDICATION/HISTORY: chest pain   > Additional: None COMPARISON: 10/16/2022. TECHNIQUE: Portable frontal view of the chest _______________ FINDINGS: SUPPORT DEVICES: None. HEART AND MEDIASTINUM: Stable cardiac size. Remaining mediastinal contours within normal limits. LUNGS AND PLEURAL SPACES: Interstitial markings are prominent but stable. No evidence for pneumothorax or pleural effusion. BONY THORAX AND SOFT TISSUES: Unremarkable. _______________     1. No significant change of pulmonary edema. ECHO ADULT COMPLETE    Result Date: 11/28/2022    Left Ventricle: Reduced left ventricular systolic function with a visually estimated EF of 30 - 35%. Left ventricle is severely dilated. Normal wall thickness. Severe global hypokinesis present. Grade II diastolic dysfunction with increased LAP. Aortic Valve: Tricuspid valve. Mitral Valve: Mildly thickened leaflet, at the anterior leaflet. Mild regurgitation. Left Atrium: Left atrium is mildly dilated.      CARDIAC PROCEDURE    Result Date: 12/1/2022  Patent coronary arteries with minimal luminal irregularities LVEDP 6 mmHg PCWP 2 mmHg PA 17/7 mean PA pressure 11 mmHg Findings are consistent with nonischemic cardiomyopathy. Discussed with patient about compliance to antihypertensive medication, diet, consider sleep study assessment for further management. No results found for this or any previous visit. Please note that this dictation was completed with [employer], the computer voice recognition software. Quite often unanticipated grammatical, syntax, homophones, and other interpretive errors are inadvertently transcribed by the computer software. Please disregard these errors. Please excuse any errors that have escaped final proofreading.      CC: None

## 2022-12-01 NOTE — PROGRESS NOTES
CM and provider met with pt at bedside to discuss care transition. Anticipate pt will transition home with physician follow up [place] in the next 24-48 hours pending cardiac clearance. No other care transition needs have been identified. CM remains available to assist as needed. Care Management Interventions  Mode of Transport at Discharge:  Other (see comment) (Family/friend)  Transition of Care Consult (CM Consult): Discharge Planning  Health Maintenance Reviewed: Yes  Support Systems: Other Family Member(s), Friend/Neighbor  Confirm Follow Up Transport: Self  The Plan for Transition of Care is Related to the Following Treatment Goals : Home with physician follow up  Discharge Location  Patient Expects to be Discharged to[de-identified] Home with family assistance

## 2022-12-04 LAB
BACTERIA SPEC CULT: NORMAL
BACTERIA SPEC CULT: NORMAL
SERVICE CMNT-IMP: NORMAL
SERVICE CMNT-IMP: NORMAL

## 2022-12-26 ENCOUNTER — APPOINTMENT (OUTPATIENT)
Dept: GENERAL RADIOLOGY | Age: 52
End: 2022-12-26
Attending: EMERGENCY MEDICINE
Payer: MEDICAID

## 2022-12-26 ENCOUNTER — HOSPITAL ENCOUNTER (EMERGENCY)
Age: 52
Discharge: HOME | End: 2022-12-26
Attending: EMERGENCY MEDICINE
Payer: MEDICAID

## 2022-12-26 VITALS
HEIGHT: 73 IN | BODY MASS INDEX: 28.49 KG/M2 | HEART RATE: 87 BPM | RESPIRATION RATE: 13 BRPM | SYSTOLIC BLOOD PRESSURE: 122 MMHG | TEMPERATURE: 97.1 F | OXYGEN SATURATION: 97 % | WEIGHT: 215 LBS | DIASTOLIC BLOOD PRESSURE: 94 MMHG

## 2022-12-26 DIAGNOSIS — I50.23 ACUTE ON CHRONIC SYSTOLIC CONGESTIVE HEART FAILURE (HCC): Primary | ICD-10-CM

## 2022-12-26 LAB
ALBUMIN SERPL-MCNC: 3.4 G/DL (ref 3.4–5)
ALBUMIN/GLOB SERPL: 1.1 {RATIO} (ref 0.8–1.7)
ALP SERPL-CCNC: 139 U/L (ref 45–117)
ALT SERPL-CCNC: 100 U/L (ref 16–61)
ANION GAP SERPL CALC-SCNC: 4 MMOL/L (ref 3–18)
AST SERPL-CCNC: 75 U/L (ref 10–38)
ATRIAL RATE: 102 BPM
ATRIAL RATE: 106 BPM
BASOPHILS # BLD: 0 K/UL (ref 0–0.1)
BASOPHILS NFR BLD: 1 % (ref 0–2)
BILIRUB SERPL-MCNC: 0.6 MG/DL (ref 0.2–1)
BNP SERPL-MCNC: 5582 PG/ML (ref 0–900)
BUN SERPL-MCNC: 15 MG/DL (ref 7–18)
BUN/CREAT SERPL: 15 (ref 12–20)
CALCIUM SERPL-MCNC: 9.5 MG/DL (ref 8.5–10.1)
CALCULATED P AXIS, ECG09: 51 DEGREES
CALCULATED P AXIS, ECG09: 53 DEGREES
CALCULATED R AXIS, ECG10: 43 DEGREES
CALCULATED R AXIS, ECG10: 48 DEGREES
CALCULATED T AXIS, ECG11: 53 DEGREES
CALCULATED T AXIS, ECG11: 67 DEGREES
CHLORIDE SERPL-SCNC: 110 MMOL/L (ref 100–111)
CO2 SERPL-SCNC: 27 MMOL/L (ref 21–32)
CREAT SERPL-MCNC: 0.98 MG/DL (ref 0.6–1.3)
DIAGNOSIS, 93000: NORMAL
DIAGNOSIS, 93000: NORMAL
DIFFERENTIAL METHOD BLD: NORMAL
EOSINOPHIL # BLD: 0.2 K/UL (ref 0–0.4)
EOSINOPHIL NFR BLD: 4 % (ref 0–5)
ERYTHROCYTE [DISTWIDTH] IN BLOOD BY AUTOMATED COUNT: 14.5 % (ref 11.6–14.5)
GLOBULIN SER CALC-MCNC: 3.1 G/DL (ref 2–4)
GLUCOSE SERPL-MCNC: 122 MG/DL (ref 74–99)
HCT VFR BLD AUTO: 39.8 % (ref 36–48)
HGB BLD-MCNC: 13 G/DL (ref 13–16)
IMM GRANULOCYTES # BLD AUTO: 0 K/UL (ref 0–0.04)
IMM GRANULOCYTES NFR BLD AUTO: 0 % (ref 0–0.5)
INR PPP: 1.2 (ref 0.8–1.2)
LYMPHOCYTES # BLD: 1.9 K/UL (ref 0.9–3.6)
LYMPHOCYTES NFR BLD: 30 % (ref 21–52)
MCH RBC QN AUTO: 28.5 PG (ref 24–34)
MCHC RBC AUTO-ENTMCNC: 32.7 G/DL (ref 31–37)
MCV RBC AUTO: 87.3 FL (ref 78–100)
MONOCYTES # BLD: 0.6 K/UL (ref 0.05–1.2)
MONOCYTES NFR BLD: 10 % (ref 3–10)
NEUTS SEG # BLD: 3.5 K/UL (ref 1.8–8)
NEUTS SEG NFR BLD: 55 % (ref 40–73)
NRBC # BLD: 0 K/UL (ref 0–0.01)
NRBC BLD-RTO: 0 PER 100 WBC
P-R INTERVAL, ECG05: 152 MS
P-R INTERVAL, ECG05: 158 MS
PLATELET # BLD AUTO: 212 K/UL (ref 135–420)
PMV BLD AUTO: 11.2 FL (ref 9.2–11.8)
POTASSIUM SERPL-SCNC: 3.7 MMOL/L (ref 3.5–5.5)
PROT SERPL-MCNC: 6.5 G/DL (ref 6.4–8.2)
PROTHROMBIN TIME: 15.2 SEC (ref 11.5–15.2)
Q-T INTERVAL, ECG07: 384 MS
Q-T INTERVAL, ECG07: 388 MS
QRS DURATION, ECG06: 92 MS
QRS DURATION, ECG06: 96 MS
QTC CALCULATION (BEZET), ECG08: 505 MS
QTC CALCULATION (BEZET), ECG08: 510 MS
RBC # BLD AUTO: 4.56 M/UL (ref 4.35–5.65)
SODIUM SERPL-SCNC: 141 MMOL/L (ref 136–145)
TROPONIN-HIGH SENSITIVITY: 114 NG/L (ref 0–78)
TROPONIN-HIGH SENSITIVITY: 116 NG/L (ref 0–78)
VENTRICULAR RATE, ECG03: 102 BPM
VENTRICULAR RATE, ECG03: 106 BPM
WBC # BLD AUTO: 6.3 K/UL (ref 4.6–13.2)

## 2022-12-26 PROCEDURE — 93005 ELECTROCARDIOGRAM TRACING: CPT

## 2022-12-26 PROCEDURE — 80053 COMPREHEN METABOLIC PANEL: CPT

## 2022-12-26 PROCEDURE — 85610 PROTHROMBIN TIME: CPT

## 2022-12-26 PROCEDURE — 85025 COMPLETE CBC W/AUTO DIFF WBC: CPT

## 2022-12-26 PROCEDURE — 84484 ASSAY OF TROPONIN QUANT: CPT

## 2022-12-26 PROCEDURE — 74011250636 HC RX REV CODE- 250/636: Performed by: EMERGENCY MEDICINE

## 2022-12-26 PROCEDURE — 99285 EMERGENCY DEPT VISIT HI MDM: CPT

## 2022-12-26 PROCEDURE — 96374 THER/PROPH/DIAG INJ IV PUSH: CPT

## 2022-12-26 PROCEDURE — 83880 ASSAY OF NATRIURETIC PEPTIDE: CPT

## 2022-12-26 PROCEDURE — 71045 X-RAY EXAM CHEST 1 VIEW: CPT

## 2022-12-26 RX ORDER — FUROSEMIDE 40 MG/1
40 TABLET ORAL 2 TIMES DAILY
Qty: 14 TABLET | Refills: 0 | Status: SHIPPED | OUTPATIENT
Start: 2022-12-26 | End: 2023-01-02

## 2022-12-26 RX ORDER — FUROSEMIDE 10 MG/ML
40 INJECTION INTRAMUSCULAR; INTRAVENOUS
Status: COMPLETED | OUTPATIENT
Start: 2022-12-26 | End: 2022-12-26

## 2022-12-26 RX ADMIN — FUROSEMIDE 40 MG: 10 INJECTION, SOLUTION INTRAMUSCULAR; INTRAVENOUS at 11:47

## 2022-12-26 NOTE — ED PROVIDER NOTES
EMERGENCY DEPARTMENT HISTORY AND PHYSICAL EXAM    Date: 12/26/2022  Patient Name: Leatha Lebron    History of Presenting Illness     Chief Complaint   Patient presents with    Chest Pain         History Provided By: Patient    10:55 AM  Leatha Lebron is a 45 y.o. male with PMHX of diabetes, CHF, active tobacco smoker who presents to the emergency department C/O chest pain and shortness of breath. Patient reports he has had the symptoms for 1 to 2 months. He reports are steadily getting worse. He reports has had multiple hospital visits. He states he thinks he had a fever at home. He denies any cough, bowel or urinary complaints. He does note some vomiting. No known sick contacts or recent travel. No clear relieving or exacerbating factors identified. Describes the pain as a pressure in the center of his chest.  Rates the pain as 9 out of 10. PCP: None    Current Outpatient Medications   Medication Sig Dispense Refill    furosemide (Lasix) 40 mg tablet Take 1 Tablet by mouth two (2) times a day for 7 days. Take Lasix 40 mg PO twice daily for 7 days and then resume your normal 20 mg PO twice daily thereafter. 14 Tablet 0    carvediloL (Coreg) 3.125 mg tablet Take 1 Tablet by mouth two (2) times daily (with meals). 61 Tablet 2       Past History       Past Medical History:  Past Medical History:   Diagnosis Date    Diabetes ([facility]        Past Surgical History:  No past surgical history on file. Family History:  Family History   Problem Relation Age of Onset    Cancer Mother     Alcohol abuse Father        Social History:  Social History     Tobacco Use    Smoking status: Every Day     Packs/day: 1.00     Types: Cigarettes    Smokeless tobacco: Never   Vaping Use    Vaping Use: Every day    Substances: Flavoring   Substance Use Topics    Alcohol use: Yes    Drug use: Not Currently       Allergies:  No Known Allergies      Review of Systems   Review of Systems   Constitutional:  Positive for fever. Respiratory:  Positive for shortness of breath. Cardiovascular:  Positive for chest pain. Gastrointestinal:  Positive for nausea and vomiting. Negative for abdominal pain. All other systems reviewed and are negative.       Physical Exam     Vitals:    12/26/22 1058 12/26/22 1100 12/26/22 1110 12/26/22 1110   BP: (!) 122/94 (!) 122/94     Pulse: (!) 105 98 87    Resp: 18 24 13    Temp: 97.1 °F (36.2 °C)      SpO2: 97% 90% 97% 97%   Weight: 97.5 kg (215 lb)      Height: 6' 1\" (1.854 m)        Physical Exam    Nursing notes and vital signs reviewed    Constitutional: Non toxic appearing, moderate distress  Head: Normocephalic, Atraumatic  Eyes: EOMI  Neck: Supple  Cardiovascular: Tachycardic and regular rhythm, no murmurs, rubs, or gallops  Chest: Increased work of breathing and equal chest excursion bilaterally, tachypneic  Lungs: Diminished to ausculation bilaterally  Abdomen: Soft, non tender, non distended  Back: No evidence of trauma or deformity  Extremities: No evidence of trauma or deformity, mild bilateral symmetric LE edema  Skin: Warm and dry, normal cap refill  Neuro: Alert and appropriate  Psychiatric: Normal mood and affect      Diagnostic Study Results     Labs -     Recent Results (from the past 12 hour(s))   EKG, 12 LEAD, INITIAL    Collection Time: 12/26/22 10:51 AM   Result Value Ref Range    Ventricular Rate 106 BPM    Atrial Rate 106 BPM    P-R Interval 158 ms    QRS Duration 92 ms    Q-T Interval 384 ms    QTC Calculation (Bezet) 510 ms    Calculated P Axis 53 degrees    Calculated R Axis 48 degrees    Calculated T Axis 53 degrees    Diagnosis       Sinus tachycardia with occasional premature ventricular complexes  Possible Left atrial enlargement  Minimal voltage criteria for LVH, may be normal variant ( [place] product )  Septal infarct , age undetermined  Abnormal ECG  When compared with ECG of 27-NOV-2022 14:56,  premature ventricular complexes are now present     CBC WITH AUTOMATED DIFF    Collection Time: 12/26/22 11:00 AM   Result Value Ref Range    WBC 6.3 4.6 - 13.2 K/uL    RBC 4.56 4.35 - 5.65 M/uL    HGB 13.0 13.0 - 16.0 g/dL    HCT 39.8 36.0 - 48.0 %    MCV 87.3 78.0 - 100.0 FL    MCH 28.5 24.0 - 34.0 PG    MCHC 32.7 31.0 - 37.0 g/dL    RDW 14.5 11.6 - 14.5 %    PLATELET 923 006 - 948 K/uL    MPV 11.2 9.2 - 11.8 FL    NRBC 0.0 0  WBC    ABSOLUTE NRBC 0.00 0.00 - 0.01 K/uL    NEUTROPHILS 55 40 - 73 %    LYMPHOCYTES 30 21 - 52 %    MONOCYTES 10 3 - 10 %    EOSINOPHILS 4 0 - 5 %    BASOPHILS 1 0 - 2 %    IMMATURE GRANULOCYTES 0 0.0 - 0.5 %    ABS. NEUTROPHILS 3.5 1.8 - 8.0 K/UL    ABS. LYMPHOCYTES 1.9 0.9 - 3.6 K/UL    ABS. MONOCYTES 0.6 0.05 - 1.2 K/UL    ABS. EOSINOPHILS 0.2 0.0 - 0.4 K/UL    ABS. BASOPHILS 0.0 0.0 - 0.1 K/UL    ABS. IMM. GRANS. 0.0 0.00 - 0.04 K/UL    DF AUTOMATED     PROTHROMBIN TIME + INR    Collection Time: 12/26/22 11:00 AM   Result Value Ref Range    Prothrombin time 15.2 11.5 - 15.2 sec    INR 1.2 0.8 - 1.2     METABOLIC PANEL, COMPREHENSIVE    Collection Time: 12/26/22 11:00 AM   Result Value Ref Range    Sodium 141 136 - 145 mmol/L    Potassium 3.7 3.5 - 5.5 mmol/L    Chloride 110 100 - 111 mmol/L    CO2 27 21 - 32 mmol/L    Anion gap 4 3.0 - 18 mmol/L    Glucose 122 (H) 74 - 99 mg/dL    BUN 15 7.0 - 18 MG/DL    Creatinine 0.98 0.6 - 1.3 MG/DL    BUN/Creatinine ratio 15 12 - 20      eGFR >60 >60 ml/min/1.73m2    Calcium 9.5 8.5 - 10.1 MG/DL    Bilirubin, total 0.6 0.2 - 1.0 MG/DL    ALT (SGPT) 100 (H) 16 - 61 U/L    AST (SGOT) 75 (H) 10 - 38 U/L    Alk.  phosphatase 139 (H) 45 - 117 U/L    Protein, total 6.5 6.4 - 8.2 g/dL    Albumin 3.4 3.4 - 5.0 g/dL    Globulin 3.1 2.0 - 4.0 g/dL    A-G Ratio 1.1 0.8 - 1.7     NT-PRO BNP    Collection Time: 12/26/22 11:00 AM   Result Value Ref Range    NT pro-BNP 5,582 (H) 0 - 900 PG/ML   TROPONIN-HIGH SENSITIVITY    Collection Time: 12/26/22 11:00 AM   Result Value Ref Range    Troponin-High Sensitivity 116 (H) 0 - 78 ng/L   EKG, 12 LEAD, SUBSEQUENT    Collection Time: 12/26/22 11:46 AM   Result Value Ref Range    Ventricular Rate 102 BPM    Atrial Rate 102 BPM    P-R Interval 152 ms    QRS Duration 96 ms    Q-T Interval 388 ms    QTC Calculation (Bezet) 505 ms    Calculated P Axis 51 degrees    Calculated R Axis 43 degrees    Calculated T Axis 67 degrees    Diagnosis       Sinus tachycardia with occasional premature ventricular complexes  Possible Left atrial enlargement  Minimal voltage criteria for LVH, may be normal variant ( [place] product )  Septal infarct (cited on or before 26-DEC-2022)  Abnormal ECG  When compared with ECG of 26-DEC-2022 10:51,  No significant change was found     TROPONIN-HIGH SENSITIVITY    Collection Time: 12/26/22 11:50 AM   Result Value Ref Range    Troponin-High Sensitivity 114 (H) 0 - 78 ng/L       Radiologic Studies -   XR CHEST PORT   Final Result      Findings are suggestive of mild interstitial pulmonary edema. CT Results  (Last 48 hours)      None          CXR Results  (Last 48 hours)                 12/26/22 1117  XR CHEST PORT Final result    Impression:      Findings are suggestive of mild interstitial pulmonary edema. Narrative:  EXAM: FRONTAL CHEST RADIOGRAPH       CLINICAL INDICATION/HISTORY: Chest pain       COMPARISON: 11/27/2022       TECHNIQUE: Frontal view of the chest       _______________       FINDINGS:       HEART AND MEDIASTINUM: Stable mildly enlarged cardiac silhouette. LUNGS AND PLEURAL SPACES: No consolidation. Mildly increased haziness and   reticulation. Pleural spaces appear clear. BONY THORAX AND SOFT TISSUES: Unremarkable.       _______________                   Medications given in the ED-  Medications   furosemide (LASIX) injection 40 mg (40 mg IntraVENous Given 12/26/22 1147)         Medical Decision Making   I am the first provider for this patient.     I reviewed the vital signs, available nursing notes, past medical history, past surgical history, family history and social history. Vital Signs-Reviewed the patient's vital signs. Pulse Oximetry Analysis - 97% on room air, not hypoxic     Cardiac Monitor:  Rate: 109 bpm  Rhythm: Sinus tachycardia    EKG interpretation: (Preliminary)  EKG read by Dr. Nghia Dan at 10:59 AM  Sinus tachycardia at a rate of 106 bpm, NE interval 158 ms, QRS duration of 92 ms, similar when compared to prior from November 2022    Repeat EKG interpretation: (Preliminary)  EKG read by Dr. Nghia Dan at 11:53 AM  Sinus tachycardia at a rate of 102 bpm, NE interval 152 ms, QRS duration 96 ms, similar compared to prior from earlier today    Records Reviewed: Nursing Notes, Old Medical Records, and Previous electrocardiograms    Provider Notes (Medical Decision Making): Hayes Brown is a 45 y.o. male presenting with progressive shortness of breath and chest pressure over the past 1 to 2 months. Patient hemodynamically stable but does have evidence of volume overload on chest x-ray and labs. Increased his Lasix with good diuresis here. Discussed with cardiology as patient does have slightly elevated troponin but it is downtrending here in the ED. Patient had echo and heart cath within the last month. Plan for discharge with increased p.o. Lasix at home, early primary care and cardiology follow-up, strict return precautions. Patient understands and agrees with this plan. Procedures:  Procedures    ED Course:   CONSULT NOTE:   12:07 PM  Dr. Nghia Dan spoke with Dr. Que Bai   Specialty: Cardiology  Discussed pt's hx, disposition, and available diagnostic and imaging results over the telephone. Reviewed care plans. Had cardiac cath last month and echo, trend trop and EKG, IV lasix. 12:56 PM  Updated patient on all results and plan. All questions answered. He reports he feels much better and has been diuresing well to the IV Lasix.   He reports has been taking his Lasix as prescribed at home, 20 mg twice daily. Diagnosis and Disposition     Critical Care: None    DISCHARGE NOTE:    Siria Job  results have been reviewed with him. He has been counseled regarding his diagnosis, treatment, and plan. He verbally conveys understanding and agreement of the signs, symptoms, diagnosis, treatment and prognosis and additionally agrees to follow up as discussed. He also agrees with the care-plan and conveys that all of his questions have been answered. I have also provided discharge instructions for him that include: educational information regarding their diagnosis and treatment, and list of reasons why they would want to return to the ED prior to their follow-up appointment, should his condition change. He has been provided with education for proper emergency department utilization. CLINICAL IMPRESSION:    1. Acute on chronic systolic congestive heart failure ([facility]        PLAN:  1. D/C Home  2. Current Discharge Medication List        START taking these medications    Details   furosemide (Lasix) 40 mg tablet Take 1 Tablet by mouth two (2) times a day for 7 days. Take Lasix 40 mg PO twice daily for 7 days and then resume your normal 20 mg PO twice daily thereafter. Qty: 14 Tablet, Refills: 0  Start date: 12/26/2022, End date: 1/2/2023           3.    Follow-up Information       Follow up With Specialties Details Why Contact Info    [facility]  Schedule an appointment as soon as possible for a visit   [address] [facility], [address] [facility] [address] [facility]    Carmen Brewster MD Cardiovascular Disease Physician, Internal Medicine Physician Schedule an appointment as soon as possible for a visit   [address]  [address] [address]  [phone]      [facility] EMERGENCY DEPT Emergency Medicine  If symptoms worsen [address] [id number]  [phone]          _______________________________      Please note that this dictation was completed with Dragon, the computer voice recognition software. Quite often unanticipated grammatical, syntax, homophones, and other interpretive errors are inadvertently transcribed by the computer software. Please disregard these errors. Please excuse any errors that have escaped final proofreading.

## 2022-12-26 NOTE — PROGRESS NOTES
Case discussed with Dr. Anna Thomas.  Patient underwent recent right and left heart catheterization his cardiovascular status has been defined. At this point main issue is compliance to medication diet, fluid and follow-up with primary care physician and cardiologist in outpatient setting. If repeat troponin and EKG are stable patient can be discharged and should have a follow-up in outpatient clinic.   Thanks

## 2023-01-16 ENCOUNTER — APPOINTMENT (OUTPATIENT)
Dept: GENERAL RADIOLOGY | Age: 53
End: 2023-01-16
Attending: EMERGENCY MEDICINE
Payer: MEDICAID

## 2023-01-16 ENCOUNTER — HOSPITAL ENCOUNTER (EMERGENCY)
Age: 53
Discharge: HOME | End: 2023-01-16
Attending: EMERGENCY MEDICINE
Payer: MEDICAID

## 2023-01-16 VITALS
HEIGHT: 73 IN | HEART RATE: 89 BPM | BODY MASS INDEX: 27.7 KG/M2 | SYSTOLIC BLOOD PRESSURE: 121 MMHG | OXYGEN SATURATION: 98 % | WEIGHT: 209 LBS | RESPIRATION RATE: 18 BRPM | DIASTOLIC BLOOD PRESSURE: 82 MMHG | TEMPERATURE: 97.4 F

## 2023-01-16 DIAGNOSIS — I50.9 CONGESTIVE HEART FAILURE, UNSPECIFIED HF CHRONICITY, UNSPECIFIED HEART FAILURE TYPE (HCC): Primary | ICD-10-CM

## 2023-01-16 LAB
ALBUMIN SERPL-MCNC: 3.4 G/DL (ref 3.4–5)
ALBUMIN/GLOB SERPL: 0.9 (ref 0.8–1.7)
ALP SERPL-CCNC: 126 U/L (ref 45–117)
ALT SERPL-CCNC: 112 U/L (ref 16–61)
ANION GAP SERPL CALC-SCNC: 7 MMOL/L (ref 3–18)
AST SERPL-CCNC: 112 U/L (ref 10–38)
ATRIAL RATE: 113 BPM
BASOPHILS # BLD: 0 K/UL (ref 0–0.1)
BASOPHILS NFR BLD: 0 % (ref 0–2)
BILIRUB SERPL-MCNC: 0.9 MG/DL (ref 0.2–1)
BNP SERPL-MCNC: 9279 PG/ML (ref 0–900)
BUN SERPL-MCNC: 18 MG/DL (ref 7–18)
BUN/CREAT SERPL: 18 (ref 12–20)
CALCIUM SERPL-MCNC: 10.2 MG/DL (ref 8.5–10.1)
CALCULATED P AXIS, ECG09: 65 DEGREES
CALCULATED R AXIS, ECG10: 99 DEGREES
CALCULATED T AXIS, ECG11: 5 DEGREES
CHLORIDE SERPL-SCNC: 105 MMOL/L (ref 100–111)
CO2 SERPL-SCNC: 27 MMOL/L (ref 21–32)
CREAT SERPL-MCNC: 1.01 MG/DL (ref 0.6–1.3)
DIAGNOSIS, 93000: NORMAL
DIFFERENTIAL METHOD BLD: ABNORMAL
EOSINOPHIL # BLD: 0.1 K/UL (ref 0–0.4)
EOSINOPHIL NFR BLD: 1 % (ref 0–5)
ERYTHROCYTE [DISTWIDTH] IN BLOOD BY AUTOMATED COUNT: 13.9 % (ref 11.6–14.5)
GLOBULIN SER CALC-MCNC: 3.7 G/DL (ref 2–4)
GLUCOSE SERPL-MCNC: 136 MG/DL (ref 74–99)
HCT VFR BLD AUTO: 44.4 % (ref 36–48)
HGB BLD-MCNC: 14.6 G/DL (ref 13–16)
IMM GRANULOCYTES # BLD AUTO: 0 K/UL (ref 0–0.04)
IMM GRANULOCYTES NFR BLD AUTO: 0 % (ref 0–0.5)
LYMPHOCYTES # BLD: 1.7 K/UL (ref 0.9–3.6)
LYMPHOCYTES NFR BLD: 23 % (ref 21–52)
MCH RBC QN AUTO: 27.9 PG (ref 24–34)
MCHC RBC AUTO-ENTMCNC: 32.9 G/DL (ref 31–37)
MCV RBC AUTO: 84.9 FL (ref 78–100)
MONOCYTES # BLD: 0.9 K/UL (ref 0.05–1.2)
MONOCYTES NFR BLD: 12 % (ref 3–10)
NEUTS SEG # BLD: 4.6 K/UL (ref 1.8–8)
NEUTS SEG NFR BLD: 64 % (ref 40–73)
NRBC # BLD: 0 K/UL (ref 0–0.01)
NRBC BLD-RTO: 0 PER 100 WBC
P-R INTERVAL, ECG05: 140 MS
PLATELET # BLD AUTO: 273 K/UL (ref 135–420)
PMV BLD AUTO: 11.8 FL (ref 9.2–11.8)
POTASSIUM SERPL-SCNC: 3.9 MMOL/L (ref 3.5–5.5)
PROT SERPL-MCNC: 7.1 G/DL (ref 6.4–8.2)
Q-T INTERVAL, ECG07: 338 MS
QRS DURATION, ECG06: 90 MS
QTC CALCULATION (BEZET), ECG08: 463 MS
RBC # BLD AUTO: 5.23 M/UL (ref 4.35–5.65)
SODIUM SERPL-SCNC: 139 MMOL/L (ref 136–145)
TROPONIN-HIGH SENSITIVITY: 43 NG/L (ref 0–78)
TROPONIN-HIGH SENSITIVITY: 46 NG/L (ref 0–78)
VENTRICULAR RATE, ECG03: 113 BPM
WBC # BLD AUTO: 7.2 K/UL (ref 4.6–13.2)

## 2023-01-16 PROCEDURE — 93005 ELECTROCARDIOGRAM TRACING: CPT

## 2023-01-16 PROCEDURE — 99285 EMERGENCY DEPT VISIT HI MDM: CPT

## 2023-01-16 PROCEDURE — 74011250636 HC RX REV CODE- 250/636: Performed by: EMERGENCY MEDICINE

## 2023-01-16 PROCEDURE — 85025 COMPLETE CBC W/AUTO DIFF WBC: CPT

## 2023-01-16 PROCEDURE — 84484 ASSAY OF TROPONIN QUANT: CPT

## 2023-01-16 PROCEDURE — 71046 X-RAY EXAM CHEST 2 VIEWS: CPT

## 2023-01-16 PROCEDURE — 96374 THER/PROPH/DIAG INJ IV PUSH: CPT

## 2023-01-16 PROCEDURE — 83880 ASSAY OF NATRIURETIC PEPTIDE: CPT

## 2023-01-16 PROCEDURE — 80053 COMPREHEN METABOLIC PANEL: CPT

## 2023-01-16 RX ORDER — FUROSEMIDE 10 MG/ML
40 INJECTION INTRAMUSCULAR; INTRAVENOUS
Status: COMPLETED | OUTPATIENT
Start: 2023-01-16 | End: 2023-01-16

## 2023-01-16 RX ORDER — FUROSEMIDE 20 MG/1
40 TABLET ORAL 2 TIMES DAILY
COMMUNITY
Start: 2022-12-16 | End: 2023-01-16

## 2023-01-16 RX ORDER — FUROSEMIDE 40 MG/1
40 TABLET ORAL DAILY
Qty: 60 TABLET | Refills: 0 | Status: SHIPPED | OUTPATIENT
Start: 2023-01-16 | End: 2023-03-17

## 2023-01-16 RX ADMIN — FUROSEMIDE 40 MG: 10 INJECTION, SOLUTION INTRAMUSCULAR; INTRAVENOUS at 03:52

## 2023-01-16 NOTE — ED NOTES
Pt AAO x4 denies pain and discomfort, nausea, vomiting, dizziness and diarrhea. Skin warm, dry intact and appropriate for race and ethnicity.  Resp even non-labored

## 2023-01-16 NOTE — ED PROVIDER NOTES
EMERGENCY DEPARTMENT HISTORY AND PHYSICAL EXAM      Date: 1/16/2023  Patient Name: Della Rios    History of Presenting Illness     Chief Complaint   Patient presents with    Shortness of Breath       History Provided By: Patient    HPI: Della Rios, 45 y.o. male with PMHx as noted below presents the emergency department chief complaint of shortness of breath. Patient reports some PND and exertional dyspnea that has been there for the last few months. Patient states he is being treated for congestive heart failure and had been prescribed Lasix however notes he ran out of this medication approximately 1 week ago which seems to have exacerbated his symptoms. Otherwise has been able to perform his ADLs. Denying any chest pain, cough or other infectious symptoms. Patient has not yet scheduled his cardiology follow-up after his last visit. Pt denies any other alleviating or exacerbating factors. Additionally, pt specifically denies any recent fever, chills, headache, nausea, vomiting, abdominal pain, CP, lightheadedness, dizziness, numbness, weakness, BLE swelling, heart palpitations, urinary sxs, diarrhea, constipation, melena, hematochezia, cough, or congestion. PCP: None    Current Outpatient Medications   Medication Sig Dispense Refill    furosemide (Lasix) 40 mg tablet Take 1 Tablet by mouth daily for 60 days. 60 Tablet 0    carvediloL (Coreg) 3.125 mg tablet Take 1 Tablet by mouth two (2) times daily (with meals). 61 Tablet 2       Past History     Past Medical History:  Past Medical History:   Diagnosis Date    Diabetes ([facility] [address]        Past Surgical History:  No past surgical history on file.     Family History:  Family History   Problem Relation Age of Onset    Cancer Mother     Alcohol abuse Father        Social History:  Social History     Tobacco Use    Smoking status: Every Day     Packs/day: 1.00     Types: Cigarettes    Smokeless tobacco: Never   Vaping Use    Vaping Use: Every day    Substances: Flavoring   Substance Use Topics    Alcohol use: Yes    Drug use: Not Currently       Allergies:  No Known Allergies      Review of Systems   Review of Systems  Constitutional: Negative for fever, chills, and fatigue. HENT: Negative for congestion, sore throat, rhinorrhea, sneezing and neck stiffness   Eyes: Negative for discharge and redness. Respiratory: Positive for  shortness of breath. Negative wheezing   Cardiovascular: Negative for chest pain, palpitations   Gastrointestinal: Negative for nausea, vomiting, abdominal pain, constipation, diarrhea and blood in stool. Genitourinary: Negative for dysuria, hematuria, flank pain, decreased urine volume, discharge,   Musculoskeletal: Negative for myalgias or joint pain . Skin: Negative for rash or lesions . Neurological: Negative weakness, light-headedness, numbness and headaches. Physical Exam   Physical Exam    GENERAL: alert and oriented, no acute distress  EYES: PEERL, No injection, discharge or icterus. ENT: Mucous membranes pink and moist.  NECK: Supple  LUNGS: Airway patent. Non-labored respirations. Crackles noted in the bases  HEART: Regular rate and rhythm. No peripheral edema  ABDOMEN: Non-distended and non-tender, without guarding or rebound.   SKIN:  warm, dry  MSK/EXTREMITIES: Without swelling, tenderness or deformity, symmetric with normal ROM  NEUROLOGICAL: Alert, oriented      Diagnostic Study Results     Labs -     Recent Results (from the past 12 hour(s))   EKG, 12 LEAD, INITIAL    Collection Time: 01/16/23  1:41 AM   Result Value Ref Range    Ventricular Rate 113 BPM    Atrial Rate 113 BPM    P-R Interval 140 ms    QRS Duration 90 ms    Q-T Interval 338 ms    QTC Calculation (Bezet) 463 ms    Calculated P Axis 65 degrees    Calculated R Axis 99 degrees    Calculated T Axis 5 degrees    Diagnosis       Sinus tachycardia  Possible Left atrial enlargement  Rightward axis  Biventricular hypertrophy  Septal infarct (cited on or before 26-DEC-2022)  Abnormal ECG  When compared with ECG of 26-DEC-2022 11:46,  premature ventricular complexes are no longer present  Questionable change in QRS axis     CBC WITH AUTOMATED DIFF    Collection Time: 01/16/23  1:55 AM   Result Value Ref Range    WBC 7.2 4.6 - 13.2 K/uL    RBC 5.23 4.35 - 5.65 M/uL    HGB 14.6 13.0 - 16.0 g/dL    HCT 44.4 36.0 - 48.0 %    MCV 84.9 78.0 - 100.0 FL    MCH 27.9 24.0 - 34.0 PG    MCHC 32.9 31.0 - 37.0 g/dL    RDW 13.9 11.6 - 14.5 %    PLATELET 762 018 - 107 K/uL    MPV 11.8 9.2 - 11.8 FL    NRBC 0.0 0  WBC    ABSOLUTE NRBC 0.00 0.00 - 0.01 K/uL    NEUTROPHILS 64 40 - 73 %    LYMPHOCYTES 23 21 - 52 %    MONOCYTES 12 (H) 3 - 10 %    EOSINOPHILS 1 0 - 5 %    BASOPHILS 0 0 - 2 %    IMMATURE GRANULOCYTES 0 0.0 - 0.5 %    ABS. NEUTROPHILS 4.6 1.8 - 8.0 K/UL    ABS. LYMPHOCYTES 1.7 0.9 - 3.6 K/UL    ABS. MONOCYTES 0.9 0.05 - 1.2 K/UL    ABS. EOSINOPHILS 0.1 0.0 - 0.4 K/UL    ABS. BASOPHILS 0.0 0.0 - 0.1 K/UL    ABS. IMM. GRANS. 0.0 0.00 - 0.04 K/UL    DF AUTOMATED     NT-PRO BNP    Collection Time: 01/16/23  1:55 AM   Result Value Ref Range    NT pro-BNP 9,279 (H) 0 - 900 PG/ML   TROPONIN-HIGH SENSITIVITY    Collection Time: 01/16/23  1:55 AM   Result Value Ref Range    Troponin-High Sensitivity 43 0 - 78 ng/L   METABOLIC PANEL, COMPREHENSIVE    Collection Time: 01/16/23  1:55 AM   Result Value Ref Range    Sodium 139 136 - 145 mmol/L    Potassium 3.9 3.5 - 5.5 mmol/L    Chloride 105 100 - 111 mmol/L    CO2 27 21 - 32 mmol/L    Anion gap 7 3.0 - 18 mmol/L    Glucose 136 (H) 74 - 99 mg/dL    BUN 18 7.0 - 18 MG/DL    Creatinine 1.01 0.6 - 1.3 MG/DL    BUN/Creatinine ratio 18 12 - 20      eGFR >60 >60 ml/min/1.73m2    Calcium 10.2 (H) 8.5 - 10.1 MG/DL    Bilirubin, total 0.9 0.2 - 1.0 MG/DL    ALT (SGPT) 112 (H) 16 - 61 U/L    AST (SGOT) 112 (H) 10 - 38 U/L    Alk.  phosphatase 126 (H) 45 - 117 U/L    Protein, total 7.1 6.4 - 8.2 g/dL    Albumin 3.4 3.4 - 5.0 g/dL    Globulin 3.7 2.0 - 4.0 g/dL    A-G Ratio 0.9 0.8 - 1.7     TROPONIN-HIGH SENSITIVITY    Collection Time: 01/16/23  3:51 AM   Result Value Ref Range    Troponin-High Sensitivity 46 0 - 78 ng/L       Radiologic Studies -   XR CHEST PA LAT   Final Result      Pulmonary vascular congestion without convincing edema. CT Results  (Last 48 hours)      None          CXR Results  (Last 48 hours)                 01/16/23 0230  XR CHEST PA LAT Final result    Impression:      Pulmonary vascular congestion without convincing edema. Narrative:  EXAM: PA AND LATERAL CHEST RADIOGRAPH       CLINICAL INDICATION/HISTORY: Chest pain       COMPARISON: 12/26/2022       TECHNIQUE: PA and lateral views of the chest       _______________       FINDINGS:       HEART AND MEDIASTINUM: Stable cardiomediastinal silhouette. LUNGS AND PLEURAL SPACES: Pulmonary vascular congestion without definitive   edema. No focal opacities. Pleural spaces are clear. BONY THORAX AND SOFT TISSUES: Unremarkable.       _______________                     Medical Decision Making       I reviewed the vital signs, available nursing notes, past medical history, past surgical history, family history and social history. Vital Signs-Reviewed the patient's vital signs. Patient Vitals for the past 12 hrs:   Temp Pulse Resp BP SpO2   01/16/23 0459 97.4 °F (36.3 °C) 89 18 121/82 98 %   01/16/23 0356 -- 68 26 100/63 91 %   01/16/23 0142 -- -- -- 109/76 --   01/16/23 0140 97.4 °F (36.3 °C) 90 17 -- 96 %         Chronic Conditions Contributing to today's visit: CHF    Cardiac Monitor:   Rate: 90 bpm  Rhythm: Normal Sinus Rhythm    The cardiac monitor was ordered secondary to SOB  and to monitor the patient for dysrhythmia      EKG interpretation: (Preliminary)  Rhythm: Sinus tachycardia.  Rate (approx.): 113; Axis: normal; P wave: normal; QRS interval: normal ; ST/T wave: normal; no significant changes when compared to previous was interpreted by Jaleesa Davey Devon Wright MD,ED Provider. Records Reviewed (source and summary of external notes): Old Medical Records    Provider Notes (Medical Decision Making): On presentation, the patient is well appearing, in no acute distress with normal vital signs, patient was never tachycardic on my assessments. Based on my history and exam the differential diagnosis for this patient includes CHF, ACS, PE, dissection, pneumonia, COVID, anemia, metabolic abnormality. X-ray showed some vascular congestion on my interpretation. Labs were notable for elevated BNP consistent with patient's known history of CHF. Troponin was negative on delta testing. Patient was given 40 mg of IV Lasix in the ED with significant diuresis. On reevaluation he is noted to be feeling better. He has no visible dyspnea on ambulation and felt appropriate for outpatient management. Patient agrees to call within the next 48 hours to follow-up with cardiology as had been previously recommended. Otherwise return to the ED if his symptoms worsen. ED Course:   Initial assessment performed. The patients presenting problems have been discussed, and they are in agreement with the care plan formulated and outlined with them. I have encouraged them to ask questions as they arise throughout their visit. Patient was given the following medications:  Medications   furosemide (LASIX) injection 40 mg (40 mg IntraVENous Given 1/16/23 0352)            Disposition Considerations (Tests not done, Shared Decision Making, Pt Expectation of Test or Tx.): Considered admission however patient's not hypoxic, able to ambulate without any significant symptoms and troponin testing was negative so felt appropriate for outpatient follow-up    PROGRESS  Fina Jones  results have been reviewed with him. He has been counseled regarding his diagnosis.   He verbally conveys understanding and agreement of the signs, symptoms, diagnosis, treatment and prognosis and additionally agrees to follow up as recommended. He also agrees with the care-plan and conveys that all of his questions have been answered. I have also put together some discharge instructions for him that include: 1) educational information regarding their diagnosis, 2) how to care for their diagnosis at home, as well a 3) list of reasons why they would want to return to the ED prior to their follow-up appointment, should their condition change. Disposition:  home    PLAN:  1. Current Discharge Medication List        START taking these medications    Details   furosemide (Lasix) 40 mg tablet Take 1 Tablet by mouth daily for 60 days. Qty: 60 Tablet, Refills: 0  Start date: 1/16/2023, End date: 3/17/2023           2. Follow-up Information       Follow up With Specialties Details Why [address]    [facility] Emergency Medicine  If symptoms worsen [address] [place]  [phone]    Ying Blount MD Cardiovascular Disease Physician, Internal Medicine Physician Schedule an appointment as soon as possible for a visit   [address]  [address] [facility]  [phone]            Return to ED if worse     Diagnosis     Clinical Impression:   1. Congestive heart failure, unspecified HF chronicity, unspecified heart failure type (HCC)          Isis Harrison MD am the first provider for this patient and am the attending of record for this patient encounter. Valentina Deng MD        Please note that this dictation was completed with Dragon, computer voice recognition software. Quite often unanticipated grammatical, syntax, homophones, and other interpretive errors are inadvertently transcribed by the computer software. Please disregard these errors. Additionally, please excuse any errors that have escaped final proofreading.

## 2023-01-16 NOTE — ED TRIAGE NOTES
Pt presents to ED with cc of SOB and midsternal CP that radiate down right arm. Pt states he was started on a diuretic a couple months ago. Pt states the SOB is worse with exertion and lying down.

## 2023-02-28 ENCOUNTER — APPOINTMENT (OUTPATIENT)
Facility: HOSPITAL | Age: 53
DRG: 194 | End: 2023-02-28
Payer: MEDICAID

## 2023-02-28 ENCOUNTER — HOSPITAL ENCOUNTER (INPATIENT)
Facility: HOSPITAL | Age: 53
LOS: 5 days | Discharge: HOME | DRG: 194 | End: 2023-03-05
Attending: EMERGENCY MEDICINE | Admitting: HOSPITALIST
Payer: MEDICAID

## 2023-02-28 DIAGNOSIS — I50.9 ACUTE ON CHRONIC CONGESTIVE HEART FAILURE, UNSPECIFIED HEART FAILURE TYPE (HCC): ICD-10-CM

## 2023-02-28 DIAGNOSIS — R07.89 OTHER CHEST PAIN: Primary | ICD-10-CM

## 2023-02-28 DIAGNOSIS — R07.89 MID STERNAL CHEST PAIN: ICD-10-CM

## 2023-02-28 DIAGNOSIS — J81.0 ACUTE PULMONARY EDEMA (HCC): ICD-10-CM

## 2023-02-28 DIAGNOSIS — I49.8 OTHER CARDIAC ARRHYTHMIA: ICD-10-CM

## 2023-02-28 DIAGNOSIS — I50.23 ACUTE ON CHRONIC SYSTOLIC (CONGESTIVE) HEART FAILURE (HCC): ICD-10-CM

## 2023-02-28 PROBLEM — I34.0 NONRHEUMATIC MITRAL VALVE REGURGITATION: Status: ACTIVE | Noted: 2022-11-27

## 2023-02-28 PROBLEM — R91.8 LUNG INFILTRATE: Status: ACTIVE | Noted: 2023-02-28

## 2023-02-28 PROBLEM — I49.9 ARRHYTHMIA: Status: ACTIVE | Noted: 2023-02-28

## 2023-02-28 PROBLEM — I10 HTN (HYPERTENSION): Status: ACTIVE | Noted: 2023-02-28

## 2023-02-28 PROBLEM — E11.9 DM (DIABETES MELLITUS) (HCC): Status: ACTIVE | Noted: 2022-11-28

## 2023-02-28 PROBLEM — F17.200 SMOKER: Status: ACTIVE | Noted: 2023-02-28

## 2023-02-28 PROBLEM — I42.9 CARDIOMYOPATHY (HCC): Status: ACTIVE | Noted: 2022-11-27

## 2023-02-28 LAB
ALBUMIN SERPL-MCNC: 3.6 G/DL (ref 3.4–5)
ALBUMIN/GLOB SERPL: 1.1 (ref 0.8–1.7)
ALP SERPL-CCNC: 133 U/L (ref 45–117)
ALT SERPL-CCNC: 55 U/L (ref 16–61)
AMMONIA PLAS-SCNC: 59 UMOL/L (ref 11–32)
AMPHET UR QL SCN: NEGATIVE
ANION GAP SERPL CALC-SCNC: 2 MMOL/L (ref 3–18)
ARTERIAL PATENCY WRIST A: POSITIVE
AST SERPL-CCNC: 63 U/L (ref 10–38)
BARBITURATES UR QL SCN: NEGATIVE
BASE EXCESS BLD CALC-SCNC: 0.8 MMOL/L
BASOPHILS # BLD: 0 K/UL (ref 0–0.1)
BASOPHILS NFR BLD: 1 % (ref 0–2)
BDY SITE: ABNORMAL
BENZODIAZ UR QL: NEGATIVE
BILIRUB SERPL-MCNC: 1 MG/DL (ref 0.2–1)
BUN SERPL-MCNC: 16 MG/DL (ref 7–18)
BUN/CREAT SERPL: 16 (ref 12–20)
CALCIUM SERPL-MCNC: 10.6 MG/DL (ref 8.5–10.1)
CANNABINOIDS UR QL SCN: NEGATIVE
CHLORIDE SERPL-SCNC: 110 MMOL/L (ref 100–111)
CO2 SERPL-SCNC: 28 MMOL/L (ref 21–32)
COCAINE UR QL SCN: NEGATIVE
CREAT SERPL-MCNC: 1.01 MG/DL (ref 0.6–1.3)
D DIMER PPP FEU-MCNC: 0.43 UG/ML(FEU)
DIFFERENTIAL METHOD BLD: ABNORMAL
ECHO AO ROOT DIAM: 3.3 CM
ECHO AO ROOT INDEX: 1.5 CM/M2
ECHO AV AREA PEAK VELOCITY: 3.2 CM2
ECHO AV AREA VTI: 3.2 CM2
ECHO AV AREA/BSA PEAK VELOCITY: 1.5 CM2/M2
ECHO AV AREA/BSA VTI: 1.5 CM2/M2
ECHO AV MEAN GRADIENT: 2 MMHG
ECHO AV MEAN VELOCITY: 0.6 M/S
ECHO AV PEAK GRADIENT: 3 MMHG
ECHO AV PEAK VELOCITY: 0.8 M/S
ECHO AV VELOCITY RATIO: 0.63
ECHO AV VTI: 13.7 CM
ECHO BSA: 2.23 M2
ECHO EST RA PRESSURE: 15 MMHG
ECHO IVC PROX: 2.9 CM
ECHO LA DIAMETER INDEX: 2.5 CM/M2
ECHO LA DIAMETER: 5.5 CM
ECHO LA TO AORTIC ROOT RATIO: 1.67
ECHO LA VOL 2C: 114 ML (ref 18–58)
ECHO LA VOL 4C: 100 ML (ref 18–58)
ECHO LA VOL BP: 107 ML (ref 18–58)
ECHO LA VOL/BSA BIPLANE: 49 ML/M2 (ref 16–34)
ECHO LA VOLUME AREA LENGTH: 112 ML
ECHO LA VOLUME INDEX A2C: 52 ML/M2 (ref 16–34)
ECHO LA VOLUME INDEX A4C: 45 ML/M2 (ref 16–34)
ECHO LA VOLUME INDEX AREA LENGTH: 51 ML/M2 (ref 16–34)
ECHO LV E' LATERAL VELOCITY: 6 CM/S
ECHO LV E' SEPTAL VELOCITY: 6 CM/S
ECHO LV EDV A2C: 234 ML
ECHO LV EDV A4C: 207 ML
ECHO LV EDV BP: 220 ML (ref 67–155)
ECHO LV EDV INDEX A4C: 94 ML/M2
ECHO LV EDV INDEX BP: 100 ML/M2
ECHO LV EDV NDEX A2C: 106 ML/M2
ECHO LV EJECTION FRACTION A2C: 41 %
ECHO LV EJECTION FRACTION A4C: 34 %
ECHO LV EJECTION FRACTION BIPLANE: 38 % (ref 55–100)
ECHO LV ESV A2C: 138 ML
ECHO LV ESV A4C: 136 ML
ECHO LV ESV BP: 137 ML (ref 22–58)
ECHO LV ESV INDEX A2C: 63 ML/M2
ECHO LV ESV INDEX A4C: 62 ML/M2
ECHO LV ESV INDEX BP: 62 ML/M2
ECHO LV FRACTIONAL SHORTENING: 14 % (ref 28–44)
ECHO LV INTERNAL DIMENSION DIASTOLE INDEX: 3.27 CM/M2
ECHO LV INTERNAL DIMENSION DIASTOLIC: 7.2 CM (ref 4.2–5.9)
ECHO LV INTERNAL DIMENSION SYSTOLIC INDEX: 2.82 CM/M2
ECHO LV INTERNAL DIMENSION SYSTOLIC: 6.2 CM
ECHO LV IVSD: 0.8 CM (ref 0.6–1)
ECHO LV MASS 2D: 257 G (ref 88–224)
ECHO LV MASS INDEX 2D: 116.8 G/M2 (ref 49–115)
ECHO LV POSTERIOR WALL DIASTOLIC: 0.8 CM (ref 0.6–1)
ECHO LV RELATIVE WALL THICKNESS RATIO: 0.22
ECHO LVOT AREA: 5.7 CM2
ECHO LVOT AV VTI INDEX: 0.55
ECHO LVOT DIAM: 2.7 CM
ECHO LVOT MEAN GRADIENT: 0 MMHG
ECHO LVOT PEAK GRADIENT: 1 MMHG
ECHO LVOT PEAK VELOCITY: 0.5 M/S
ECHO LVOT STROKE VOLUME INDEX: 19.8 ML/M2
ECHO LVOT SV: 43.5 ML
ECHO LVOT VTI: 7.6 CM
ECHO PULMONARY ARTERY END DIASTOLIC PRESSURE: 7 MMHG
ECHO PV REGURGITANT MAX VELOCITY: 1.3 M/S
ECHO RA END SYSTOLIC VOLUME APICAL 4 CHAMBER INDEX BSA: 41 ML/M2
ECHO RA VOLUME: 91 ML
ECHO RIGHT VENTRICULAR SYSTOLIC PRESSURE (RVSP): 56 MMHG
ECHO RV FREE WALL PEAK S': 11 CM/S
ECHO RV INTERNAL DIMENSION: 4.1 CM
ECHO RV TAPSE: 1.5 CM (ref 1.7–?)
ECHO TV REGURGITANT MAX VELOCITY: 3.2 M/S
ECHO TV REGURGITANT PEAK GRADIENT: 41 MMHG
EKG ATRIAL RATE: 56 BPM
EKG ATRIAL RATE: 98 BPM
EKG DIAGNOSIS: NORMAL
EKG DIAGNOSIS: NORMAL
EKG P AXIS: 59 DEGREES
EKG P AXIS: 61 DEGREES
EKG P-R INTERVAL: 142 MS
EKG P-R INTERVAL: 146 MS
EKG Q-T INTERVAL: 380 MS
EKG Q-T INTERVAL: 430 MS
EKG QRS DURATION: 94 MS
EKG QRS DURATION: 96 MS
EKG QTC CALCULATION (BAZETT): 414 MS
EKG QTC CALCULATION (BAZETT): 485 MS
EKG R AXIS: 104 DEGREES
EKG R AXIS: 111 DEGREES
EKG T AXIS: 15 DEGREES
EKG T AXIS: 17 DEGREES
EKG VENTRICULAR RATE: 56 BPM
EKG VENTRICULAR RATE: 98 BPM
EOSINOPHIL # BLD: 0.1 K/UL (ref 0–0.4)
EOSINOPHIL NFR BLD: 2 % (ref 0–5)
ERYTHROCYTE [DISTWIDTH] IN BLOOD BY AUTOMATED COUNT: 14.7 % (ref 11.6–14.5)
EST. AVERAGE GLUCOSE BLD GHB EST-MCNC: 143 MG/DL
ETHANOL SERPL-MCNC: <3 MG/DL (ref 0–3)
FLUAV RNA SPEC QL NAA+PROBE: NOT DETECTED
FLUBV RNA SPEC QL NAA+PROBE: NOT DETECTED
GAS FLOW.O2 O2 DELIVERY SYS: ABNORMAL
GLOBULIN SER CALC-MCNC: 3.4 G/DL (ref 2–4)
GLUCOSE BLD STRIP.AUTO-MCNC: 116 MG/DL (ref 70–110)
GLUCOSE BLD STRIP.AUTO-MCNC: 120 MG/DL (ref 70–110)
GLUCOSE BLD STRIP.AUTO-MCNC: 158 MG/DL (ref 70–110)
GLUCOSE BLD STRIP.AUTO-MCNC: 164 MG/DL (ref 70–110)
GLUCOSE SERPL-MCNC: 137 MG/DL (ref 74–99)
HBA1C MFR BLD: 6.6 % (ref 4.2–5.6)
HCO3 BLD-SCNC: 25.5 MMOL/L (ref 22–26)
HCT VFR BLD AUTO: 42.4 % (ref 36–48)
HGB BLD-MCNC: 13.7 G/DL (ref 13–16)
IMM GRANULOCYTES # BLD AUTO: 0 K/UL (ref 0–0.04)
IMM GRANULOCYTES NFR BLD AUTO: 0 % (ref 0–0.5)
INR PPP: 1.2 (ref 0.8–1.2)
LV EF: 18 %
LVEF MODALITY: ABNORMAL
LYMPHOCYTES # BLD: 1.7 K/UL (ref 0.9–3.6)
LYMPHOCYTES NFR BLD: 29 % (ref 21–52)
Lab: NORMAL
MAGNESIUM SERPL-MCNC: 2.1 MG/DL (ref 1.6–2.6)
MCH RBC QN AUTO: 27.6 PG (ref 24–34)
MCHC RBC AUTO-ENTMCNC: 32.3 G/DL (ref 31–37)
MCV RBC AUTO: 85.3 FL (ref 78–100)
METHADONE UR QL: NEGATIVE
MONOCYTES # BLD: 0.6 K/UL (ref 0.05–1.2)
MONOCYTES NFR BLD: 11 % (ref 3–10)
NEUTS SEG # BLD: 3.5 K/UL (ref 1.8–8)
NEUTS SEG NFR BLD: 58 % (ref 40–73)
NRBC # BLD: 0 K/UL (ref 0–0.01)
NRBC BLD-RTO: 0 PER 100 WBC
NT PRO BNP: 4560 PG/ML (ref 0–900)
O2/TOTAL GAS SETTING VFR VENT: 2 %
OPIATES UR QL: NEGATIVE
PCO2 BLD: 40.4 MMHG (ref 35–45)
PCP UR QL: NEGATIVE
PH BLD: 7.41 (ref 7.35–7.45)
PLATELET # BLD AUTO: 213 K/UL (ref 135–420)
PMV BLD AUTO: 11.8 FL (ref 9.2–11.8)
PO2 BLD: 104 MMHG (ref 80–100)
POTASSIUM SERPL-SCNC: 4 MMOL/L (ref 3.5–5.5)
PROT SERPL-MCNC: 7 G/DL (ref 6.4–8.2)
PROTHROMBIN TIME: 15.7 SEC (ref 11.5–15.2)
RBC # BLD AUTO: 4.97 M/UL (ref 4.35–5.65)
SAO2 % BLD: 98 % (ref 92–97)
SARS-COV-2 RNA RESP QL NAA+PROBE: NOT DETECTED
SERVICE CMNT-IMP: ABNORMAL
SODIUM SERPL-SCNC: 140 MMOL/L (ref 136–145)
SPECIMEN TYPE: ABNORMAL
TROPONIN I SERPL HS-MCNC: 56 NG/L (ref 0–78)
TROPONIN I SERPL HS-MCNC: 58 NG/L (ref 0–78)
TROPONIN I SERPL HS-MCNC: 61 NG/L (ref 0–78)
WBC # BLD AUTO: 6 K/UL (ref 4.6–13.2)

## 2023-02-28 PROCEDURE — 36415 COLL VENOUS BLD VENIPUNCTURE: CPT

## 2023-02-28 PROCEDURE — 83735 ASSAY OF MAGNESIUM: CPT

## 2023-02-28 PROCEDURE — 2580000003 HC RX 258: Performed by: HOSPITALIST

## 2023-02-28 PROCEDURE — 82077 ASSAY SPEC XCP UR&BREATH IA: CPT

## 2023-02-28 PROCEDURE — 83036 HEMOGLOBIN GLYCOSYLATED A1C: CPT

## 2023-02-28 PROCEDURE — 83880 ASSAY OF NATRIURETIC PEPTIDE: CPT

## 2023-02-28 PROCEDURE — 85025 COMPLETE CBC W/AUTO DIFF WBC: CPT

## 2023-02-28 PROCEDURE — 6370000000 HC RX 637 (ALT 250 FOR IP): Performed by: HOSPITALIST

## 2023-02-28 PROCEDURE — 99285 EMERGENCY DEPT VISIT HI MDM: CPT

## 2023-02-28 PROCEDURE — 93005 ELECTROCARDIOGRAM TRACING: CPT | Performed by: EMERGENCY MEDICINE

## 2023-02-28 PROCEDURE — 85610 PROTHROMBIN TIME: CPT

## 2023-02-28 PROCEDURE — 82140 ASSAY OF AMMONIA: CPT

## 2023-02-28 PROCEDURE — 1100000003 HC PRIVATE W/ TELEMETRY

## 2023-02-28 PROCEDURE — 80307 DRUG TEST PRSMV CHEM ANLYZR: CPT

## 2023-02-28 PROCEDURE — 96374 THER/PROPH/DIAG INJ IV PUSH: CPT

## 2023-02-28 PROCEDURE — 80053 COMPREHEN METABOLIC PANEL: CPT

## 2023-02-28 PROCEDURE — 82962 GLUCOSE BLOOD TEST: CPT

## 2023-02-28 PROCEDURE — P9047 ALBUMIN (HUMAN), 25%, 50ML: HCPCS | Performed by: HOSPITALIST

## 2023-02-28 PROCEDURE — 2500000003 HC RX 250 WO HCPCS: Performed by: HOSPITALIST

## 2023-02-28 PROCEDURE — 36600 WITHDRAWAL OF ARTERIAL BLOOD: CPT

## 2023-02-28 PROCEDURE — 82803 BLOOD GASES ANY COMBINATION: CPT

## 2023-02-28 PROCEDURE — 85379 FIBRIN DEGRADATION QUANT: CPT

## 2023-02-28 PROCEDURE — 6360000002 HC RX W HCPCS: Performed by: EMERGENCY MEDICINE

## 2023-02-28 PROCEDURE — 6360000002 HC RX W HCPCS: Performed by: HOSPITALIST

## 2023-02-28 PROCEDURE — 71045 X-RAY EXAM CHEST 1 VIEW: CPT

## 2023-02-28 PROCEDURE — 87636 SARSCOV2 & INF A&B AMP PRB: CPT

## 2023-02-28 PROCEDURE — 84484 ASSAY OF TROPONIN QUANT: CPT

## 2023-02-28 PROCEDURE — 93306 TTE W/DOPPLER COMPLETE: CPT

## 2023-02-28 RX ORDER — ONDANSETRON 2 MG/ML
4 INJECTION INTRAMUSCULAR; INTRAVENOUS EVERY 6 HOURS PRN
Status: DISCONTINUED | OUTPATIENT
Start: 2023-02-28 | End: 2023-03-05 | Stop reason: HOSPADM

## 2023-02-28 RX ORDER — ACETAMINOPHEN 650 MG/1
650 SUPPOSITORY RECTAL EVERY 6 HOURS PRN
Status: DISCONTINUED | OUTPATIENT
Start: 2023-02-28 | End: 2023-03-05 | Stop reason: HOSPADM

## 2023-02-28 RX ORDER — LACTULOSE 10 G/15ML
20 SOLUTION ORAL 3 TIMES DAILY
Status: DISCONTINUED | OUTPATIENT
Start: 2023-02-28 | End: 2023-03-03

## 2023-02-28 RX ORDER — NICOTINE 21 MG/24HR
1 PATCH, TRANSDERMAL 24 HOURS TRANSDERMAL DAILY
Status: DISCONTINUED | OUTPATIENT
Start: 2023-02-28 | End: 2023-03-05 | Stop reason: HOSPADM

## 2023-02-28 RX ORDER — ALBUMIN (HUMAN) 12.5 G/50ML
25 SOLUTION INTRAVENOUS ONCE
Status: DISCONTINUED | OUTPATIENT
Start: 2023-02-28 | End: 2023-02-28

## 2023-02-28 RX ORDER — ACETAMINOPHEN 325 MG/1
650 TABLET ORAL EVERY 6 HOURS PRN
Status: DISCONTINUED | OUTPATIENT
Start: 2023-02-28 | End: 2023-03-05 | Stop reason: HOSPADM

## 2023-02-28 RX ORDER — FUROSEMIDE 10 MG/ML
40 INJECTION INTRAMUSCULAR; INTRAVENOUS
Status: COMPLETED | OUTPATIENT
Start: 2023-02-28 | End: 2023-02-28

## 2023-02-28 RX ORDER — SODIUM CHLORIDE 0.9 % (FLUSH) 0.9 %
5-40 SYRINGE (ML) INJECTION EVERY 12 HOURS SCHEDULED
Status: DISCONTINUED | OUTPATIENT
Start: 2023-02-28 | End: 2023-03-05 | Stop reason: HOSPADM

## 2023-02-28 RX ORDER — POLYETHYLENE GLYCOL 3350 17 G/17G
17 POWDER, FOR SOLUTION ORAL DAILY PRN
Status: DISCONTINUED | OUTPATIENT
Start: 2023-02-28 | End: 2023-03-05 | Stop reason: HOSPADM

## 2023-02-28 RX ORDER — ENOXAPARIN SODIUM 100 MG/ML
40 INJECTION SUBCUTANEOUS DAILY
Status: DISCONTINUED | OUTPATIENT
Start: 2023-02-28 | End: 2023-03-05 | Stop reason: HOSPADM

## 2023-02-28 RX ORDER — INSULIN LISPRO 100 [IU]/ML
0-4 INJECTION, SOLUTION INTRAVENOUS; SUBCUTANEOUS NIGHTLY
Status: DISCONTINUED | OUTPATIENT
Start: 2023-02-28 | End: 2023-03-05 | Stop reason: HOSPADM

## 2023-02-28 RX ORDER — SODIUM CHLORIDE 9 MG/ML
INJECTION, SOLUTION INTRAVENOUS PRN
Status: DISCONTINUED | OUTPATIENT
Start: 2023-02-28 | End: 2023-03-05 | Stop reason: HOSPADM

## 2023-02-28 RX ORDER — 0.9 % SODIUM CHLORIDE 0.9 %
500 INTRAVENOUS SOLUTION INTRAVENOUS ONCE
Status: COMPLETED | OUTPATIENT
Start: 2023-02-28 | End: 2023-02-28

## 2023-02-28 RX ORDER — DEXTROSE MONOHYDRATE 100 MG/ML
INJECTION, SOLUTION INTRAVENOUS CONTINUOUS PRN
Status: DISCONTINUED | OUTPATIENT
Start: 2023-02-28 | End: 2023-03-05 | Stop reason: HOSPADM

## 2023-02-28 RX ORDER — ONDANSETRON 4 MG/1
4 TABLET, ORALLY DISINTEGRATING ORAL EVERY 8 HOURS PRN
Status: DISCONTINUED | OUTPATIENT
Start: 2023-02-28 | End: 2023-03-05 | Stop reason: HOSPADM

## 2023-02-28 RX ORDER — FUROSEMIDE 40 MG/1
40 TABLET ORAL DAILY
COMMUNITY
Start: 2023-01-16 | End: 2023-03-17

## 2023-02-28 RX ORDER — FUROSEMIDE 10 MG/ML
40 INJECTION INTRAMUSCULAR; INTRAVENOUS 2 TIMES DAILY
Status: DISCONTINUED | OUTPATIENT
Start: 2023-02-28 | End: 2023-03-01

## 2023-02-28 RX ORDER — FUROSEMIDE 10 MG/ML
40 INJECTION INTRAMUSCULAR; INTRAVENOUS 2 TIMES DAILY
Status: DISCONTINUED | OUTPATIENT
Start: 2023-02-28 | End: 2023-02-28

## 2023-02-28 RX ORDER — ALBUMIN (HUMAN) 12.5 G/50ML
25 SOLUTION INTRAVENOUS ONCE
Status: COMPLETED | OUTPATIENT
Start: 2023-02-28 | End: 2023-02-28

## 2023-02-28 RX ORDER — POTASSIUM CHLORIDE 20 MEQ/1
20 TABLET, EXTENDED RELEASE ORAL 2 TIMES DAILY WITH MEALS
Status: ACTIVE | OUTPATIENT
Start: 2023-03-01 | End: 2023-03-03

## 2023-02-28 RX ORDER — SODIUM CHLORIDE 0.9 % (FLUSH) 0.9 %
5-40 SYRINGE (ML) INJECTION PRN
Status: DISCONTINUED | OUTPATIENT
Start: 2023-02-28 | End: 2023-03-05 | Stop reason: HOSPADM

## 2023-02-28 RX ORDER — CARVEDILOL 3.12 MG/1
3.12 TABLET ORAL 2 TIMES DAILY WITH MEALS
Status: DISCONTINUED | OUTPATIENT
Start: 2023-02-28 | End: 2023-03-02

## 2023-02-28 RX ORDER — INSULIN LISPRO 100 [IU]/ML
0-4 INJECTION, SOLUTION INTRAVENOUS; SUBCUTANEOUS
Status: DISCONTINUED | OUTPATIENT
Start: 2023-02-28 | End: 2023-03-05 | Stop reason: HOSPADM

## 2023-02-28 RX ADMIN — SODIUM CHLORIDE, PRESERVATIVE FREE 10 ML: 5 INJECTION INTRAVENOUS at 11:29

## 2023-02-28 RX ADMIN — LACTULOSE 20 G: 20 SOLUTION ORAL at 21:20

## 2023-02-28 RX ADMIN — CEFTRIAXONE 1000 MG: 1 INJECTION, POWDER, FOR SOLUTION INTRAMUSCULAR; INTRAVENOUS at 10:40

## 2023-02-28 RX ADMIN — ALBUMIN (HUMAN) 25 G: 0.25 INJECTION, SOLUTION INTRAVENOUS at 16:13

## 2023-02-28 RX ADMIN — CARVEDILOL 3.12 MG: 3.12 TABLET, FILM COATED ORAL at 10:40

## 2023-02-28 RX ADMIN — FUROSEMIDE 40 MG: 10 INJECTION, SOLUTION INTRAMUSCULAR; INTRAVENOUS at 07:52

## 2023-02-28 RX ADMIN — SODIUM CHLORIDE 500 ML: 9 INJECTION, SOLUTION INTRAVENOUS at 12:41

## 2023-02-28 RX ADMIN — DOXYCYCLINE 100 MG: 100 INJECTION, POWDER, LYOPHILIZED, FOR SOLUTION INTRAVENOUS at 11:28

## 2023-02-28 RX ADMIN — CARVEDILOL 3.12 MG: 3.12 TABLET, FILM COATED ORAL at 18:22

## 2023-02-28 RX ADMIN — ENOXAPARIN SODIUM 40 MG: 100 INJECTION SUBCUTANEOUS at 10:39

## 2023-02-28 RX ADMIN — SODIUM CHLORIDE, PRESERVATIVE FREE 10 ML: 5 INJECTION INTRAVENOUS at 21:22

## 2023-02-28 ASSESSMENT — PAIN SCALES - GENERAL
PAINLEVEL_OUTOF10: 0
PAINLEVEL_OUTOF10: 10

## 2023-02-28 ASSESSMENT — PAIN - FUNCTIONAL ASSESSMENT: PAIN_FUNCTIONAL_ASSESSMENT: 0-10

## 2023-02-28 NOTE — ED NOTES
TRANSFER - OUT REPORT:    Verbal report given to Aydee LI  on France Marie  being transferred to [facility]  for routine progression of patient care       Report consisted of patient's Situation, Background, Assessment and   Recommendations(SBAR). Information from the following report(s) ED SBAR, MAR, Recent Results and Cardiac Rhythm sinus Tachy with occassional PVC (LAST EKG) was reviewed with the receiving nurse. [place] Assessment: Presents to emergency department  because of falls (Syncope, seizure, or loss of consciousness): No, Age > 79: No, Altered Mental Status, Intoxication with alcohol or substance confusion (Disorientation, impaired judgment, poor safety awaremess, or inability to follow instructions): No, Impaired Mobility: Ambulates or transfers with assistive devices or assistance; Unable to ambulate or transer.: No, Nursing Judgement: No  Lines:   Peripheral IV 02/28/23 Right Forearm (Active)   Site Assessment Clean, dry & intact 02/28/23 0633   Line Status Flushed;Blood return noted 02/28/23 [address] pulled back 02/28/23 0633   Phlebitis Assessment No symptoms 02/28/23 0633   Infiltration Assessment 0 02/28/23 0633   Alcohol Cap Used No 02/28/23 0633   Dressing Status Clean, dry & intact 02/28/23 0633   Dressing Type Transparent 02/28/23 1916        Opportunity for questions and clarification was provided.       Patient transported with:  Registered Nurse           Leanne Miller RN  02/28/23 3514

## 2023-02-28 NOTE — H&P
History & Physical    Patient: Milton Lilly MRN: [id number]  CSN: [id number]    YOB: 1970  Age: 45 y.o. Sex: male      DOA: 2/28/2023  Primary Care Provider:  None None      Assessment/Plan     Active Hospital Problems    Diagnosis Date Noted    Arrhythmia [I49.9] 02/28/2023     Priority: Medium    Lung infiltrate [R91.8] 02/28/2023     Priority: Medium    Smoker [F17.200] 02/28/2023     Priority: Medium    HTN (hypertension) [I10] 02/28/2023     Priority: Medium    DM (diabetes mellitus) ([facility] [address] [E11.9] 11/28/2022    Acute on chronic systolic congestive heart failure ([facility] [address] [I50.23] 11/27/2022    Cardiomyopathy ([facility] [address] [I42.9] 11/27/2022    Nonrheumatic mitral valve regurgitation [I34.0] 11/27/2022         Admit to tele     Acute on chronic systolic heart failure  Lasix 40 twice daily, watch for electrolytes and renal function  Urine drug screen negative  Continue Coreg,   Repeat echo  Cardiac enzyme so far negative  Cardiac monitor  Cardiology has been consulted  Limit fluid 1.8 L and sodium 2 g     Arrhythmia  PVC from cardiac monitor, non- SVT 7 beats  Cardiac monitor optimize electrolytes  Echo, check TSH  Urine drug screen negative    Lung infiltration  Rocephin doxycycline, symptom treatment    Cardiomyopathy/MVR  Repeat echo    Smoker  Continue educate patient nicotine patch        DM type II , with complication,  Hold the home medication metformin, ssi, diabetic diet , hypoglycemia protocol      Htn coreg and lasix   Full code    Please note that this dictation was completed with [employer], the [employer] voice recognition software. Quite often unanticipated grammatical, syntax, homophones, and other interpretive errors are inadvertently transcribed by the computer software. Please disregard these errors.   Please excuse any errors that have escaped final proofreading    Estimate  length of stay : TBD     DVT : Lovenox ppi proph  CC: Shortness of breath       HPI:     Milton Lilly is a 45 y.o. male with history of  diabetes, hypertension, CHF, smoker presented to ER due to worsening shortness of breath for 2 to 3 days. He has been shortness of breath for several months. Worsening today. Denies any leg swelling. He was discharged last November, not seeing cardiologist.  He said he will have a cardiologist appointment tomorrow. Not sure whom  he will see. In the ER, he was found 7 beats of V. tach. Patient have no symptoms. Chest x-ray indicated pulmonary edema versus infection. He did reported some chills and subjective  fever at home. Urine drug screen negative, his BNP >4500. 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 Denies any chest pain, troponin x2 negative. /84   Pulse 62   Temp 97.6 °F (36.4 °C) (Oral)   Resp 24   Ht 6' (1.829 m)   Wt 215 lb (97.5 kg)   SpO2 94%   BMI 29.16 kg/m²           Past Medical History:   Diagnosis Date    Diabetes [facility])      Surgical History    Surgical History  Surgery Date Site/Laterality Comments   RHINOPLASTY          Family History   Problem Relation Age of Onset    Cancer Mother     Alcohol Abuse Father        Social History     Socioeconomic History    Marital status: Single   Tobacco Use    Smoking status: Every Day     Packs/day: 1.00     Types: Cigarettes    Smokeless tobacco: Never   Substance and Sexual Activity    Alcohol use: Yes    Drug use: Not Currently       Prior to Admission medications    Medication Sig Start Date End Date Taking? Authorizing Provider   carvedilol (COREG) 3.125 MG tablet Take 3.125 mg by mouth 2 times daily (with meals) 12/1/22   Ar Automatic Reconciliation       No Known Allergies    Review of Systems  Gen: + fever, +chills, +malaise, no weight loss/gain. Heent: No headache, rhinorrhea, epistaxis, ear pain, hearing loss, sinus pain, neck pain/stiffness, sore throat. Heart: No chest pain, palpitations, ELIZABETH, pnd, or orthopnea.    Resp: No cough, hemoptysis, wheezing and  +shortness of breath. GI: No nausea, vomiting, diarrhea, constipation, melena or hematochezia. : No urinary obstruction, dysuria or hematuria. Derm: No rash, new skin lesion or pruritis. Musc/skeletal: no bone or joint complains. Vasc: No edema, cyanosis or claudication. Endo: No heat/cold intolerance, no polyuria,polydipsia or polyphagia. Neuro: No unilateral weakness, numbness, tingling. No seizures. Heme: No easy bruising or bleeding. Physical Exam:     Physical Exam:  /84   Pulse 62   Temp 97.6 °F (36.4 °C) (Oral)   Resp 24   Ht 6' (1.829 m)   Wt 215 lb (97.5 kg)   SpO2 94%   BMI 29.16 kg/m²         Temp (24hrs), Av.6 °F (36.4 °C), Min:97.6 °F (36.4 °C), Max:97.6 °F (36.4 °C)     0701 -  1900  In: -   Out: 1672 [Urine:1160]   No intake/output data recorded. General:  Awake, cooperative, no distress. Head:  Normocephalic, without obvious abnormality, atraumatic. Eyes:  Conjunctivae/corneas clear, sclera anicteric, PERRL, EOMs intact. Nose: Nares normal. No drainage or sinus tenderness. Throat: Lips, mucosa, and tongue normal. .   Neck: Supple, symmetrical, trachea midline, no adenopathy. Lungs:   Clear to auscultation bilaterally. Heart:  Regular rate and rhythm, S1, S2 normal, + murmur,no  click, rub or gallop. Abdomen: Soft, non-tender. Bowel sounds normal. No masses,  No organomegaly. Extremities: Extremities normal, atraumatic, no cyanosis or edema. Pulses: 2+ and symmetric all extremities. Skin: Skin color-pink, texture, turgor normal. No rashes or lesions. Capillary refill normal    Neurologic: CNII-XII intact. No focal motor or sensory deficit.        Labs Reviewed:       BMP:   Lab Results   Component Value Date/Time     2023 06:30 AM    K 4.0 2023 06:30 AM     2023 06:30 AM    CO2 28 2023 06:30 AM    BUN 16 2023 06:30 AM    CREATININE 1.01 2023 06:30 AM    GLUCOSE 137 2023 06:30 AM    CALCIUM 10.6 02/28/2023 06:30 AM      Last 3 BMP:   Recent Labs     02/28/23  0630      K 4.0      CO2 28   BUN 16   CREATININE 1.01   GLUCOSE 137*   CALCIUM 10.6*    CMP:   Lab Results   Component Value Date/Time     02/28/2023 06:30 AM    K 4.0 02/28/2023 06:30 AM     02/28/2023 06:30 AM    CO2 28 02/28/2023 06:30 AM    BUN 16 02/28/2023 06:30 AM    CREATININE 1.01 02/28/2023 06:30 AM    GLUCOSE 137 02/28/2023 06:30 AM    CALCIUM 10.6 02/28/2023 06:30 AM    PROT 7.0 02/28/2023 06:30 AM    LABALBU 3.6 02/28/2023 06:30 AM    BILITOT 1.0 02/28/2023 06:30 AM    AST 63 02/28/2023 06:30 AM    ALT 55 02/28/2023 06:30 AM      Last 3 CMP:   Recent Labs     02/28/23  0630      K 4.0      CO2 28   BUN 16   CREATININE 1.01   GLUCOSE 137*   CALCIUM 10.6*   PROT 7.0   LABALBU 3.6   ALKPHOS 133*   AST 63*   ALT 55      Glucose:   Lab Results   Component Value Date/Time    GLUCOSE 137 02/28/2023 06:30 AM      Last 3 Glucose:   Recent Labs     02/28/23  0630   GLUCOSE 137*      POC Glucose:   Lab Results   Component Value Date/Time    POCGLU 98 12/01/2022 11:26 AM      Last 3 POC Glucose: No results for input(s): POCGLU in the last 72 hours. Last 3 CK, CKMB, Troponin: No results for input(s): CKTOTAL, CKMB, TROPONINI in the last 72 hours.    CBC:   Lab Results   Component Value Date/Time    WBC 6.0 02/28/2023 06:30 AM    RBC 4.97 02/28/2023 06:30 AM    HGB 13.7 02/28/2023 06:30 AM    HCT 42.4 02/28/2023 06:30 AM    MCV 85.3 02/28/2023 06:30 AM    MCH 27.6 02/28/2023 06:30 AM    MCHC 32.3 02/28/2023 06:30 AM    RDW 14.7 02/28/2023 06:30 AM     02/28/2023 06:30 AM    MPV 11.8 02/28/2023 06:30 AM      Last 3 CBC:   Recent Labs     02/28/23 0630   WBC 6.0   RBC 4.97   HGB 13.7   HCT 42.4   MCV 85.3   MCH 27.6   MCHC 32.3   RDW 14.7*      MPV 11.8      WBC:   Lab Results   Component Value Date/Time    WBC 6.0 02/28/2023 06:30 AM      Last 3 WBC:   Recent Labs     02/28/23 0630 WBC 6.0      Platelets:   Lab Results   Component Value Date/Time     02/28/2023 06:30 AM      Last 3 Platelets:   Recent Labs     02/28/23  0630         Hemoglobin/Hematocrit:   Lab Results   Component Value Date/Time    HGB 13.7 02/28/2023 06:30 AM    HCT 42.4 02/28/2023 06:30 AM      Last 3 Hemoglobin/Hematocrit:   Recent Labs     02/28/23  0630   HGB 13.7   HCT 42.4      Hepatic Function Panel:   Lab Results   Component Value Date/Time    ALKPHOS 133 02/28/2023 06:30 AM    ALKPHOS 126 01/16/2023 01:55 AM    ALT 55 02/28/2023 06:30 AM    AST 63 02/28/2023 06:30 AM    PROT 7.0 02/28/2023 06:30 AM    BILITOT 1.0 02/28/2023 06:30 AM    LABALBU 3.6 02/28/2023 06:30 AM      Last 3 Hepatic Function Panel:   Recent Labs     02/28/23  0630   ALKPHOS 133*   ALT 55   AST 63*   PROT 7.0   BILITOT 1.0   LABALBU 3.6      Albumin:   Lab Results   Component Value Date/Time    LABALBU 3.6 02/28/2023 06:30 AM      Calcium:   Lab Results   Component Value Date/Time    CALCIUM 10.6 02/28/2023 06:30 AM      Ionized Calcium: No results found for: IONCA   Magnesium:   Lab Results   Component Value Date/Time    MG 2.1 02/28/2023 06:30 AM      ABGs: No results found for: PHART, PO2ART, [id number], JPU3MLO, BEART, [id number]   Lactic Acid: No results found for: LACTA   Amylase: No results found for: AMYLASE   Last 3 Amylase: No results for input(s): AMYLASE in the last 72 hours. Lipase: No results found for: LIPASE   Last 3 Lipase: No results for input(s): LIPASE in the last 72 hours. BNP:   Lab Results   Component Value Date    BNP 9,279 (H) 01/16/2023      Last 3 BNP: No results for input(s): BNP in the last 72 hours.    Lipids No results found for: CHOL, TRIG, LDLCALC, LABVLDL, CHOLHDLRATIO  Cardiac Enzymes:   Lab Results   Component Value Date    CKTOTAL 1,266 (H) 10/16/2022     Urin analysis: No results for input(s): Dimitri Martinez, SPECGRAV, PHUR, PROTEINU, GLUCOSEU, Kimberly Tricia, Maggie Copper in the last 72 hours. Invalid input(s): FLOODU    Imaging results last 24 hrs :XR CHEST PORTABLE    Result Date: 2/28/2023  INDICATION: Shortness of Breath EXAM:  AP CHEST RADIOGRAPH COMPARISON: January 16, 2023 FINDINGS: AP portable view of the chest demonstrates a normal cardiomediastinal silhouette. Diffuse interstitial and airspace opacities. No pleural effusion or pneumothorax. The osseous structures are unremarkable. Diffuse interstitial and airspace opacities may represent pulmonary edema and/or pneumonia. Imaging results impression onlyXR CHEST PORTABLE    Result Date: 2/28/2023  Diffuse interstitial and airspace opacities may represent pulmonary edema and/or pneumonia. XR CHEST PORTABLE   Final Result   Diffuse interstitial and airspace opacities may represent pulmonary edema and/or   pneumonia. LAST EKG  Results for orders placed or performed during the hospital encounter of 02/28/23   EKG 12 Lead   Result Value Ref Range    Ventricular Rate 56 BPM    Atrial Rate 56 BPM    P-R Interval 146 ms    QRS Duration 96 ms    Q-T Interval 430 ms    QTc Calculation (Bazett) 414 ms    P Axis 59 degrees    R Axis 111 degrees    T Axis 15 degrees    Diagnosis       Sinus bradycardia with sinus arrhythmia with occasional premature ventricular   complexes           Ventilator setting: No results found for: FIO2, MODE, SETTIDVOL, SETPEEP    VENT SETTINGS (Comprehensive)     Additional Respiratory Assessments  Heart Rate: 62  Resp: 24  SpO2: 94 %     ABGs: No results found for: PHART, PO2ART, [id number]     No results found for: IFIO2, MODE, SETTIDVOL, SETPEEP         XR CHEST PORTABLE    Result Date: 2/28/2023  INDICATION: Shortness of Breath EXAM:  AP CHEST RADIOGRAPH COMPARISON: January 16, 2023 FINDINGS: AP portable view of the chest demonstrates a normal cardiomediastinal silhouette. Diffuse interstitial and airspace opacities. No pleural effusion or pneumothorax. The osseous structures are unremarkable. Diffuse interstitial and airspace opacities may represent pulmonary edema and/or pneumonia.       Ynes Perez MD, Internal Medicine     CC: None None

## 2023-02-28 NOTE — ED PROVIDER NOTES
EMERGENCY DEPARTMENT HISTORY AND PHYSICAL EXAM      Patient Name: Alona Sanches  MRN: [id number]  YOB: 1970  Provider: Ricarda Arnett MD  PCP: None None   Time/Date of evaluation: 6:53 AM EST on 2/28/23    History of Presenting Illness     Chief Complaint   Patient presents with    Shortness of Breath       History Provided By: Patient     History Fernando Montenegro):   Alona Sanches is a 45 y.o. male with a PMHX of diabetes, hypertension, CHF, active tobacco smoker  who presents to the emergency department  by POV C/O chest pain and shortness of breath onset a few months ago but worse this month. Patient states symptoms are constant without clear relieving or exacerbating factors. Patient seems very sleepy and falls asleep repeatedly during HPI but arouses to voice. He states has been taking all his medications as prescribed. He states he does not have a local doctor because he recently moved here from [place]. Denies fever, cough, vomiting, bowel or urinary complaints. Past History     Past Medical History:  Past Medical History:   Diagnosis Date    Diabetes [facility])        Past Surgical History:  No past surgical history on file.     Family History:  Family History   Problem Relation Age of Onset    Cancer Mother     Alcohol Abuse Father        Social History:  Social History     Tobacco Use    Smoking status: Every Day     Packs/day: 1.00     Types: Cigarettes    Smokeless tobacco: Never   Substance Use Topics    Alcohol use: Yes    Drug use: Not Currently       Medications:  Current Facility-Administered Medications   Medication Dose Route Frequency Provider Last Rate Last Admin    sodium chloride flush 0.9 % injection 5-40 mL  5-40 mL IntraVENous 2 times per day Mitch Palacio MD        sodium chloride flush 0.9 % injection 5-40 mL  5-40 mL IntraVENous PRN Mitch Palacio MD        0.9 % sodium chloride infusion   IntraVENous PRN Mitch Plaacio MD        enoxaparin (LOVENOX) injection 40 mg  40 mg SubCUTAneous Daily Mitch Palacio MD        ondansetron (ZOFRAN-ODT) disintegrating tablet 4 mg  4 mg Oral Q8H PRN Jose Montoya MD        Or    ondansetron [facility]) injection 4 mg  4 mg IntraVENous Q6H PRN Jose Montoya MD        polyethylene glycol (GLYCOLAX) packet 17 g  17 g Oral Daily PRN Jose Montoya MD        acetaminophen (TYLENOL) tablet 650 mg  650 mg Oral Q6H PRN Jose Montoya MD        Or    acetaminophen (TYLENOL) suppository 650 mg  650 mg Rectal Q6H PRN Jose Montoya MD         Current Outpatient Medications   Medication Sig Dispense Refill    carvedilol (COREG) 3.125 MG tablet Take 3.125 mg by mouth 2 times daily (with meals)         Allergies:  No Known Allergies    Social Determinants of Health:  Social Determinants of Health     Tobacco Use: High Risk    Smoking Tobacco Use: Every Day    Smokeless Tobacco Use: Never    Passive Exposure: Not on file   Alcohol Use: Not on file   Financial Resource Strain: Not on file   Food Insecurity: Not on file   Transportation Needs: Not on file   Physical Activity: Not on file   Stress: Not on file   Social Connections: Not on file   Intimate Partner Violence: Not on file   Depression: Not on file   Housing Stability: Not on file       Review of Systems     Negative except as listed above in HPI.     Physical Exam     Vitals:    02/28/23 0711 02/28/23 0741 02/28/23 0752 02/28/23 0826   BP:  94/79 (!) 110/93 104/84   Pulse:  99  62   Resp:  20  24   Temp:       TempSrc:       SpO2: 97% 96%  94%   Weight:       Height:           Constitutional: Non-toxic appearing, moderate distress  Head: Normocephalic, Atraumatic  Neck: Supple  Cardiovascular: Regular rate and rhythm, no murmurs, rubs, or gallops  Chest: Normal work of breathing and chest excursion bilaterally  Lungs: Clear to ausculation bilaterally  Abdomen: Soft, non tender, non distended  Back: No evidence of trauma or deformity  Extremities: No evidence of trauma or deformity, no LE edema  Skin: Warm and dry, normal cap refill  Neuro: Sleepy but arouses to voice and answers questions appropriately, face symmetric, normal speech  Psychiatric: Normal mood and affect     Diagnostic Study Results     Labs:  Recent Results (from the past 12 hour(s))   EKG 12 Lead    Collection Time: 02/28/23  6:09 AM   Result Value Ref Range    Ventricular Rate 98 BPM    Atrial Rate 98 BPM    P-R Interval 142 ms    QRS Duration 94 ms    Q-T Interval 380 ms    QTc Calculation (Bazett) 485 ms    P Axis 61 degrees    R Axis 104 degrees    T Axis 17 degrees    Diagnosis       Sinus rhythm with frequent premature ventricular complexes   CBC with Auto Differential    Collection Time: 02/28/23  6:30 AM   Result Value Ref Range    WBC 6.0 4.6 - 13.2 K/uL    RBC 4.97 4.35 - 5.65 M/uL    Hemoglobin 13.7 13.0 - 16.0 g/dL    Hematocrit 42.4 36.0 - 48.0 %    MCV 85.3 78.0 - 100.0 FL    MCH 27.6 24.0 - 34.0 PG    MCHC 32.3 31.0 - 37.0 g/dL    RDW 14.7 (H) 11.6 - 14.5 %    Platelets 979 807 - 745 K/uL    MPV 11.8 9.2 - 11.8 FL    Nucleated RBCs 0.0 0  WBC    nRBC 0.00 0.00 - 0.01 K/uL    Seg Neutrophils 58 40 - 73 %    Lymphocytes 29 21 - 52 %    Monocytes 11 (H) 3 - 10 %    Eosinophils % 2 0 - 5 %    Basophils 1 0 - 2 %    Immature Granulocytes 0 0.0 - 0.5 %    Segs Absolute 3.5 1.8 - 8.0 K/UL    Absolute Lymph # 1.7 0.9 - 3.6 K/UL    Absolute Mono # 0.6 0.05 - 1.2 K/UL    Absolute Eos # 0.1 0.0 - 0.4 K/UL    Basophils Absolute 0.0 0.0 - 0.1 K/UL    Absolute Immature Granulocyte 0.0 0.00 - 0.04 K/UL    Differential Type AUTOMATED     Comprehensive Metabolic Panel    Collection Time: 02/28/23  6:30 AM   Result Value Ref Range    Sodium 140 136 - 145 mmol/L    Potassium 4.0 3.5 - 5.5 mmol/L    Chloride 110 100 - 111 mmol/L    CO2 28 21 - 32 mmol/L    Anion Gap 2 (L) 3.0 - 18 mmol/L    Glucose 137 (H) 74 - 99 mg/dL    BUN 16 7.0 - 18 MG/DL    Creatinine 1.01 0.6 - 1.3 MG/DL    Bun/Cre Ratio 16 12 - 20      Est, Glom Filt Rate >60 >60 ml/min/1.73m2    Calcium 10.6 (H) 8.5 - 10.1 MG/DL    Total Bilirubin 1.0 0.2 - 1.0 MG/DL    ALT 55 16 - 61 U/L    AST 63 (H) 10 - 38 U/L    Alk Phosphatase 133 (H) 45 - 117 U/L    Total Protein 7.0 6.4 - 8.2 g/dL    Albumin 3.6 3.4 - 5.0 g/dL    Globulin 3.4 2.0 - 4.0 g/dL    Albumin/Globulin Ratio 1.1 0.8 - 1.7     Magnesium    Collection Time: 02/28/23  6:30 AM   Result Value Ref Range    Magnesium 2.1 1.6 - 2.6 mg/dL   D-Dimer, Quantitative    Collection Time: 02/28/23  6:30 AM   Result Value Ref Range    D-Dimer, Quant 0.43 <0.46 ug/ml(FEU)   ETOH    Collection Time: 02/28/23  6:30 AM   Result Value Ref Range    Ethanol Lvl <3 0 - 3 MG/DL   Troponin    Collection Time: 02/28/23  6:30 AM   Result Value Ref Range    Troponin, High Sensitivity 61 0 - 78 ng/L   Brain Natriuretic Peptide    Collection Time: 02/28/23  6:30 AM   Result Value Ref Range    NT Pro-BNP 4,560 (H) 0 - 900 PG/ML   Urine Drug Screen    Collection Time: 02/28/23  7:18 AM   Result Value Ref Range    Benzodiazepines, Urine Negative NEG      Barbiturates, Urine Negative NEG      THC, TH-Cannabinol, Urine Negative NEG      Opiates, Urine Negative NEG      PCP, Urine Negative NEG      Cocaine, Urine Negative NEG      Amphetamine, Urine Negative NEG      Methadone, Urine Negative NEG      Comments: (NOTE)    EKG 12 Lead    Collection Time: 02/28/23  7:41 AM   Result Value Ref Range    Ventricular Rate 56 BPM    Atrial Rate 56 BPM    P-R Interval 146 ms    QRS Duration 96 ms    Q-T Interval 430 ms    QTc Calculation (Bazett) 414 ms    P Axis 59 degrees    R Axis 111 degrees    T Axis 15 degrees    Diagnosis       Sinus bradycardia with sinus arrhythmia with occasional premature ventricular   complexes     Troponin    Collection Time: 02/28/23  7:51 AM   Result Value Ref Range    Troponin, High Sensitivity 58 0 - 78 ng/L       Radiologic Studies:   XR CHEST PORTABLE   Final Result   Diffuse interstitial and airspace opacities may represent pulmonary edema and/or   pneumonia.           EKG interpretation: (Preliminary)  EKG read by Dr. Seda Ybarra at 7:01 AM  Sinus rhythm at a rate of 98 bpm, CA interval 142 ms, QRS duration of 94 ms, similar compared to prior from January 2023    Repeat EKG interpretation: (Preliminary)  EKG read by Dr. Seda Ybarra at 8:01 AM  Sinus bradycardia at a rate of 56 bpm, CA interval 146 ms, QRS duration of 96 ms, no longer tachycardic, similar QRS morphology when compared to prior, no acute ischemic changes    8:31 AM EST  Patient had 7 beat run of V. tach on monitor and then returned to sinus rhythm. Consult placed for cardiology. Procedures     Procedures    ED Course     6:53 AM EST I Seda Ybarra MD) am the first provider for this patient. Initial assessment performed. I reviewed the vital signs, available nursing notes, past medical history, past surgical history, family history and social history. The patients presenting problems have been discussed, and they are in agreement with the care plan formulated and outlined with them. I have encouraged them to ask questions as they arise throughout their visit. Records Reviewed: Nursing Notes, Old Medical Records, and Previous EKGs    Is this patient to be included in the SEP-1 core measure due to severe sepsis or septic shock? No   Exclusion criteria - the patient is NOT to be included for SEP-1 Core Measure due to:   Infection is not suspected    MEDICATIONS ADMINISTERED IN THE ED:  Medications   sodium chloride flush 0.9 % injection 5-40 mL (has no administration in time range)   sodium chloride flush 0.9 % injection 5-40 mL (has no administration in time range)   0.9 % sodium chloride infusion (has no administration in time range)   enoxaparin (LOVENOX) injection 40 mg (has no administration in time range)   ondansetron (ZOFRAN-ODT) disintegrating tablet 4 mg (has no administration in time range)     Or   ondansetron (ZOFRAN) injection 4 mg (has no administration in time range)   polyethylene glycol (GLYCOLAX) packet 17 g (has no administration in time range)   acetaminophen (TYLENOL) tablet 650 mg (has no administration in time range)     Or   acetaminophen (TYLENOL) suppository 650 mg (has no administration in time range)   furosemide (LASIX) injection 40 mg (40 mg IntraVENous Given 2/28/23 0752)            Medical Decision Making     CC/HPI Summary, DDx, ED Course, and Reassessment:     HEART Score    History  0: Slightly suspicious  EKG  1: Nonspecific repolraization disturbance  Age  1: 44-72  Risk Factors  2: 3+, Prior CAD, PVD, CVA  Risk Factors:  Hypercholesterolemia, Hypertension, Diabetes Mellitus, Cigarette smoking, and Prior CAD  Troponin  0: Negative     Total: 4  0-3: Low risk: less than 2% risk of Major Adverse Cardiac Event at 6 weeks. 4-6: Intermediate risk: 12-16.6% risk of Major Adverse Cardiac Event at 6 weeks, requires further management or admission  7-10: High risk: 50-65% risk of Major Adverse Cardiac Event at 6 weeks, requires further management or admission. CONSULT NOTE:   8:50 AM EST  Dr. Refugio Porter spoke with Dr. Helene Pisano   Specialty: Cardiology  Discussed pt's hx, disposition, and available diagnostic and imaging results over the telephone. Reviewed care plans. Will consult on the patient, admit to hopitalist.      8:53 AM EST  Updated patient on all results and plan. All questions answered. CONSULT NOTE:   9:14 AM EST  Dr. Refugio Porter spoke with Dr. Kassie Chou  Specialty: Hospitalist  Discussed pt's hx, disposition, and available diagnostic and imaging results over the telephone. Reviewed care plans. Accepts to telemetry. Disposition Considerations (tests considered but not done, Admit vs D/C, Shared Decision Making, Pt Expectation of Test or Tx.): 38-year-old male with history of nonischemic cardiomyopathy presenting with chest pain and shortness of breath. Patient has had irregular heart rate here ranging anywhere from bradycardic in the 50s to in the low 110s.   Consistently sinus but having frequent PVCs and a run of V. tach here on the monitor. ACS considered, heart score of 4, high-sensitivity troponin and EKG without acute ischemic changes. PE considered, Wells low risk, PERC positive, D-dimer negative. Infectious etiology considered, no pneumonia on chest x-ray and afebrile with a normal white count. Dehydration and electrolyte abnormalities considered but no acute abnormalities identified on labs. Toxicology considered and drug screen and alcohol are negative. Patient reports compliance with all his medications. Discussed with both cardiology and hospitalist for further in-hospital evaluation management of patient's arrhythmia. Patient understands and agrees with this plan. Critical Care Time:       I have spent 36 minutes of critical care time involved in lab review, consultations with specialist, family decision-making, and documentation. During this entire length of time I was immediately available to the patient. This time excludes separately billable procedures. Critical Care: The reason for providing this level of medical care for this critically ill patient was due a critical illness that impaired one or more vital organ systems such that there was a high probability of imminent or life threatening deterioration in the patients condition. This care involved high complexity decision making to assess, manipulate, and support vital system functions, to treat this degreee vital organ system failure and to prevent further life threatening deterioration of the patients condition. Christina Ojeda MD    Diagnosis and Disposition     DIAGNOSIS:   1. Other chest pain    2. Other cardiac arrhythmia    3. Acute pulmonary edema (HCC)    4. Acute on chronic congestive heart failure, unspecified heart failure type ([facility]        DISPOSITION Admitted 02/28/2023 09:16:33 AM      PATIENT REFERRED TO:  No follow-up provider specified.     DISCHARGE MEDICATIONS:  New Prescriptions    No medications on file       DISCONTINUED MEDICATIONS:  Discontinued Medications    No medications on file              (Please note that portions of this note were completed with a voice recognition program.  Efforts were made to edit the dictations but occasionally words are mis-transcribed.)    Ami Keith MD (electronically signed)    Fanta Villegas MD am the primary clinician of record. Dragon Disclaimer     Please note that this dictation was completed with [employer], the computer voice recognition software. Quite often unanticipated grammatical, syntax, homophones, and other interpretive errors are inadvertently transcribed by the computer software. Please disregard these errors. Please excuse any errors that have escaped final proofreading.     Ami Keith MD  (Electronically signed)           Cholo Manuel MD  02/28/23 8554       Cholo Manuel MD  03/09/23 [phone] (3) walks occasionally

## 2023-02-28 NOTE — ED TRIAGE NOTES
Pt to be eval for SOB and l sided cp that has been ongoing for the past 3-4 months w/o resolution. Pt is a PPD smoker and states he's is trying to quit. Pt states the SOB takes place throughout the day.

## 2023-03-01 ENCOUNTER — HOSPITAL ENCOUNTER (INPATIENT)
Facility: HOSPITAL | Age: 53
Discharge: HOME | DRG: 194 | End: 2023-03-04
Payer: MEDICAID

## 2023-03-01 PROBLEM — I50.43 ACUTE ON CHRONIC COMBINED SYSTOLIC AND DIASTOLIC CONGESTIVE HEART FAILURE (HCC): Status: ACTIVE | Noted: 2022-11-27

## 2023-03-01 PROBLEM — B19.20 HCV (HEPATITIS C VIRUS): Status: ACTIVE | Noted: 2023-03-01

## 2023-03-01 LAB
AMMONIA PLAS-SCNC: 55 UMOL/L (ref 11–32)
ANION GAP SERPL CALC-SCNC: 2 MMOL/L (ref 3–18)
BASOPHILS # BLD: 0 K/UL (ref 0–0.1)
BASOPHILS NFR BLD: 1 % (ref 0–2)
BUN SERPL-MCNC: 20 MG/DL (ref 7–18)
BUN/CREAT SERPL: 25 (ref 12–20)
CALCIUM SERPL-MCNC: 10.4 MG/DL (ref 8.5–10.1)
CHLORIDE SERPL-SCNC: 109 MMOL/L (ref 100–111)
CO2 SERPL-SCNC: 27 MMOL/L (ref 21–32)
CREAT SERPL-MCNC: 0.8 MG/DL (ref 0.6–1.3)
DIFFERENTIAL METHOD BLD: ABNORMAL
EOSINOPHIL # BLD: 0.1 K/UL (ref 0–0.4)
EOSINOPHIL NFR BLD: 2 % (ref 0–5)
ERYTHROCYTE [DISTWIDTH] IN BLOOD BY AUTOMATED COUNT: 14.7 % (ref 11.6–14.5)
GLUCOSE BLD STRIP.AUTO-MCNC: 112 MG/DL (ref 70–110)
GLUCOSE BLD STRIP.AUTO-MCNC: 114 MG/DL (ref 70–110)
GLUCOSE BLD STRIP.AUTO-MCNC: 121 MG/DL (ref 70–110)
GLUCOSE BLD STRIP.AUTO-MCNC: 222 MG/DL (ref 70–110)
GLUCOSE SERPL-MCNC: 145 MG/DL (ref 74–99)
HCT VFR BLD AUTO: 42.4 % (ref 36–48)
HGB BLD-MCNC: 13.5 G/DL (ref 13–16)
IMM GRANULOCYTES # BLD AUTO: 0 K/UL (ref 0–0.04)
IMM GRANULOCYTES NFR BLD AUTO: 0 % (ref 0–0.5)
LYMPHOCYTES # BLD: 1.4 K/UL (ref 0.9–3.6)
LYMPHOCYTES NFR BLD: 27 % (ref 21–52)
MAGNESIUM SERPL-MCNC: 2 MG/DL (ref 1.6–2.6)
MCH RBC QN AUTO: 27.6 PG (ref 24–34)
MCHC RBC AUTO-ENTMCNC: 31.8 G/DL (ref 31–37)
MCV RBC AUTO: 86.7 FL (ref 78–100)
MONOCYTES # BLD: 0.4 K/UL (ref 0.05–1.2)
MONOCYTES NFR BLD: 8 % (ref 3–10)
NEUTS SEG # BLD: 3.2 K/UL (ref 1.8–8)
NEUTS SEG NFR BLD: 62 % (ref 40–73)
NRBC # BLD: 0 K/UL (ref 0–0.01)
NRBC BLD-RTO: 0 PER 100 WBC
PLATELET # BLD AUTO: 182 K/UL (ref 135–420)
PMV BLD AUTO: 11.8 FL (ref 9.2–11.8)
POTASSIUM SERPL-SCNC: 4.1 MMOL/L (ref 3.5–5.5)
RBC # BLD AUTO: 4.89 M/UL (ref 4.35–5.65)
SODIUM SERPL-SCNC: 138 MMOL/L (ref 136–145)
TROPONIN I SERPL HS-MCNC: 50 NG/L (ref 0–78)
TROPONIN I SERPL HS-MCNC: 51 NG/L (ref 0–78)
TSH SERPL DL<=0.05 MIU/L-ACNC: 0.74 UIU/ML (ref 0.36–3.74)
WBC # BLD AUTO: 5.1 K/UL (ref 4.6–13.2)

## 2023-03-01 PROCEDURE — 2580000003 HC RX 258: Performed by: HOSPITALIST

## 2023-03-01 PROCEDURE — 83735 ASSAY OF MAGNESIUM: CPT

## 2023-03-01 PROCEDURE — 82962 GLUCOSE BLOOD TEST: CPT

## 2023-03-01 PROCEDURE — 2500000003 HC RX 250 WO HCPCS: Performed by: HOSPITALIST

## 2023-03-01 PROCEDURE — 84443 ASSAY THYROID STIM HORMONE: CPT

## 2023-03-01 PROCEDURE — 70450 CT HEAD/BRAIN W/O DYE: CPT

## 2023-03-01 PROCEDURE — 84484 ASSAY OF TROPONIN QUANT: CPT

## 2023-03-01 PROCEDURE — 6360000002 HC RX W HCPCS: Performed by: HOSPITALIST

## 2023-03-01 PROCEDURE — 74177 CT ABD & PELVIS W/CONTRAST: CPT

## 2023-03-01 PROCEDURE — 6360000004 HC RX CONTRAST MEDICATION: Performed by: HOSPITALIST

## 2023-03-01 PROCEDURE — 6370000000 HC RX 637 (ALT 250 FOR IP): Performed by: HOSPITALIST

## 2023-03-01 PROCEDURE — 85025 COMPLETE CBC W/AUTO DIFF WBC: CPT

## 2023-03-01 PROCEDURE — 82140 ASSAY OF AMMONIA: CPT

## 2023-03-01 PROCEDURE — 80048 BASIC METABOLIC PNL TOTAL CA: CPT

## 2023-03-01 PROCEDURE — 71275 CT ANGIOGRAPHY CHEST: CPT

## 2023-03-01 PROCEDURE — 6360000002 HC RX W HCPCS: Performed by: INTERNAL MEDICINE

## 2023-03-01 PROCEDURE — 1100000003 HC PRIVATE W/ TELEMETRY

## 2023-03-01 PROCEDURE — 36415 COLL VENOUS BLD VENIPUNCTURE: CPT

## 2023-03-01 RX ORDER — FUROSEMIDE 10 MG/ML
20 INJECTION INTRAMUSCULAR; INTRAVENOUS 2 TIMES DAILY
Status: DISCONTINUED | OUTPATIENT
Start: 2023-03-01 | End: 2023-03-05 | Stop reason: HOSPADM

## 2023-03-01 RX ADMIN — CEFTRIAXONE 1000 MG: 1 INJECTION, POWDER, FOR SOLUTION INTRAMUSCULAR; INTRAVENOUS at 17:29

## 2023-03-01 RX ADMIN — DOXYCYCLINE 100 MG: 100 INJECTION, POWDER, LYOPHILIZED, FOR SOLUTION INTRAVENOUS at 00:00

## 2023-03-01 RX ADMIN — SODIUM CHLORIDE, PRESERVATIVE FREE 10 ML: 5 INJECTION INTRAVENOUS at 10:50

## 2023-03-01 RX ADMIN — LACTULOSE 20 G: 20 SOLUTION ORAL at 15:58

## 2023-03-01 RX ADMIN — IOPAMIDOL 100 ML: 755 INJECTION, SOLUTION INTRAVENOUS at 04:50

## 2023-03-01 RX ADMIN — FUROSEMIDE 20 MG: 10 INJECTION, SOLUTION INTRAMUSCULAR; INTRAVENOUS at 18:44

## 2023-03-01 RX ADMIN — INSULIN LISPRO 1 UNITS: 100 INJECTION, SOLUTION INTRAVENOUS; SUBCUTANEOUS at 10:35

## 2023-03-01 RX ADMIN — DOXYCYCLINE 100 MG: 100 INJECTION, POWDER, LYOPHILIZED, FOR SOLUTION INTRAVENOUS at 23:30

## 2023-03-01 RX ADMIN — LACTULOSE 20 G: 20 SOLUTION ORAL at 21:06

## 2023-03-01 RX ADMIN — SODIUM CHLORIDE, PRESERVATIVE FREE 10 ML: 5 INJECTION INTRAVENOUS at 21:12

## 2023-03-01 RX ADMIN — DOXYCYCLINE 100 MG: 100 INJECTION, POWDER, LYOPHILIZED, FOR SOLUTION INTRAVENOUS at 10:20

## 2023-03-01 RX ADMIN — LACTULOSE 20 G: 20 SOLUTION ORAL at 10:25

## 2023-03-01 RX ADMIN — FUROSEMIDE 20 MG: 10 INJECTION, SOLUTION INTRAMUSCULAR; INTRAVENOUS at 10:29

## 2023-03-01 NOTE — CONSULTS
[facility] Consult Note      Patient: Faye Lance MRN: [id number]  SSN: [id number]    YOB: 1970  Age: 45 y.o. Sex: male    Date of Consultation: 02/28/2023  Referring Physician: Julius Meckel MD  Reason for Consultation: CHF, NSVT    Chief complain: Shortness of breath    HPI:  57-year-old gentleman came to emergency with complaining of worsening of shortness of breath for last 2-3 days. He is complaining of orthopnea and PND. Denies any leg swelling. Denies any dizziness, palpitation, presyncope or syncope. He is complaining of midsternal chest tightness. Chest tightness is continues, nonradiating. Denies any fever or cough. He is not taking his medication regularly. He is current smoker. Denies any alcohol abuse. Cardiology consult called for evaluation of CHF and NSVT. Past Medical History:   Diagnosis Date    Diabetes ([facility]      No past surgical history on file.   Current Facility-Administered Medications   Medication Dose Route Frequency    sodium chloride flush 0.9 % injection 5-40 mL  5-40 mL IntraVENous 2 times per day    sodium chloride flush 0.9 % injection 5-40 mL  5-40 mL IntraVENous PRN    0.9 % sodium chloride infusion   IntraVENous PRN    enoxaparin (LOVENOX) injection 40 mg  40 mg SubCUTAneous Daily    ondansetron (ZOFRAN-ODT) disintegrating tablet 4 mg  4 mg Oral Q8H PRN    Or    ondansetron (ZOFRAN) injection 4 mg  4 mg IntraVENous Q6H PRN    polyethylene glycol (GLYCOLAX) packet 17 g  17 g Oral Daily PRN    acetaminophen (TYLENOL) tablet 650 mg  650 mg Oral Q6H PRN    Or    acetaminophen (TYLENOL) suppository 650 mg  650 mg Rectal Q6H PRN    [Held by provider] carvedilol (COREG) tablet 3.125 mg  3.125 mg Oral BID WC    glucose chewable tablet 16 g  4 tablet Oral PRN    dextrose bolus 10% 125 mL  125 mL IntraVENous PRN    Or    dextrose bolus 10% 250 mL  250 mL IntraVENous PRN    glucagon (rDNA) injection 1 mg  1 mg SubCUTAneous PRN    dextrose 10 % infusion   IntraVENous Continuous PRN    insulin lispro (HUMALOG) injection vial 0-4 Units  0-4 Units SubCUTAneous TID WC    insulin lispro (HUMALOG) injection vial 0-4 Units  0-4 Units SubCUTAneous Nightly    nicotine (NICODERM CQ) 14 MG/24HR 1 patch  1 patch TransDERmal Daily    cefTRIAXone (ROCEPHIN) 1,000 mg in sodium chloride 0.9 % 50 mL IVPB (mini-bag)  1,000 mg IntraVENous Q24H    doxycycline (VIBRAMYCIN) 100 mg in sodium chloride 0.9 % 100 mL IVPB (mini-bag)  100 mg IntraVENous Q12H    [Held by provider] furosemide (LASIX) injection 40 mg  40 mg IntraVENous BID    [Held by provider] potassium chloride (KLOR-CON M) extended release tablet 20 mEq  20 mEq Oral BID     lactulose (CHRONULAC) 10 GM/15ML solution 20 g  20 g Oral TID       Allergies and Intolerances:   No Known Allergies    Family History:   Family History   Problem Relation Age of Onset    Cancer Mother     Alcohol Abuse Father        Social History:   He  reports that he has been smoking cigarettes. He has been smoking an average of 1 pack per day. He has never used smokeless tobacco.  He  reports current alcohol use. Review of Systems:     Gen: No fever, chills, malaise, weight loss/gain. Heent: No headache, rhinorrhea, epistaxis, ear pain, hearing loss, sinus pain, neck pain/stiffness, sore throat. Heart:   Positive chest pain, no palpitations, positive shortness of breath on exertion, pnd, or orthopnea. Resp: No cough, hemoptysis, wheezing and  dyspnea. GI: No nausea, vomiting, diarrhea, constipation, melena or hematochezia. : No urinary obstruction, dysuria or hematuria. Derm: No rash, new skin lesion or pruritis. Musc/skeletal: no bone or joint complains. Vasc: No edema, cyanosis or claudication. Endo: No heat/cold intolerance, no polyuria,polydipsia or polyphagia. Neuro: No unilateral weakness, numbness, tingling. No seizures. Heme: No easy bruising or bleeding.       Physical:    Afebrile, heart rate 100 beats per minute, respiratory rate 14/min,  blood pressure 92/70 mm hg    Exam:   General Appearance: Looks chronically ill,. Comfortable, not using accessory muscles of respiration. HEENT: TYESHA. HEAD: Atraumatic  NECK: No JVD, no thyroidomeglay. CAROTIDS:  LUNGS: bilateral coarse  breath sound, occasional crepitation   HEART: S1+S2 audible, no murmur, no pericardial rub. ABD: Non-tender, BS Audible    EXT: No edema, and no cyanosis. VASCULAR EXAM: Pulses are intact. PSYCHIATRIC EXAM: Mood is appropriate. MUSCULOSKELETAL: Grossly no joint deformity.   NEUROLOGICAL: AAO times 3, Motor and sensory sytem intact       Review of Data:   LABS:   Lab Results   Component Value Date/Time    WBC 6.0 02/28/2023 06:30 AM    HGB 13.7 02/28/2023 06:30 AM    HCT 42.4 02/28/2023 06:30 AM     02/28/2023 06:30 AM     Lab Results   Component Value Date/Time     02/28/2023 06:30 AM    K 4.0 02/28/2023 06:30 AM     02/28/2023 06:30 AM    CO2 28 02/28/2023 06:30 AM    BUN 16 02/28/2023 06:30 AM     No results found for: CHOL, CHOLX, CHLST, CHOLV, HDL, HDLC, LDL, LDLC, TGLX, TRIGL  No components found for: GPT  Hemoglobin A1C   Date Value Ref Range Status   02/28/2023 6.6 (H) 4.2 - 5.6 % Final     Comment:     (NOTE)  HbA1C Interpretive Ranges  <5.7              Normal  5.7 - 6.4         Consider Prediabetes  >6.5              Consider Diabetes           Cardiology Procedures:  sinus rhythm, frequent PVCs, lateral infarct age undetermined, nonspecific ST-T changes        Impression / Plan:    Patient Active Problem List   Diagnosis    DM (diabetes mellitus) ([facility]    Acute on chronic systolic congestive heart failure (HCC)    CHF (congestive heart failure) ([facility]    Cardiomyopathy ([facility]    Nonrheumatic mitral valve regurgitation    Arrhythmia    Lung infiltrate    Smoker    HTN (hypertension)      NSVT    Echocardiogram was done in 11/2022 reported        Left Ventricle: Reduced left ventricular systolic function with a visually estimated EF of 30 - 35%. Left ventricle is severely dilated. Normal wall thickness. Severe global hypokinesis present. Grade II diastolic dysfunction with increased LAP. Aortic Valve: Tricuspid valve. Mitral Valve: Mildly thickened leaflet, at the anterior leaflet. Mild regurgitation. Left Atrium: Left atrium is mildly dilated. Cardiac catheterization was done in November 2022 reported    Patent coronary arteries with minimal luminal irregularities  LVEDP 6 mmHg   PCWP 2 mmHg   PA 17/7 mean PA pressure 11 mmHg     Findings are consistent with nonischemic cardiomyopathy. Plan:    Change Lasix to 20 mg IV twice a day due to borderline blood pressure. Hold carvedilol for now. Will add beta-blocker, ACE inhibitor/ ARB and spironolactone as per heart rate/ blood pressure response. Monitor renal function and electrolytes. Input output  Salt restriction up to 2 g per day and fluid restriction up to 1.2-1.4 L per day.     Strongly advised about medication adherence      Signed By: Augustina Winston MD     February 28, 2023

## 2023-03-01 NOTE — PROGRESS NOTES
Hospitalist Progress Note-critical care note     Patient: France Marie MRN: [id number]  CSN: [id number]    YOB: 1970  Age: 45 y.o. Sex: male    DOA: 2/28/2023 LOS:  LOS: 1 day            Chief complaint: acute on chronic chf, smoker , renal lesion, cardiomyopathy     Assessment/Plan         Active Hospital Problems    Diagnosis Date Noted    HCV (hepatitis C virus) [B19.20] 03/01/2023     Priority: Medium    Arrhythmia [I49.9] 02/28/2023     Priority: Medium    Lung infiltrate [R91.8] 02/28/2023     Priority: Medium    Smoker [F17.200] 02/28/2023     Priority: Medium    HTN (hypertension) [I10] 02/28/2023     Priority: Medium    DM (diabetes mellitus) ([address] [E11.9] 11/28/2022    Acute on chronic combined systolic and diastolic congestive heart failure ([address] [I50.43] 11/27/2022    Cardiomyopathy ([facility] [address] [I42.9] 11/27/2022    Nonrheumatic mitral valve regurgitation [I34.0] 11/27/2022       Acute on chronic combined diastolic and systolic heart failure  Repeated echo with ef 15-20 %  Dr. Babatunde Meza seeing pt   Low dose lasix continued due to low bp   Limit fluid 1.8 L and sodium 2 g      Arrhythmia  PVC from cardiac monitor, non- SVT 7 beats  Cardiac monitor optimize electrolytes  Echo, check TSH  Urine drug screen negative     Lung infiltration  Rocephin doxycycline, symptom treatment     Cardiomyopathy/MVR     Smoker  Continue educate patient nicotine patch      Hcv   Found out one week ago-will see gi per pcp referral -recommend to reschedule appointment asap     6.3 cm x 6.4 cm mildly hypointense left renal lesion from ct   Will have mri for possible renal mass     Subjective still sob    Ct results discussed with pt and including echo report, he said he was diagnoses as HCV one week ago, suppose to have appointment with gi today,-recommend to reschedule appointment   All questions have been answered. 35 total min's spent on patient care including >50% on counseling/coordinating care.  Discussed the above assessments. also discussed labs, medications and hospital course        Disposition :tbd,   Review of systems:    General: No fevers or chills. Cardiovascular: No chest pain or pressure. No palpitations. Pulmonary: +shortness of breath. Gastrointestinal: No nausea, vomiting. Vital signs/Intake and Output:  Visit Vitals  /61   Pulse 99   Temp 98 °F (36.7 °C) (Oral)   Resp 16   Ht 6' (1.829 m)   Wt 213 lb 13.5 oz (97 kg)   SpO2 100%   BMI 29.00 kg/m²     Current Shift:  03/01 0701 - 03/01 1900  In: 240 [P.O.:240]  Out: -   Last three shifts:  02/27 1901 - 03/01 0700  In: -   Out: 1160 [Urine:1160]    Physical Exam:  General: WD, WN. Alert, cooperative, no acute distress    HEENT: NC, Atraumatic. PERRLA, anicteric sclerae. Lungs: CTA Bilaterally. No Wheezing/Rhonchi/Rales. Heart:  Regular  rhythm,  No murmur, No Rubs, No Gallops  Abdomen: Soft, Non distended, Non tender. +Bowel sounds,   Extremities: No c/c/e  Psych:   Not anxious or agitated. Neurologic:  No acute neurological deficit. Labs: Results:       Chemistry Recent Labs     02/28/23  0630 03/01/23  0619    138   K 4.0 4.1    109   CO2 28 27   BUN 16 20*   GLOB 3.4  --       CBC w/Diff Recent Labs     02/28/23  0630 03/01/23  0619   WBC 6.0 5.1   RBC 4.97 4.89   HGB 13.7 13.5   HCT 42.4 42.4    182      Cardiac Enzymes No results for input(s): CPK, THELMA in the last 72 hours. Invalid input(s): Trip [address]9, [facility], [facility]   Coagulation Recent Labs     02/28/23  1751   INR 1.2       Lipid Panel No results found for: CHOL, CHOLPOCT, CHOLX, CHLST, CHOLV, 240575, HDL, HDLC, LDL, LDLC, 439966, VLDLC, VLDL, TGLX, TRIGL   BNP Invalid input(s): BNPP   Liver Enzymes No results for input(s): TP, ALB in the last 72 hours.     Invalid input(s): TBIL, AP, SGOT, GPT, DBIL   Thyroid Studies No results found for: T4, TSH     Procedures/imaging: see electronic medical records for all procedures/Xrays and details which were not copied into this note but were reviewed prior to creation of Plan    CT HEAD WO CONTRAST    Result Date: 3/1/2023  EXAM: CT HEAD WO CONTRAST INDICATION: sleepy COMPARISON: None. CONTRAST: None. TECHNIQUE: Unenhanced CT of the head was performed using 5 mm images. Brain and bone windows were generated. Coronal and sagittal reformats. CT dose reduction was achieved through use of a standardized protocol tailored for this examination and automatic exposure control for dose modulation. FINDINGS: The bone windows demonstrate no abnormalities. The visualized portions of the paranasal sinuses and mastoid air cells are clear. The ventricles and sulci are age-appropriate and symmetric. Jessica Delgado The basilar cisterns are open. There is no intracranial hemorrhage, extra-axial collection, or mass effect. No CT evidence of acute infarct. No acute intracranial findings. CTA CHEST W WO CONTRAST    Result Date: 3/1/2023  EXAM:  CTA CHEST W WO CONTRAST INDICATION: Hypotension, shortness of breath, rule out PE COMPARISON: None. TECHNIQUE: Helical thin section chest CT following uneventful intravenous administration of nonionic contrast 100 mL of isovue 370 according to departmental PE protocol. Coronal and sagittal reformats were performed. 3D post processing was performed. CT dose reduction was achieved through the use of a standardized protocol tailored for this examination and automatic exposure control for dose modulation. FINDINGS: This is a suboptimal quality study for the evaluation of pulmonary embolism to the lobar arterial level. There is no pulmonary embolism to this level. THYROID: No nodule. MEDIASTINUM: No mass or lymphadenopathy. MAL: No mass or lymphadenopathy. THORACIC AORTA: No aneurysm. HEART: Moderately enlarged in size. ESOPHAGUS: No wall thickening or dilatation. TRACHEA/BRONCHI: Patent. PLEURA: No effusion or pneumothorax. LUNGS: No nodule, mass, or airspace disease.  UPPER ABDOMEN: Separately reported BONES: No aggressive bone lesion or fracture. 1.  Suboptimal contrast bolus. 2.  No central pulmonary emboli. Clear lungs. 3.  Moderate cardiomegaly. CT ABDOMEN PELVIS W IV CONTRAST Additional Contrast? None    Result Date: 3/1/2023  INDICATION:hypotension, looking for source of infection CT of the abdomen and pelvis is performed with 5 mm collimation. Study is performed with 100 cc of nonionic Isovue 370. Sagittal and coronal reformatted images were also performed. CT dose reduction was achieved with the use of the standardized protocol tailored for this examination and automatic exposure control for dose modulation. Direct comparison is made to prior CT of the chest, including the upper abdomen, dated November 2022. Findings: Lung bases: Visualized lung bases are clear. Visualized heart is enlarged. Liver: The liver is normal. Adrenals: Adrenal glands are normal. Pancreas: The pancreas is normal. Gallbladder: The gallbladder is normal. Kidneys: There is no hydronephrosis. There is a 6.3 cm x 6.4 cm mildly hypodense left renal lesion containing at least one thin septation. Spleen: The spleen is normal. Lymph nodes. There is no shahzad hepatitis, mesenteric, retroperitoneal or pelvic lymphadenopathy. Bowel: No thickened or dilated loop of large or small bowel is visualized. Appendix: The appendix is normal. Urinary bladder: Urinary bladder is partially filled and grossly normal. Miscellaneous: There is no free intraperitoneal fluid or gas. There is no focal fluid collection to suggest abscess. 1. No bowel obstruction, ileus or perforation. No intra-abdominal abscess. 2. 6.3 cm x 6.4 cm mildly hypointense left renal lesion containing at least one thin septation.  Recommend dedicated renal mass CT or MRI for further    XR CHEST PORTABLE    Result Date: 2/28/2023  INDICATION: Shortness of Breath EXAM:  AP CHEST RADIOGRAPH COMPARISON: January 16, 2023 FINDINGS: AP portable view of the chest demonstrates a normal cardiomediastinal silhouette. Diffuse interstitial and airspace opacities. No pleural effusion or pneumothorax. The osseous structures are unremarkable. Diffuse interstitial and airspace opacities may represent pulmonary edema and/or pneumonia. Transthoracic echocardiogram (TTE) complete with contrast, bubble, strain, and 3D PRN    Result Date: 2/28/2023    Left Ventricle: Severely reduced left ventricular systolic function with a visually estimated EF of 15 - 20%. Left ventricle is dilated. Normal wall thickness. Severe global hypokinesis present. Grade III diastolic dysfunction with increased LAP. Right Ventricle: Not well visualized. Right ventricle is mildly dilated. Mildly reduced systolic function. Left Atrium: Left atrium is moderately dilated. Right Atrium: Right atrium is mildly dilated. Mitral Valve: Thickened leaflet. Severe regurgitation. Tricuspid Valve: Moderate regurgitation. The estimated RVSP is 56 mmHg.        Martinez Barbour MD

## 2023-03-01 NOTE — PROGRESS NOTES
Case Management Assessment  Initial Evaluation    Date/Time of Evaluation: 3/1/2023 3:14 PM  Assessment Completed by: Nimisha Mensah    If patient is discharged prior to next notation, then this note serves as note for discharge by case management. Patient Name: Alona Sanches                   YOB: 1970  Diagnosis: Other chest pain [R07.89]  Arrhythmia [I49.9]  Acute pulmonary edema ([facility] [address] [address] [J81.0]  Other cardiac arrhythmia [I49.8]  Acute on chronic congestive heart failure, unspecified heart failure type [facility]) [I50.9]                   Date / Time: 2/28/2023  6:13 AM    Patient Admission Status: Inpatient   Readmission Risk (Low < 19, Mod (19-27), High > 27): Readmission Risk Score: 11.6    Current PCP: None None  PCP verified by CM? Yes    Chart Reviewed: Yes      History Provided by: Patient  Patient Orientation: Alert and Oriented    Patient Cognition: Alert    Hospitalization in the last 30 days (Readmission):  No    If yes, Readmission Assessment in CM Navigator will be completed. Advance Directives:      Code Status: Full Code   Patient's Primary Decision Maker is:        Discharge Planning:    Patient lives with: Family Members Type of Home: Other (Comment)  Primary Care Giver: Self  Patient Support Systems include: Family Members   Current Financial resources: Medicaid  Current community resources: Health Education  Current services prior to admission: None            Current DME:              Type of Home Care services:  None    ADLS  Prior functional level: Independent in ADLs/IADLs  Current functional level: Independent in ADLs/IADLs    PT AM-PAC:   /24  OT AM-PAC:   /24    Family can provide assistance at DC: Yes  Would you like Case Management to discuss the discharge plan with any other family members/significant others, and if so, who?  No  Plans to Return to Present Housing: Yes  Other Identified Issues/Barriers to RETURNING to current housing: n/a  Potential Assistance needed at discharge: N/A            Potential DME:    Patient expects to discharge to: [address] [place] [address] for transportation at discharge: Family    Financial    Payor: /     Does insurance require precert for SNF: No    Potential assistance Purchasing Medications: No  Meds-to-Beds request: Yes      Chrissy Margareth [address] [place], [place] - [address] [facility] [address] [phone] [address] [facility] [address]  Phone: [phone] Fax: [phone]      Notes:    Factors facilitating achievement of predicted outcomes: n/a    Barriers to discharge: Limited insight into deficits    Additional Case Management Notes: Patient admitted from home. Patient stated that he lives with family and was independent prior to admission. Patient stated that he felt like he could not breathe prior to admission and stated that he was \"scared\". Patient admitted not understanding well what the medical plan was going to be and stated that he has a hard time reading and writing. Patient did have an apt today with [address], which CMA to assist with rescheduling. Patient to have a CT scan as well as to be followed by cardiology. Patient aware that he will be in acute setting for several days. SW to follow. The Plan for Transition of Care is related to the following treatment goals of Other chest pain [R07.89]  Arrhythmia [I49.9]  Acute pulmonary edema (HCC) [J81.0]  Other cardiac arrhythmia [I49.8]  Acute on chronic congestive heart failure, unspecified heart failure type ([facility] [address] [N10.6]    IF APPLICABLE: The Patient and/or patient representative Tere Ang and his family were provided with a choice of provider and agrees with the discharge plan.  Freedom of choice list with basic dialogue that supports the patient's individualized plan of care/goals and shares the quality data associated with the providers was provided to: Patient   Patient Representative Name:       The Patient and/or Patient Representative Agree with the Discharge Plan?  Yes    Radha Staff  Case Management Department  Ph: [phone] Fax: n/a

## 2023-03-01 NOTE — PROGRESS NOTES
Accompanied pt to radiology for ordered CT Scans and back to his room. Pt experienced some anxiet Pt continues to deny pain, but does complain of difficulty breathing when he lies down. He has been more comfortable sleeping in the chair. His 02 level has been within normal limits.

## 2023-03-02 ENCOUNTER — APPOINTMENT (OUTPATIENT)
Facility: HOSPITAL | Age: 53
DRG: 194 | End: 2023-03-02
Payer: MEDICAID

## 2023-03-02 LAB
AMMONIA PLAS-SCNC: 54 UMOL/L (ref 11–32)
ANION GAP SERPL CALC-SCNC: 4 MMOL/L (ref 3–18)
BUN SERPL-MCNC: 22 MG/DL (ref 7–18)
BUN/CREAT SERPL: 23 (ref 12–20)
CALCIUM SERPL-MCNC: 10.2 MG/DL (ref 8.5–10.1)
CHLORIDE SERPL-SCNC: 110 MMOL/L (ref 100–111)
CO2 SERPL-SCNC: 27 MMOL/L (ref 21–32)
CREAT SERPL-MCNC: 0.94 MG/DL (ref 0.6–1.3)
GLUCOSE BLD STRIP.AUTO-MCNC: 104 MG/DL (ref 70–110)
GLUCOSE BLD STRIP.AUTO-MCNC: 117 MG/DL (ref 70–110)
GLUCOSE BLD STRIP.AUTO-MCNC: 157 MG/DL (ref 70–110)
GLUCOSE BLD STRIP.AUTO-MCNC: 195 MG/DL (ref 70–110)
GLUCOSE SERPL-MCNC: 90 MG/DL (ref 74–99)
MAGNESIUM SERPL-MCNC: 2.2 MG/DL (ref 1.6–2.6)
POTASSIUM SERPL-SCNC: 4.1 MMOL/L (ref 3.5–5.5)
SODIUM SERPL-SCNC: 141 MMOL/L (ref 136–145)

## 2023-03-02 PROCEDURE — 6360000004 HC RX CONTRAST MEDICATION: Performed by: HOSPITALIST

## 2023-03-02 PROCEDURE — 82140 ASSAY OF AMMONIA: CPT

## 2023-03-02 PROCEDURE — 2580000003 HC RX 258: Performed by: HOSPITALIST

## 2023-03-02 PROCEDURE — 36415 COLL VENOUS BLD VENIPUNCTURE: CPT

## 2023-03-02 PROCEDURE — 83735 ASSAY OF MAGNESIUM: CPT

## 2023-03-02 PROCEDURE — 80048 BASIC METABOLIC PNL TOTAL CA: CPT

## 2023-03-02 PROCEDURE — 82962 GLUCOSE BLOOD TEST: CPT

## 2023-03-02 PROCEDURE — 6360000002 HC RX W HCPCS: Performed by: HOSPITALIST

## 2023-03-02 PROCEDURE — 1100000003 HC PRIVATE W/ TELEMETRY

## 2023-03-02 PROCEDURE — 74183 MRI ABD W/O CNTR FLWD CNTR: CPT

## 2023-03-02 PROCEDURE — 6360000002 HC RX W HCPCS: Performed by: INTERNAL MEDICINE

## 2023-03-02 PROCEDURE — A9577 INJ MULTIHANCE: HCPCS | Performed by: HOSPITALIST

## 2023-03-02 PROCEDURE — 6370000000 HC RX 637 (ALT 250 FOR IP): Performed by: FAMILY MEDICINE

## 2023-03-02 PROCEDURE — 6370000000 HC RX 637 (ALT 250 FOR IP): Performed by: HOSPITALIST

## 2023-03-02 RX ORDER — DOXYCYCLINE 100 MG/1
100 CAPSULE ORAL EVERY 12 HOURS SCHEDULED
Status: DISCONTINUED | OUTPATIENT
Start: 2023-03-02 | End: 2023-03-05 | Stop reason: HOSPADM

## 2023-03-02 RX ORDER — MECOBALAMIN 5000 MCG
5 TABLET,DISINTEGRATING ORAL NIGHTLY
Status: DISCONTINUED | OUTPATIENT
Start: 2023-03-02 | End: 2023-03-05 | Stop reason: HOSPADM

## 2023-03-02 RX ORDER — DOXYCYCLINE HYCLATE 100 MG/1
100 CAPSULE ORAL EVERY 12 HOURS SCHEDULED
Status: DISCONTINUED | OUTPATIENT
Start: 2023-03-02 | End: 2023-03-02

## 2023-03-02 RX ADMIN — FUROSEMIDE 20 MG: 10 INJECTION, SOLUTION INTRAMUSCULAR; INTRAVENOUS at 08:49

## 2023-03-02 RX ADMIN — Medication 5 MG: at 21:12

## 2023-03-02 RX ADMIN — GADOBENATE DIMEGLUMINE 20 ML: 529 INJECTION, SOLUTION INTRAVENOUS at 09:33

## 2023-03-02 RX ADMIN — CEFTRIAXONE 1000 MG: 1 INJECTION, POWDER, FOR SOLUTION INTRAMUSCULAR; INTRAVENOUS at 18:20

## 2023-03-02 RX ADMIN — LACTULOSE 20 G: 20 SOLUTION ORAL at 08:49

## 2023-03-02 RX ADMIN — SODIUM CHLORIDE, PRESERVATIVE FREE 10 ML: 5 INJECTION INTRAVENOUS at 20:41

## 2023-03-02 RX ADMIN — LACTULOSE 20 G: 20 SOLUTION ORAL at 14:54

## 2023-03-02 RX ADMIN — FUROSEMIDE 20 MG: 10 INJECTION, SOLUTION INTRAMUSCULAR; INTRAVENOUS at 18:20

## 2023-03-02 RX ADMIN — SODIUM CHLORIDE, PRESERVATIVE FREE 10 ML: 5 INJECTION INTRAVENOUS at 08:48

## 2023-03-02 RX ADMIN — ENOXAPARIN SODIUM 40 MG: 100 INJECTION SUBCUTANEOUS at 08:48

## 2023-03-02 RX ADMIN — DOXYCYCLINE 100 MG: 100 CAPSULE ORAL at 14:54

## 2023-03-02 RX ADMIN — LACTULOSE 20 G: 20 SOLUTION ORAL at 20:41

## 2023-03-02 ASSESSMENT — PAIN SCALES - GENERAL: PAINLEVEL_OUTOF10: 0

## 2023-03-02 NOTE — PROGRESS NOTES
Cardiology Progress Note        Patient: Paolo Chand        Sex: male          DOA: 2/28/2023  YOB: 1970      Age:  45 y.o.        LOS:  LOS: 1 day     Patient seen and examined, chart reviewed. Assessment/Plan     Patient Active Problem List   Diagnosis    DM (diabetes mellitus) ([facility] [address]    Acute on chronic combined systolic and diastolic congestive heart failure (HCC)    CHF (congestive heart failure) (HCC)    Cardiomyopathy (HCC)    Nonrheumatic mitral valve regurgitation    Arrhythmia    Lung infiltrate    Smoker    HTN (hypertension)    HCV (hepatitis C virus)      NSVT     Telemetry monitor revealed few episodes of nonsustained ventricular tachycardia, patient was asymptomatic. Echocardiogram was done in 11/2022 reported        Left Ventricle: Reduced left ventricular systolic function with a visually estimated EF of 30 - 35%. Left ventricle is severely dilated. Normal wall thickness. Severe global hypokinesis present. Grade II diastolic dysfunction with increased LAP. Aortic Valve: Tricuspid valve. Mitral Valve: Mildly thickened leaflet, at the anterior leaflet. Mild regurgitation. Left Atrium: Left atrium is mildly dilated. Cardiac catheterization was done in November 2022 reported    Patent coronary arteries with minimal luminal irregularities  LVEDP 6 mmHg   PCWP 2 mmHg   PA 17/7 mean PA pressure 11 mmHg     Findings are consistent with nonischemic cardiomyopathy. Echocardiogram was done on 02/28/2023 revealed      Left Ventricle: Severely reduced left ventricular systolic function with a visually estimated EF of 15 - 20%. Left ventricle is dilated. Normal wall thickness. Severe global hypokinesis present. Grade III diastolic dysfunction with increased LAP. Right Ventricle: Not well visualized. Right ventricle is mildly dilated. Mildly reduced systolic function. Left Atrium: Left atrium is moderately dilated.     Right Atrium: Right atrium is mildly dilated. Mitral Valve: Thickened leaflet. Severe regurgitation. Tricuspid Valve: Moderate regurgitation. The estimated RVSP is 56 mmHg. Plan:    Continue Lasix to 20 mg IV twice a day due to borderline blood pressure. Will add beta-blocker, ACE inhibitor/ ARB and spironolactone as per heart rate/ blood pressure response. Monitor renal function and electrolytes. Keep potassium level around 4 and magnesium level around 2   Input output  Salt restriction up to 2 g per day and fluid restriction up to 1.2-1.4 L per day. Strongly advised about medication adherence            Subjective:    cc: My breathing is little better today      REVIEW OF SYSTEMS:     General: No fevers or chills. Cardiovascular:  positive shortness of breath on exertion, No chest pain,No palpitations, No orthopnea, No PND, No leg swelling, No claudication  Pulmonary: No shortness of breath. Gastrointestinal: No nausea, vomiting, bleeding  Neurology: No Dizziness    Objective:      Visit Vitals  BP 92/63   Pulse 85   Temp 98.1 °F (36.7 °C) (Oral)   Resp 20   Ht 6' (1.829 m)   Wt 213 lb 13.5 oz (97 kg)   SpO2 100%   BMI 29.00 kg/m²     Body mass index is 29 kg/m². Physical Exam:  General Appearance: Comfortable, not using accessory muscles of respiration. HEENT: TYESHA. HEAD: Atraumatic  NECK: No JVD, no thyroidomeglay. CAROTIDS:  LUNGS:  bibasilar crepitations   HEART: S1+S2 audible, no murmur, no pericardial rub. ABD: Non-tender, BS Audible    EXT: No edema, and no cyanosis. VASCULAR EXAM: Pulses are intact. PSYCHIATRIC EXAM: Mood is appropriate. MUSCULOSKELETAL: Grossly no joint deformity.   NEUROLOGICAL: AAO times 3, No motor and sensory deficit    Medication:  Current Facility-Administered Medications   Medication Dose Route Frequency    furosemide (LASIX) injection 20 mg  20 mg IntraVENous BID    sodium chloride flush 0.9 % injection 5-40 mL  5-40 mL IntraVENous 2 times per day    sodium chloride flush 0.9 % injection 5-40 mL  5-40 mL IntraVENous PRN    0.9 % sodium chloride infusion   IntraVENous PRN    enoxaparin (LOVENOX) injection 40 mg  40 mg SubCUTAneous Daily    ondansetron (ZOFRAN-ODT) disintegrating tablet 4 mg  4 mg Oral Q8H PRN    Or    ondansetron (ZOFRAN) injection 4 mg  4 mg IntraVENous Q6H PRN    polyethylene glycol (GLYCOLAX) packet 17 g  17 g Oral Daily PRN    acetaminophen (TYLENOL) tablet 650 mg  650 mg Oral Q6H PRN    Or    acetaminophen (TYLENOL) suppository 650 mg  650 mg Rectal Q6H PRN    [Held by provider] carvedilol (COREG) tablet 3.125 mg  3.125 mg Oral BID WC    glucose chewable tablet 16 g  4 tablet Oral PRN    dextrose bolus 10% 125 mL  125 mL IntraVENous PRN    Or    dextrose bolus 10% 250 mL  250 mL IntraVENous PRN    glucagon (rDNA) injection 1 mg  1 mg SubCUTAneous PRN    dextrose 10 % infusion   IntraVENous Continuous PRN    insulin lispro (HUMALOG) injection vial 0-4 Units  0-4 Units SubCUTAneous TID WC    insulin lispro (HUMALOG) injection vial 0-4 Units  0-4 Units SubCUTAneous Nightly    nicotine (NICODERM CQ) 14 MG/24HR 1 patch  1 patch TransDERmal Daily    cefTRIAXone (ROCEPHIN) 1,000 mg in sodium chloride 0.9 % 50 mL IVPB (mini-bag)  1,000 mg IntraVENous Q24H    doxycycline (VIBRAMYCIN) 100 mg in sodium chloride 0.9 % 100 mL IVPB (mini-bag)  100 mg IntraVENous Q12H    [Held by provider] potassium chloride (KLOR-CON M) extended release tablet 20 mEq  20 mEq Oral BID WC    lactulose (CHRONULAC) 10 GM/15ML solution 20 g  20 g Oral TID               Lab/Data Reviewed:       Recent Labs     02/28/23  0630 03/01/23  0619   WBC 6.0 5.1   HGB 13.7 13.5   HCT 42.4 42.4    182     Recent Labs     02/28/23  0630 03/01/23  0619    138   K 4.0 4.1    109   CO2 28 27   BUN 16 20*       Signed By: Terence Hedrick MD     March 1, 2023

## 2023-03-02 NOTE — PROGRESS NOTES
Bedside report given. Whiteboard updated. Pt interactive in bedside report and voices no concerns at this time. Pt sitting on side of bed. He denies any shortness of breath. He appears calm. Call bell within reach.

## 2023-03-02 NOTE — ACP (ADVANCE CARE PLANNING)
Palliative Care Consult: The Palliative Care team received a new consult for this patient. The Palliative Care team will conduct a chart review, at this time; and will assess patient and reach out to family at a later time. Thanks for the Palliative consult and allowing the Palliative Care team to assist with caring for this patient, during this hospital stay. Radha Peoples., MSW  Palliative Care   LY#[phone]

## 2023-03-02 NOTE — PROGRESS NOTES
Hospitalist Progress Note-critical care note     Patient: Bob Kidd MRN: [id number]  CSN: [id number]    YOB: 1970  Age: 45 y.o. Sex: male    DOA: 2/28/2023 LOS:  LOS: 2 days            Chief complaint: acute on chronic chf, smoker , renal lesion, cardiomyopathy     Assessment/Plan         Active Hospital Problems    Diagnosis Date Noted    HCV (hepatitis C virus) [B19.20] 03/01/2023     Priority: Medium    Arrhythmia [I49.9] 02/28/2023     Priority: Medium    Lung infiltrate [R91.8] 02/28/2023     Priority: Medium    Smoker [F17.200] 02/28/2023     Priority: Medium    HTN (hypertension) [I10] 02/28/2023     Priority: Medium    DM (diabetes mellitus) ([facility] [address] [E11.9] 11/28/2022    Acute on chronic combined systolic and diastolic congestive heart failure ([address] [I50.43] 11/27/2022    Cardiomyopathy ([facility] [address] [I42.9] 11/27/2022    Nonrheumatic mitral valve regurgitation [I34.0] 11/27/2022       Acute on chronic combined diastolic and systolic heart failure  Repeated echo with ef 15-20 %  Dr. Dawson Rodriges following   Continue low salt diet and fluid restriction      Arrhythmia  PVC from cardiac monitor, non- SVT 7 beats in ER   Cardiac monitor optimize electrolytes  Echo, check TSH  Urine drug screen negative     Lung infiltration  Rocephin doxycycline, symptom treatment     Cardiomyopathy/MVR     Smoker  Continue educate patient nicotine patch      Hcv   Found out one week ago-will see gi per pcp referral -recommend to reschedule appointment asap     6.3 cm x 6.4 cm mildly hypointense left renal lesion from ct   Mri still pending results     Subjective I do not read and not write, I have a daughter, she possible will be back her tomorrow,     Ct results and echo discussed with pt again, he stated he only have a daughter, he never went to school. He does not want me to call her daughter today \" my daughter to  my grandchild, I do not want to be bothered\"   All questions have been answered.  35 total min's spent on patient care including >50% on counseling/coordinating care. Discussed the above assessments. also discussed labs, medications and hospital course      Disposition :tbd,   Review of systems:    General: No fevers or chills. Cardiovascular: No chest pain or pressure. No palpitations. Pulmonary: +shortness of breath. Gastrointestinal: No nausea, vomiting. Vital signs/Intake and Output:  Visit Vitals  BP (!) 86/53   Pulse (!) 101   Temp 98.4 °F (36.9 °C) (Oral)   Resp 18   Ht 6' (1.829 m)   Wt 216 lb 0.8 oz (98 kg)   SpO2 100%   BMI 29.30 kg/m²     Current Shift:  No intake/output data recorded. Last three shifts:  02/28 1901 - 03/02 0700  In: 480 [P.O.:480]  Out: -     Physical Exam:  General: WD, WN. Alert, cooperative, no acute distress    HEENT: NC, Atraumatic. PERRLA, anicteric sclerae. Lungs: CTA Bilaterally. No Wheezing/Rhonchi/Rales. Heart:  Regular  rhythm,  No murmur, No Rubs, No Gallops  Abdomen: Soft, Non distended, Non tender. +Bowel sounds,   Extremities: No c/c/e  Psych:   Not anxious or agitated. Neurologic:  No acute neurological deficit. Labs: Results:       Chemistry Recent Labs     02/28/23  0630 03/01/23  0619 03/02/23  0234    138 141   K 4.0 4.1 4.1    109 110   CO2 28 27 27   BUN 16 20* 22*   GLOB 3.4  --   --         CBC w/Diff Recent Labs     02/28/23  0630 03/01/23  0619   WBC 6.0 5.1   RBC 4.97 4.89   HGB 13.7 13.5   HCT 42.4 42.4    182        Cardiac Enzymes No results for input(s): CPK, THELMA in the last 72 hours. Invalid input(s): Trip 1019, [facility], [facility]   Coagulation Recent Labs     02/28/23  1751   INR 1.2         Lipid Panel No results found for: CHOL, CHOLPOCT, CHOLX, CHLST, CHOLV, 434357, HDL, HDLC, LDL, LDLC, 094522, VLDLC, VLDL, TGLX, TRIGL   BNP Invalid input(s): BNPP   Liver Enzymes No results for input(s): TP, ALB in the last 72 hours.     Invalid input(s): TBIL, AP, SGOT, GPT, DBIL   Thyroid Studies No results found for: T4, TSH Procedures/imaging: see electronic medical records for all procedures/Xrays and details which were not copied into this note but were reviewed prior to creation of Plan    CT HEAD WO CONTRAST    Result Date: 3/1/2023  EXAM: CT HEAD WO CONTRAST INDICATION: sleepy COMPARISON: None. CONTRAST: None. TECHNIQUE: Unenhanced CT of the head was performed using 5 mm images. Brain and bone windows were generated. Coronal and sagittal reformats. CT dose reduction was achieved through use of a standardized protocol tailored for this examination and automatic exposure control for dose modulation. FINDINGS: The bone windows demonstrate no abnormalities. The visualized portions of the paranasal sinuses and mastoid air cells are clear. The ventricles and sulci are age-appropriate and symmetric. Argenis Stands The basilar cisterns are open. There is no intracranial hemorrhage, extra-axial collection, or mass effect. No CT evidence of acute infarct. No acute intracranial findings. CTA CHEST W WO CONTRAST    Result Date: 3/1/2023  EXAM:  CTA CHEST W WO CONTRAST INDICATION: Hypotension, shortness of breath, rule out PE COMPARISON: None. TECHNIQUE: Helical thin section chest CT following uneventful intravenous administration of nonionic contrast 100 mL of isovue 370 according to departmental PE protocol. Coronal and sagittal reformats were performed. 3D post processing was performed. CT dose reduction was achieved through the use of a standardized protocol tailored for this examination and automatic exposure control for dose modulation. FINDINGS: This is a suboptimal quality study for the evaluation of pulmonary embolism to the lobar arterial level. There is no pulmonary embolism to this level. THYROID: No nodule. MEDIASTINUM: No mass or lymphadenopathy. MAL: No mass or lymphadenopathy. THORACIC AORTA: No aneurysm. HEART: Moderately enlarged in size. ESOPHAGUS: No wall thickening or dilatation. TRACHEA/BRONCHI: Patent.  PLEURA: No effusion or pneumothorax. LUNGS: No nodule, mass, or airspace disease. UPPER ABDOMEN: Separately reported BONES: No aggressive bone lesion or fracture. 1.  Suboptimal contrast bolus. 2.  No central pulmonary emboli. Clear lungs. 3.  Moderate cardiomegaly. CT ABDOMEN PELVIS W IV CONTRAST Additional Contrast? None    Result Date: 3/1/2023  INDICATION:hypotension, looking for source of infection CT of the abdomen and pelvis is performed with 5 mm collimation. Study is performed with 100 cc of nonionic Isovue 370. Sagittal and coronal reformatted images were also performed. CT dose reduction was achieved with the use of the standardized protocol tailored for this examination and automatic exposure control for dose modulation. Direct comparison is made to prior CT of the chest, including the upper abdomen, dated November 2022. Findings: Lung bases: Visualized lung bases are clear. Visualized heart is enlarged. Liver: The liver is normal. Adrenals: Adrenal glands are normal. Pancreas: The pancreas is normal. Gallbladder: The gallbladder is normal. Kidneys: There is no hydronephrosis. There is a 6.3 cm x 6.4 cm mildly hypodense left renal lesion containing at least one thin septation. Spleen: The spleen is normal. Lymph nodes. There is no shahzad hepatitis, mesenteric, retroperitoneal or pelvic lymphadenopathy. Bowel: No thickened or dilated loop of large or small bowel is visualized. Appendix: The appendix is normal. Urinary bladder: Urinary bladder is partially filled and grossly normal. Miscellaneous: There is no free intraperitoneal fluid or gas. There is no focal fluid collection to suggest abscess. 1. No bowel obstruction, ileus or perforation. No intra-abdominal abscess. 2. 6.3 cm x 6.4 cm mildly hypointense left renal lesion containing at least one thin septation.  Recommend dedicated renal mass CT or MRI for further    XR CHEST PORTABLE    Result Date: 2/28/2023  INDICATION: Shortness of Breath EXAM:  AP CHEST RADIOGRAPH COMPARISON: January 16, 2023 FINDINGS: AP portable view of the chest demonstrates a normal cardiomediastinal silhouette. Diffuse interstitial and airspace opacities. No pleural effusion or pneumothorax. The osseous structures are unremarkable. Diffuse interstitial and airspace opacities may represent pulmonary edema and/or pneumonia. Transthoracic echocardiogram (TTE) complete with contrast, bubble, strain, and 3D PRN    Result Date: 2/28/2023    Left Ventricle: Severely reduced left ventricular systolic function with a visually estimated EF of 15 - 20%. Left ventricle is dilated. Normal wall thickness. Severe global hypokinesis present. Grade III diastolic dysfunction with increased LAP. Right Ventricle: Not well visualized. Right ventricle is mildly dilated. Mildly reduced systolic function. Left Atrium: Left atrium is moderately dilated. Right Atrium: Right atrium is mildly dilated. Mitral Valve: Thickened leaflet. Severe regurgitation. Tricuspid Valve: Moderate regurgitation. The estimated RVSP is 56 mmHg.        Jose Daniel MD

## 2023-03-03 LAB
AMMONIA PLAS-SCNC: 42 UMOL/L (ref 11–32)
ANION GAP SERPL CALC-SCNC: 4 MMOL/L (ref 3–18)
BUN SERPL-MCNC: 18 MG/DL (ref 7–18)
BUN/CREAT SERPL: 20 (ref 12–20)
CALCIUM SERPL-MCNC: 10 MG/DL (ref 8.5–10.1)
CHLORIDE SERPL-SCNC: 111 MMOL/L (ref 100–111)
CO2 SERPL-SCNC: 25 MMOL/L (ref 21–32)
CREAT SERPL-MCNC: 0.88 MG/DL (ref 0.6–1.3)
GLUCOSE BLD STRIP.AUTO-MCNC: 124 MG/DL (ref 70–110)
GLUCOSE BLD STRIP.AUTO-MCNC: 134 MG/DL (ref 70–110)
GLUCOSE BLD STRIP.AUTO-MCNC: 161 MG/DL (ref 70–110)
GLUCOSE BLD STRIP.AUTO-MCNC: 92 MG/DL (ref 70–110)
GLUCOSE SERPL-MCNC: 150 MG/DL (ref 74–99)
MAGNESIUM SERPL-MCNC: 2 MG/DL (ref 1.6–2.6)
POTASSIUM SERPL-SCNC: 3.6 MMOL/L (ref 3.5–5.5)
SODIUM SERPL-SCNC: 140 MMOL/L (ref 136–145)

## 2023-03-03 PROCEDURE — 82962 GLUCOSE BLOOD TEST: CPT

## 2023-03-03 PROCEDURE — 36415 COLL VENOUS BLD VENIPUNCTURE: CPT

## 2023-03-03 PROCEDURE — 1100000003 HC PRIVATE W/ TELEMETRY

## 2023-03-03 PROCEDURE — 99221 1ST HOSP IP/OBS SF/LOW 40: CPT | Performed by: NURSE PRACTITIONER

## 2023-03-03 PROCEDURE — 6370000000 HC RX 637 (ALT 250 FOR IP): Performed by: HOSPITALIST

## 2023-03-03 PROCEDURE — 6360000002 HC RX W HCPCS: Performed by: HOSPITALIST

## 2023-03-03 PROCEDURE — 6370000000 HC RX 637 (ALT 250 FOR IP): Performed by: FAMILY MEDICINE

## 2023-03-03 PROCEDURE — 80048 BASIC METABOLIC PNL TOTAL CA: CPT

## 2023-03-03 PROCEDURE — 6370000000 HC RX 637 (ALT 250 FOR IP): Performed by: INTERNAL MEDICINE

## 2023-03-03 PROCEDURE — 6360000002 HC RX W HCPCS: Performed by: INTERNAL MEDICINE

## 2023-03-03 PROCEDURE — 2580000003 HC RX 258: Performed by: HOSPITALIST

## 2023-03-03 PROCEDURE — 83735 ASSAY OF MAGNESIUM: CPT

## 2023-03-03 PROCEDURE — 82140 ASSAY OF AMMONIA: CPT

## 2023-03-03 RX ORDER — POTASSIUM CHLORIDE 20 MEQ/1
20 TABLET, EXTENDED RELEASE ORAL ONCE
Status: COMPLETED | OUTPATIENT
Start: 2023-03-03 | End: 2023-03-03

## 2023-03-03 RX ORDER — LACTULOSE 10 G/15ML
20 SOLUTION ORAL 2 TIMES DAILY
Status: DISCONTINUED | OUTPATIENT
Start: 2023-03-03 | End: 2023-03-05 | Stop reason: HOSPADM

## 2023-03-03 RX ADMIN — LACTULOSE 20 G: 20 SOLUTION ORAL at 21:09

## 2023-03-03 RX ADMIN — METOPROLOL TARTRATE 12.5 MG: 25 TABLET, FILM COATED ORAL at 10:24

## 2023-03-03 RX ADMIN — ENOXAPARIN SODIUM 40 MG: 100 INJECTION SUBCUTANEOUS at 10:24

## 2023-03-03 RX ADMIN — SODIUM CHLORIDE, PRESERVATIVE FREE 10 ML: 5 INJECTION INTRAVENOUS at 21:09

## 2023-03-03 RX ADMIN — POTASSIUM CHLORIDE 20 MEQ: 1500 TABLET, EXTENDED RELEASE ORAL at 13:05

## 2023-03-03 RX ADMIN — METOPROLOL TARTRATE 12.5 MG: 25 TABLET, FILM COATED ORAL at 21:08

## 2023-03-03 RX ADMIN — DOXYCYCLINE 100 MG: 100 CAPSULE ORAL at 10:24

## 2023-03-03 RX ADMIN — SODIUM CHLORIDE, PRESERVATIVE FREE 10 ML: 5 INJECTION INTRAVENOUS at 10:31

## 2023-03-03 RX ADMIN — FUROSEMIDE 20 MG: 10 INJECTION, SOLUTION INTRAMUSCULAR; INTRAVENOUS at 19:01

## 2023-03-03 RX ADMIN — LACTULOSE 20 G: 20 SOLUTION ORAL at 10:23

## 2023-03-03 RX ADMIN — DOXYCYCLINE 100 MG: 100 CAPSULE ORAL at 21:08

## 2023-03-03 RX ADMIN — Medication 5 MG: at 21:08

## 2023-03-03 RX ADMIN — CEFTRIAXONE 1000 MG: 1 INJECTION, POWDER, FOR SOLUTION INTRAMUSCULAR; INTRAVENOUS at 19:01

## 2023-03-03 RX ADMIN — FUROSEMIDE 20 MG: 10 INJECTION, SOLUTION INTRAMUSCULAR; INTRAVENOUS at 10:24

## 2023-03-03 ASSESSMENT — PAIN SCALES - GENERAL: PAINLEVEL_OUTOF10: 0

## 2023-03-03 NOTE — PROGRESS NOTES
Cardiology Progress Note        Patient: Kyrie Mei        Sex: male          DOA: 2/28/2023  YOB: 1970      Age:  45 y.o.        LOS:  LOS: 2 days     Patient seen and examined, chart reviewed. Assessment/Plan     Patient Active Problem List   Diagnosis    DM (diabetes mellitus) ([facility] [address]    Acute on chronic combined systolic and diastolic congestive heart failure (HCC)    CHF (congestive heart failure) (HCC)    Cardiomyopathy (HCC)    Nonrheumatic mitral valve regurgitation    Arrhythmia    Lung infiltrate    Smoker    HTN (hypertension)    HCV (hepatitis C virus)      NSVT     Telemetry monitor revealed few episodes of nonsustained ventricular tachycardia, patient was asymptomatic. Echocardiogram was done in 11/2022 reported        Left Ventricle: Reduced left ventricular systolic function with a visually estimated EF of 30 - 35%. Left ventricle is severely dilated. Normal wall thickness. Severe global hypokinesis present. Grade II diastolic dysfunction with increased LAP. Aortic Valve: Tricuspid valve. Mitral Valve: Mildly thickened leaflet, at the anterior leaflet. Mild regurgitation. Left Atrium: Left atrium is mildly dilated. Cardiac catheterization was done in November 2022 reported    Patent coronary arteries with minimal luminal irregularities  LVEDP 6 mmHg   PCWP 2 mmHg   PA 17/7 mean PA pressure 11 mmHg     Findings are consistent with nonischemic cardiomyopathy. Echocardiogram was done on 02/28/2023 revealed      Left Ventricle: Severely reduced left ventricular systolic function with a visually estimated EF of 15 - 20%. Left ventricle is dilated. Normal wall thickness. Severe global hypokinesis present. Grade III diastolic dysfunction with increased LAP. Right Ventricle: Not well visualized. Right ventricle is mildly dilated. Mildly reduced systolic function. Left Atrium: Left atrium is moderately dilated.     Right Atrium: Right atrium is mildly dilated. Mitral Valve: Thickened leaflet. Severe regurgitation. Tricuspid Valve: Moderate regurgitation. The estimated RVSP is 56 mmHg. Plan:    Continue Lasix to 20 mg IV twice a day due to borderline blood pressure. Start Metoprolol tartrate 12.5 mg twice a day  Will add ACE inhibitor/ ARB and spironolactone as per heart rate/ blood pressure response. Monitor renal function and electrolytes. Keep potassium level around 4 and magnesium level around 2   Input output  Salt restriction up to 2 g per day and fluid restriction up to 1.2-1.4 L per day. Strongly advised about medication adherence            Subjective:    cc: My breathing is little better today, complaining of diarrhea      REVIEW OF SYSTEMS:     General: No fevers or chills. Cardiovascular:  positive shortness of breath on exertion, No chest pain,No palpitations, No orthopnea, No PND, No leg swelling, No claudication  Pulmonary: No dyspnea. Gastrointestinal: Positive diarrhea, No nausea, vomiting, bleeding  Neurology: No Dizziness    Objective:      Visit Vitals  BP 93/67   Pulse (!) 105   Temp 98.2 °F (36.8 °C) (Oral)   Resp 18   Ht 6' (1.829 m)   Wt 216 lb 0.8 oz (98 kg)   SpO2 99%   BMI 29.30 kg/m²     Body mass index is 29.3 kg/m². Physical Exam:  General Appearance: Comfortable, not using accessory muscles of respiration. HEENT: TYESHA. HEAD: Atraumatic  NECK: No JVD, no thyroidomeglay. CAROTIDS: No bruit  LUNGS:  bibasilar crepitations   HEART: S1+S2 audible, no murmur, no pericardial rub. ABD: Non-tender, BS Audible    EXT: No edema, and no cyanosis. VASCULAR EXAM: Pulses are intact. PSYCHIATRIC EXAM: Mood is appropriate. MUSCULOSKELETAL: Grossly no joint deformity.   NEUROLOGICAL: AAO times 3, No motor and sensory deficit    Medication:  Current Facility-Administered Medications   Medication Dose Route Frequency    doxycycline monohydrate (MONODOX) capsule 100 mg  100 mg Oral 2 times per day [START ON 3/3/2023] metoprolol tartrate (LOPRESSOR) tablet 12.5 mg  12.5 mg Oral BID    furosemide (LASIX) injection 20 mg  20 mg IntraVENous BID    sodium chloride flush 0.9 % injection 5-40 mL  5-40 mL IntraVENous 2 times per day    sodium chloride flush 0.9 % injection 5-40 mL  5-40 mL IntraVENous PRN    0.9 % sodium chloride infusion   IntraVENous PRN    enoxaparin (LOVENOX) injection 40 mg  40 mg SubCUTAneous Daily    ondansetron (ZOFRAN-ODT) disintegrating tablet 4 mg  4 mg Oral Q8H PRN    Or    ondansetron (ZOFRAN) injection 4 mg  4 mg IntraVENous Q6H PRN    polyethylene glycol (GLYCOLAX) packet 17 g  17 g Oral Daily PRN    acetaminophen (TYLENOL) tablet 650 mg  650 mg Oral Q6H PRN    Or    acetaminophen (TYLENOL) suppository 650 mg  650 mg Rectal Q6H PRN    glucose chewable tablet 16 g  4 tablet Oral PRN    dextrose bolus 10% 125 mL  125 mL IntraVENous PRN    Or    dextrose bolus 10% 250 mL  250 mL IntraVENous PRN    glucagon (rDNA) injection 1 mg  1 mg SubCUTAneous PRN    dextrose 10 % infusion   IntraVENous Continuous PRN    insulin lispro (HUMALOG) injection vial 0-4 Units  0-4 Units SubCUTAneous TID WC    insulin lispro (HUMALOG) injection vial 0-4 Units  0-4 Units SubCUTAneous Nightly    nicotine (NICODERM CQ) 14 MG/24HR 1 patch  1 patch TransDERmal Daily    cefTRIAXone (ROCEPHIN) 1,000 mg in sodium chloride 0.9 % 50 mL IVPB (mini-bag)  1,000 mg IntraVENous Q24H    [Held by provider] potassium chloride (KLOR-CON M) extended release tablet 20 mEq  20 mEq Oral BID WC    lactulose (CHRONULAC) 10 GM/15ML solution 20 g  20 g Oral TID               Lab/Data Reviewed:       Recent Labs     02/28/23  0630 03/01/23  0619   WBC 6.0 5.1   HGB 13.7 13.5   HCT 42.4 42.4    182       Recent Labs     02/28/23  0630 03/01/23  0619 03/02/23  0234    138 141   K 4.0 4.1 4.1    109 110   CO2 28 27 27   BUN 16 20* 22*         Signed By: Kennedy Forte MD     March 2, 2023

## 2023-03-03 NOTE — CONSULTS
Palliative Medicine Consult    Patient Name: Cecilia Adair  YOB: 1970    Date of Initial Consult: 3/3/2023  Reason for Consult: Goals of care discussions  Requesting Provider: Dr. Raf Rojas  Primary Care Physician: None None      SUMMARY:   Cecilia Adair is a 45 y. o. with a past history of diabetes mellitus, hypertension, CHF, active smoker, and recent diagnosis of HCV, who was admitted on 2/28/2023 from home with a diagnosis of acute on chronic CHF, cardiomyopathy, and lung infiltrate. Current medical issues leading to Palliative Medicine involvement include: Goals of care discussions in the setting of chronic comorbidities. PALLIATIVE DIAGNOSES:   Goals of care discussions  Advance care planning  Acute on chronic CHF/cardiomyopathy  Debility       PLAN:   Goals of care discussions: Palliative medicine team including  CHARLENE Denton and I met with patient at patient's bedside. Patient is awake, alert, and oriented x4. Introduced role as palliative medicine. Patient admits that he has a poor understanding of his current health situation, states he does not understand medical terminology. Provided patient a medical update, verbalizes understanding. Discussed goals of care. Discussed the benefits and burdens of CPR in the event of cardiopulmonary arrest.  Discussed the benefits and burdens of intubation in the event of respiratory decline pre-arrest.  Patient recognizes the difficulty of these conversations, at this time patient supports full code with full interventions. Encouraged patient to continue these goals of care conversations with his daughter/RYANA, and to include his PCP. Patient agrees. Will sign off as goals of care have been established. Please re-consult should it be warranted. Thank you for the opportunity to provide care to this patient.     Advance care planning: Patient completed an advance medical directive today naming his daughter Kelli Cole as primary medical power of , did not wish to name a secondary. Patient shares with us that he has 3 children, his parents are , and his siblings are . Acute on chronic CHF/cardiomyopathy: Cardiology following, echo 2023 revealed an ejection fraction of 15 to 20%. Medication management, salt restriction, fluid restriction and medication adherence per cardiology. Debility: 47-year-old male who recently moved here from being incarcerated in [place] (per patient). States he rents a room from a friend, with plans to get his own place in the future. His daughter Evita Griffith lives in [place] but is currently moving here to [place]. There are no plans to live with daughter. Patient states his other 2 children are out of state, and not in contact with them frequently. At this time patient needs assistance with ADLs due to activity intolerance and dyspnea with minimal exertion.   Initial consult note routed to primary continuity provider  Communicated plan of care with: Palliative IDT       GOALS OF CARE / TREATMENT PREFERENCES:   [====Goals of Care====]  GOALS OF CARE: Full code with full interventions         TREATMENT PREFERENCES:   Code Status: Full Code    Advance Care Planning:  Demographics 3/1/2023   Marital Status Single                    The palliative care team has discussed with patient / health care proxy about goals of care / treatment preferences for patient.  [====Goals of Care====]         HISTORY:     History obtained from: Patient, chart    CHIEF COMPLAINT: Shortness of breath, weakness    HPI/SUBJECTIVE:    The patient is:   [x] Verbal and participatory  [] Non-participatory due to:   Oriented x4, engaged in goals of care conversations    Clinical Pain Assessment (nonverbal scale for severity on nonverbal patients):                FUNCTIONAL ASSESSMENT:     Palliative Performance Scale (PPS):60          PSYCHOSOCIAL/SPIRITUAL SCREENING:     Advance Care Planning:  Demographics 3/1/2023 Marital Status Single        Any spiritual / Voodoo concerns:  [] Yes /  [x] No    Caregiver Burnout:  [] Yes /  [] No /  [x] No Caregiver Present      Anticipatory grief assessment:   [x] Normal  / [] Maladaptive             REVIEW OF SYSTEMS:     Positive and pertinent negative findings in ROS are noted above in HPI. The following systems were [x] reviewed / [] unable to be reviewed as noted in HPI  Other findings are noted below. Systems: constitutional, ears/nose/mouth/throat, respiratory, gastrointestinal, genitourinary, musculoskeletal, integumentary, neurologic, psychiatric, endocrine. Positive findings noted below. Modified ESAS Completed by: provider                                            PHYSICAL EXAM:     From RN flowsheet:  Wt Readings from Last 3 Encounters:   03/02/23 216 lb 0.8 oz (98 kg)   03/01/23 213 lb 13.5 oz (97 kg)     Blood pressure (!) 91/50, pulse 96, temperature 98.1 °F (36.7 °C), temperature source Oral, resp. rate 20, height 6' (1.829 m), weight 216 lb 0.8 oz (98 kg), SpO2 100 %.                               Constitutional: Awake, alert, sitting on the edge of the bed, no acute distress  Eyes: pupils equal, anicteric  ENMT: no nasal discharge, moist mucous membranes  Cardiovascular: regular rhythm, distal pulses intact  Respiratory: breathing not labored, symmetric, on room air  Gastrointestinal: soft non-tender   Musculoskeletal: no deformity, no tenderness to palpation  Skin: warm, dry  Neurologic: following commands, moving all extremities, oriented x4  Psychiatric: Flat affect, no hallucinations       HISTORY:     Principal Problem:    Acute on chronic combined systolic and diastolic congestive heart failure (HCC)  Active Problems:    Arrhythmia    Lung infiltrate    Smoker    HTN (hypertension)    HCV (hepatitis C virus)    DM (diabetes mellitus) ([facility] [address]    Cardiomyopathy ([facility] [address]    Nonrheumatic mitral valve regurgitation  Resolved Problems:    * No resolved hospital problems. *    Past Medical History:   Diagnosis Date    Diabetes ([address]       No past surgical history on file. Family History   Problem Relation Age of Onset    Cancer Mother     Alcohol Abuse Father       History reviewed, no pertinent family history.   Social History     Tobacco Use    Smoking status: Every Day     Packs/day: 1.00     Types: Cigarettes    Smokeless tobacco: Never   Substance Use Topics    Alcohol use: Yes     No Known Allergies   Current Facility-Administered Medications   Medication Dose Route Frequency    lactulose (CHRONULAC) 10 GM/15ML solution 20 g  20 g Oral BID    doxycycline monohydrate (MONODOX) capsule 100 mg  100 mg Oral 2 times per day    metoprolol tartrate (LOPRESSOR) tablet 12.5 mg  12.5 mg Oral BID    melatonin disintegrating tablet 5 mg  5 mg Oral Nightly    furosemide (LASIX) injection 20 mg  20 mg IntraVENous BID    sodium chloride flush 0.9 % injection 5-40 mL  5-40 mL IntraVENous 2 times per day    sodium chloride flush 0.9 % injection 5-40 mL  5-40 mL IntraVENous PRN    0.9 % sodium chloride infusion   IntraVENous PRN    enoxaparin (LOVENOX) injection 40 mg  40 mg SubCUTAneous Daily    ondansetron (ZOFRAN-ODT) disintegrating tablet 4 mg  4 mg Oral Q8H PRN    Or    ondansetron (ZOFRAN) injection 4 mg  4 mg IntraVENous Q6H PRN    polyethylene glycol (GLYCOLAX) packet 17 g  17 g Oral Daily PRN    acetaminophen (TYLENOL) tablet 650 mg  650 mg Oral Q6H PRN    Or    acetaminophen (TYLENOL) suppository 650 mg  650 mg Rectal Q6H PRN    glucose chewable tablet 16 g  4 tablet Oral PRN    dextrose bolus 10% 125 mL  125 mL IntraVENous PRN    Or    dextrose bolus 10% 250 mL  250 mL IntraVENous PRN    glucagon (rDNA) injection 1 mg  1 mg SubCUTAneous PRN    dextrose 10 % infusion   IntraVENous Continuous PRN    insulin lispro (HUMALOG) injection vial 0-4 Units  0-4 Units SubCUTAneous TID WC    insulin lispro (HUMALOG) injection vial 0-4 Units  0-4 Units SubCUTAneous Nightly    nicotine (NICODERM CQ) 14 MG/24HR 1 patch  1 patch TransDERmal Daily    cefTRIAXone (ROCEPHIN) 1,000 mg in sodium chloride 0.9 % 50 mL IVPB (mini-bag)  1,000 mg IntraVENous Q24H          LAB AND IMAGING FINDINGS:     Lab Results   Component Value Date/Time    WBC 5.1 03/01/2023 06:19 AM    HGB 13.5 03/01/2023 06:19 AM     03/01/2023 06:19 AM     Lab Results   Component Value Date/Time     03/03/2023 06:48 AM    K 3.6 03/03/2023 06:48 AM     03/03/2023 06:48 AM    CO2 25 03/03/2023 06:48 AM    BUN 18 03/03/2023 06:48 AM    MG 2.0 03/03/2023 06:48 AM      Lab Results   Component Value Date/Time    GLOB 3.4 02/28/2023 06:30 AM     Lab Results   Component Value Date/Time    INR 1.2 02/28/2023 05:51 PM      No results found for: IRON, TIBC, IBCT, FERR   No results found for: PH, PCO2, PO2  No components found for: GLPOC   No results found for: CPK, CKMB, TROPONINI             Total time: 40 minutes    Prolonged service was provided for  []30 min   []75 min in face to face time in the presence of the patient, spent as noted above.   Time Start:   Time End:

## 2023-03-03 NOTE — PROGRESS NOTES
Hospitalist Progress Note-critical care note     Patient: Alysha Gillette MRN: [id number]  [facility]: [id number]    YOB: 1970  Age: 45 y.o. Sex: male    DOA: 2/28/2023 LOS:  LOS: 3 days            Chief complaint: acute on chronic chf, smoker , renal lesion, cardiomyopathy     Assessment/Plan         Active Hospital Problems    Diagnosis Date Noted    HCV (hepatitis C virus) [B19.20] 03/01/2023     Priority: Medium    Arrhythmia [I49.9] 02/28/2023     Priority: Medium    Lung infiltrate [R91.8] 02/28/2023     Priority: Medium    Smoker [F17.200] 02/28/2023     Priority: Medium    HTN (hypertension) [I10] 02/28/2023     Priority: Medium    DM (diabetes mellitus) ([address] [E11.9] 11/28/2022    Acute on chronic combined systolic and diastolic congestive heart failure ([address] [I50.43] 11/27/2022    Cardiomyopathy ([facility] [address] [I42.9] 11/27/2022    Nonrheumatic mitral valve regurgitation [I34.0] 11/27/2022       Acute on chronic combined diastolic and systolic heart failure  Repeated echo with ef 15-20 %  Dr. Julieta Dunn following   Continue low salt diet and fluid restriction   Continue educate  pt      Arrhythmia-due to low ef   PVC from cardiac monitor, non- SVT 7 beats in ER   Cardiac monitor optimize electrolytes  Echo, check TSH  Urine drug screen negative     Lung infiltration  Rocephin doxycycline, symptom treatment     Cardiomyopathy/MVR     Smoker  Continue educate patient nicotine patch      Hcv   Found out one week ago-will see [facility] per pcp referral -recommend to reschedule appointment asap -will let him to see Dr. Mirna Barber  Will decrease lactulose     6.3 cm x 6.4 cm mildly hypointense left renal lesion from ct   Mri done recommend to repeat imaging in 6 months      Subjective my daughter will come today, I do not want to bother her a lot . My bm is  water , my breathing on and off     Will decrease lactulose, continue optimize medication ,  k replacement       All questions have been answered.  35 total min's spent on patient care including >50% on counseling/coordinating care. Discussed the above assessments. also discussed labs, medications and hospital course      Disposition :tbd,   Review of systems:    General: No fevers or chills. Cardiovascular: No chest pain or pressure. No palpitations. Pulmonary: +shortness of breath. Gastrointestinal: No nausea, vomiting. Vital signs/Intake and Output:  Visit Vitals  BP 96/69   Pulse 87   Temp 98.3 °F (36.8 °C) (Oral)   Resp 18   Ht 6' (1.829 m)   Wt 216 lb 0.8 oz (98 kg)   SpO2 100%   BMI 29.30 kg/m²     Current Shift:  No intake/output data recorded. Last three shifts:  03/01 1901 - 03/03 0700  In: 877 [P.O.:877]  Out: -     Physical Exam:  General: WD, WN. Alert, cooperative, no acute distress    HEENT: NC, Atraumatic. PERRLA, anicteric sclerae. Lungs: CTA Bilaterally. No Wheezing/Rhonchi/Rales. Heart:  Regular  rhythm,  No murmur, No Rubs, No Gallops  Abdomen: Soft, Non distended, Non tender. +Bowel sounds,   Extremities: No c/c/e  Psych:   Not anxious or agitated. Neurologic:  No acute neurological deficit. Labs: Results:       Chemistry Recent Labs     03/01/23  0619 03/02/23  0234 03/03/23  0648    141 140   K 4.1 4.1 3.6    110 111   CO2 27 27 25   BUN 20* 22* 18        CBC w/Diff Recent Labs     03/01/23  0619   WBC 5.1   RBC 4.89   HGB 13.5   HCT 42.4           Cardiac Enzymes No results for input(s): CPK, THELMA in the last 72 hours. Invalid input(s): [address] [facility]   Coagulation Recent Labs     02/28/23  1751   INR 1.2         Lipid Panel No results found for: CHOL, CHOLPOCT, CHOLX, CHLST, CHOLV, 305236, HDL, HDLC, LDL, LDLC, 233357, VLDLC, VLDL, TGLX, TRIGL   BNP Invalid input(s): BNPP   Liver Enzymes No results for input(s): TP, ALB in the last 72 hours.     Invalid input(s): TBIL, AP, SGOT, GPT, DBIL   Thyroid Studies No results found for: T4, TSH     Procedures/imaging: see electronic medical records for all procedures/Xrays and details which were not copied into this note but were reviewed prior to creation of Plan    CT HEAD WO CONTRAST    Result Date: 3/1/2023  EXAM: CT HEAD WO CONTRAST INDICATION: sleepy COMPARISON: None. CONTRAST: None. TECHNIQUE: Unenhanced CT of the head was performed using 5 mm images. Brain and bone windows were generated. Coronal and sagittal reformats. CT dose reduction was achieved through use of a standardized protocol tailored for this examination and automatic exposure control for dose modulation. FINDINGS: The bone windows demonstrate no abnormalities. The visualized portions of the paranasal sinuses and mastoid air cells are clear. The ventricles and sulci are age-appropriate and symmetric. Abdiel Chris The basilar cisterns are open. There is no intracranial hemorrhage, extra-axial collection, or mass effect. No CT evidence of acute infarct. No acute intracranial findings. CTA CHEST W WO CONTRAST    Result Date: 3/1/2023  EXAM:  CTA CHEST W WO CONTRAST INDICATION: Hypotension, shortness of breath, rule out PE COMPARISON: None. TECHNIQUE: Helical thin section chest CT following uneventful intravenous administration of nonionic contrast 100 mL of isovue 370 according to departmental PE protocol. Coronal and sagittal reformats were performed. 3D post processing was performed. CT dose reduction was achieved through the use of a standardized protocol tailored for this examination and automatic exposure control for dose modulation. FINDINGS: This is a suboptimal quality study for the evaluation of pulmonary embolism to the lobar arterial level. There is no pulmonary embolism to this level. THYROID: No nodule. MEDIASTINUM: No mass or lymphadenopathy. MAL: No mass or lymphadenopathy. THORACIC AORTA: No aneurysm. HEART: Moderately enlarged in size. ESOPHAGUS: No wall thickening or dilatation. TRACHEA/BRONCHI: Patent. PLEURA: No effusion or pneumothorax.  LUNGS: No nodule, mass, or airspace disease. UPPER ABDOMEN: Separately reported BONES: No aggressive bone lesion or fracture. 1.  Suboptimal contrast bolus. 2.  No central pulmonary emboli. Clear lungs. 3.  Moderate cardiomegaly. CT ABDOMEN PELVIS W IV CONTRAST Additional Contrast? None    Result Date: 3/1/2023  INDICATION:hypotension, looking for source of infection CT of the abdomen and pelvis is performed with 5 mm collimation. Study is performed with 100 cc of nonionic Isovue 370. Sagittal and coronal reformatted images were also performed. CT dose reduction was achieved with the use of the standardized protocol tailored for this examination and automatic exposure control for dose modulation. Direct comparison is made to prior CT of the chest, including the upper abdomen, dated November 2022. Findings: Lung bases: Visualized lung bases are clear. Visualized heart is enlarged. Liver: The liver is normal. Adrenals: Adrenal glands are normal. Pancreas: The pancreas is normal. Gallbladder: The gallbladder is normal. Kidneys: There is no hydronephrosis. There is a 6.3 cm x 6.4 cm mildly hypodense left renal lesion containing at least one thin septation. Spleen: The spleen is normal. Lymph nodes. There is no shahzad hepatitis, mesenteric, retroperitoneal or pelvic lymphadenopathy. Bowel: No thickened or dilated loop of large or small bowel is visualized. Appendix: The appendix is normal. Urinary bladder: Urinary bladder is partially filled and grossly normal. Miscellaneous: There is no free intraperitoneal fluid or gas. There is no focal fluid collection to suggest abscess. 1. No bowel obstruction, ileus or perforation. No intra-abdominal abscess. 2. 6.3 cm x 6.4 cm mildly hypointense left renal lesion containing at least one thin septation.  Recommend dedicated renal mass CT or MRI for further    XR CHEST PORTABLE    Result Date: 2/28/2023  INDICATION: Shortness of Breath EXAM:  AP CHEST RADIOGRAPH COMPARISON: January 16, 2023 FINDINGS: AP portable view of the chest demonstrates a normal cardiomediastinal silhouette. Diffuse interstitial and airspace opacities. No pleural effusion or pneumothorax. The osseous structures are unremarkable. Diffuse interstitial and airspace opacities may represent pulmonary edema and/or pneumonia. Transthoracic echocardiogram (TTE) complete with contrast, bubble, strain, and 3D PRN    Result Date: 2/28/2023    Left Ventricle: Severely reduced left ventricular systolic function with a visually estimated EF of 15 - 20%. Left ventricle is dilated. Normal wall thickness. Severe global hypokinesis present. Grade III diastolic dysfunction with increased LAP. Right Ventricle: Not well visualized. Right ventricle is mildly dilated. Mildly reduced systolic function. Left Atrium: Left atrium is moderately dilated. Right Atrium: Right atrium is mildly dilated. Mitral Valve: Thickened leaflet. Severe regurgitation. Tricuspid Valve: Moderate regurgitation. The estimated RVSP is 56 mmHg.        Nancy Wilder MD

## 2023-03-03 NOTE — ACP (ADVANCE CARE PLANNING)
Advance Care Planning     General Advance Care Planning (ACP) Conversation    Date of Conversation: 3/3/2023    Conducted with: Patient with Decision Making Capacity, patient's daughter Osman Arellano), Gerald Freeman NP (Palliative) and this writer. Healthcare Decision Maker:    Patient did not have a formal healthcare decision maker document, on file. A new advance medical directive (AMD) was completed, with patient. A copy was placed on the chart to be scanned in to the EMR. Patient was given two copies. Patient named his daughter Osman Arellano, ZK#[phone]) as his primary healthcare decision maker. Advance Medical Directive (AMD)    Primary Decision Maker (Health Care Agent): Osman Arellano  Relationship to patient: Daughter  Phone number: [phone]  [x] Named in a newly created document   [] Legal Next of Kin  [] Guardian      ACP documents you currently have include:  [x] New Advance Directive or Living Will  [] Durable Do Not Resuscitate  [] Physician Orders for Scope of Treatment (POST)  [] Medical Power of   [] Other        Content/Action Overview: This writer, along with Gerald Freeman NP, with the Palliative Care team; visited with patient to offer support and to also discuss healthcare decision makers and goals of care moving forward. Patient was sitting on the edge of the bed and alert and oriented x 4. Patient mentioned to being short of breath. He appeared to be short of breath during this visit. Patient reported that he recently moved to [place], from [place]; following a period of being incarcerated. Patient admitted to not being able to read or write and having difficulty understanding medical terminology. He also mentioned that he has problems remembering appointments. Patient reported that he rents a room from a friend. He went on to explain that his parents are  and he has no other family here. He mentioned that he has three children.      Patient's daughter Casey Masterson) is in the process of relocating to the area; however, his plan is to eventually find his own place to live on his own. When asked about who he would be okay with making decisions for him; he immediately called his daughter. The Palliative Care team spoke with the daughter, who agreed to be patient's healthcare decision maker. An AMD was completed and patient signed the AMD. He was given two copies. Patient was then asked about goals of care moving forward. Patient was educated, in layman terms, about CPR. Patient was surprised by the question; having never really though about what he would want. Patient was encouraged to talk to his daughter about this subject and let his daughter know what he would want regarding resuscitation. He is aware that without making a decision; he will remain a full code. Patient acknowledged that he would be a full code until he decided differently. At this time, patient will remain a FULL CODE WITH FULL INTERVENTION. With goals of care well defined; the Palliative Care team will sign off. Please reconsult team as needed/if appropriate. Thank you for the Palliative Medicine consult and allowing us to participate in the care of Milton Lilly. Length of Voluntary ACP Conversation in minutes:  30 minutes        Radha Torres., MSW  Palliative Care   ZT#[phone]

## 2023-03-04 LAB
ANION GAP SERPL CALC-SCNC: 3 MMOL/L (ref 3–18)
BUN SERPL-MCNC: 18 MG/DL (ref 7–18)
BUN/CREAT SERPL: 21 (ref 12–20)
CALCIUM SERPL-MCNC: 9.9 MG/DL (ref 8.5–10.1)
CHLORIDE SERPL-SCNC: 111 MMOL/L (ref 100–111)
CO2 SERPL-SCNC: 26 MMOL/L (ref 21–32)
CREAT SERPL-MCNC: 0.85 MG/DL (ref 0.6–1.3)
ERYTHROCYTE [DISTWIDTH] IN BLOOD BY AUTOMATED COUNT: 14.7 % (ref 11.6–14.5)
GLUCOSE BLD STRIP.AUTO-MCNC: 102 MG/DL (ref 70–110)
GLUCOSE BLD STRIP.AUTO-MCNC: 131 MG/DL (ref 70–110)
GLUCOSE BLD STRIP.AUTO-MCNC: 98 MG/DL (ref 70–110)
GLUCOSE SERPL-MCNC: 106 MG/DL (ref 74–99)
HCT VFR BLD AUTO: 40.6 % (ref 36–48)
HGB BLD-MCNC: 13.2 G/DL (ref 13–16)
MCH RBC QN AUTO: 28 PG (ref 24–34)
MCHC RBC AUTO-ENTMCNC: 32.5 G/DL (ref 31–37)
MCV RBC AUTO: 86.2 FL (ref 78–100)
NRBC # BLD: 0 K/UL (ref 0–0.01)
NRBC BLD-RTO: 0 PER 100 WBC
PLATELET # BLD AUTO: 203 K/UL (ref 135–420)
PMV BLD AUTO: 11.8 FL (ref 9.2–11.8)
POTASSIUM SERPL-SCNC: 4.1 MMOL/L (ref 3.5–5.5)
RBC # BLD AUTO: 4.71 M/UL (ref 4.35–5.65)
SODIUM SERPL-SCNC: 140 MMOL/L (ref 136–145)
WBC # BLD AUTO: 5.4 K/UL (ref 4.6–13.2)

## 2023-03-04 PROCEDURE — 6360000002 HC RX W HCPCS: Performed by: INTERNAL MEDICINE

## 2023-03-04 PROCEDURE — 6370000000 HC RX 637 (ALT 250 FOR IP): Performed by: HOSPITALIST

## 2023-03-04 PROCEDURE — 82962 GLUCOSE BLOOD TEST: CPT

## 2023-03-04 PROCEDURE — 85027 COMPLETE CBC AUTOMATED: CPT

## 2023-03-04 PROCEDURE — 6370000000 HC RX 637 (ALT 250 FOR IP): Performed by: INTERNAL MEDICINE

## 2023-03-04 PROCEDURE — 6370000000 HC RX 637 (ALT 250 FOR IP): Performed by: FAMILY MEDICINE

## 2023-03-04 PROCEDURE — 36415 COLL VENOUS BLD VENIPUNCTURE: CPT

## 2023-03-04 PROCEDURE — 2580000003 HC RX 258: Performed by: HOSPITALIST

## 2023-03-04 PROCEDURE — 80048 BASIC METABOLIC PNL TOTAL CA: CPT

## 2023-03-04 PROCEDURE — 6360000002 HC RX W HCPCS: Performed by: HOSPITALIST

## 2023-03-04 PROCEDURE — 1100000003 HC PRIVATE W/ TELEMETRY

## 2023-03-04 RX ADMIN — LACTULOSE 20 G: 20 SOLUTION ORAL at 10:18

## 2023-03-04 RX ADMIN — METOPROLOL TARTRATE 12.5 MG: 25 TABLET, FILM COATED ORAL at 22:16

## 2023-03-04 RX ADMIN — Medication 5 MG: at 22:17

## 2023-03-04 RX ADMIN — CEFTRIAXONE 1000 MG: 1 INJECTION, POWDER, FOR SOLUTION INTRAMUSCULAR; INTRAVENOUS at 17:40

## 2023-03-04 RX ADMIN — SODIUM CHLORIDE, PRESERVATIVE FREE 10 ML: 5 INJECTION INTRAVENOUS at 10:24

## 2023-03-04 RX ADMIN — DOXYCYCLINE 100 MG: 100 CAPSULE ORAL at 22:16

## 2023-03-04 RX ADMIN — FUROSEMIDE 20 MG: 10 INJECTION, SOLUTION INTRAMUSCULAR; INTRAVENOUS at 17:39

## 2023-03-04 RX ADMIN — DOXYCYCLINE 100 MG: 100 CAPSULE ORAL at 10:18

## 2023-03-04 RX ADMIN — METOPROLOL TARTRATE 12.5 MG: 25 TABLET, FILM COATED ORAL at 10:17

## 2023-03-04 RX ADMIN — FUROSEMIDE 20 MG: 10 INJECTION, SOLUTION INTRAMUSCULAR; INTRAVENOUS at 10:17

## 2023-03-04 NOTE — PROGRESS NOTES
Hospitalist Progress Note-critical care note     Patient: Odalis Desai MRN: [id number]  [facility]: [id number]    YOB: 1970  Age: 45 y.o. Sex: male    DOA: 2/28/2023 LOS:  LOS: 4 days            Chief complaint: acute on chronic chf, smoker , renal lesion, cardiomyopathy     Assessment/Plan     Active Hospital Problems    Diagnosis Date Noted    HCV (hepatitis C virus) [B19.20] 03/01/2023     Priority: Medium    Arrhythmia [I49.9] 02/28/2023     Priority: Medium    Lung infiltrate [R91.8] 02/28/2023     Priority: Medium    Smoker [F17.200] 02/28/2023     Priority: Medium    HTN (hypertension) [I10] 02/28/2023     Priority: Medium    DM (diabetes mellitus) ([facility] [address] [E11.9] 11/28/2022    Acute on chronic combined systolic and diastolic congestive heart failure ([address] [I50.43] 11/27/2022    Cardiomyopathy ([facility] [address] [I42.9] 11/27/2022    Nonrheumatic mitral valve regurgitation [I34.0] 11/27/2022   Check cbc     Acute on chronic combined diastolic and systolic heart failure  Repeated echo with ef 15-20 %  Dr. Diann Dias following   Continue low salt diet and fluid restriction   Continue educate  pt      Arrhythmia-due to low ef   PVC from cardiac monitor, non- SVT 7 beats in ER   Cardiac monitor optimize electrolytes  Echo, check TSH  Urine drug screen negative     Lung infiltration  Rocephin doxycycline, symptom treatment     nonischemic cardiomyopathy -  Continue Lasix to 20 mg IV twice a day due to borderline blood pressure. Continue Metoprolol tartrate 12.5 mg twice a day  Will add ACE inhibitor/ ARB and spironolactone as per heart rate/ blood pressure response. Monitor renal function and electrolytes. Keep potassium level around 4 and magnesium level around 2   Input output  Salt restriction up to 2 g per day and fluid restriction up to 1.2-1.4 L per day.       Echocardiogram was done on 02/28/2023 revealed       Left Ventricle: Severely reduced left ventricular systolic function with a visually estimated EF of 15 - 20%. Left ventricle is dilated. Normal wall thickness. Severe global hypokinesis present. Grade III diastolic dysfunction with increased LAP. Right Ventricle: Not well visualized. Right ventricle is mildly dilated. Mildly reduced systolic function. Left Atrium: Left atrium is moderately dilated. Right Atrium: Right atrium is mildly dilated. Mitral Valve: Thickened leaflet. Severe regurgitation. Tricuspid Valve: Moderate regurgitation. The estimated RVSP is 56 mmHg. Smoker  Continue educate patient nicotine patch    Hcv   Found out one week ago-will see gi per pcp referral -recommend to reschedule appointment asap -will let him to see Dr. Endy Lyn  Will decrease lactulose     6.3 cm x 6.4 cm mildly hypointense left renal lesion from ct   Mri done recommend to repeat imaging in 6 months      Subjective: pt feels much weaker today  No concrete symptoms  Wants his niece, Asif Ac to be his new MPOA and not his daughter   [phone], [phone]    Disposition :tbd,   Review of systems:    General: No fevers or chills. Cardiovascular: No chest pain or pressure. No palpitations. Pulmonary: +shortness of breath. Gastrointestinal: No nausea, vomiting. Vital signs/Intake and Output:  Visit Vitals  BP 94/64   Pulse 92   Temp 97.8 °F (36.6 °C) (Oral)   Resp 18   Ht 6' (1.829 m)   Wt 145 lb 11.6 oz (66.1 kg)   SpO2 96%   BMI 19.76 kg/m²     Current Shift:  No intake/output data recorded. Last three shifts:  No intake/output data recorded. Physical Exam:  General: WD, WN. Alert, cooperative, no acute distress    HEENT: NC, Atraumatic. PERRLA, anicteric sclerae. Lungs: CTA Bilaterally. No Wheezing/Rhonchi/Rales. Heart:  Regular  rhythm,  No murmur, No Rubs, No Gallops  Abdomen: Soft, Non distended, Non tender. +Bowel sounds,   Extremities: No c/c/e  Psych:   Not anxious or agitated. Neurologic:  No acute neurological deficit.          Labs: Results:       Chemistry Recent Labs 03/02/23  0234 03/03/23  0648 03/04/23  0251    140 140   K 4.1 3.6 4.1    111 111   CO2 27 25 26   BUN 22* 18 18        CBC w/Diff No results for input(s): WBC, RBC, HGB, HCT, PLT in the last 72 hours. Invalid input(s): GRANS, LYMPH, EOS     Cardiac Enzymes No results for input(s): CPK, THELMA in the last 72 hours. Invalid input(s): CKRMB, CKND1, TROIP   Coagulation No results for input(s): INR, APTT in the last 72 hours. Invalid input(s): PTP      Lipid Panel No results found for: CHOL, CHOLPOCT, CHOLX, CHLST, CHOLV, 815431, HDL, HDLC, LDL, LDLC, 709569, VLDLC, VLDL, TGLX, TRIGL   BNP Invalid input(s): BNPP   Liver Enzymes No results for input(s): TP, ALB in the last 72 hours. Invalid input(s): TBIL, AP, SGOT, GPT, DBIL   Thyroid Studies No results found for: T4, TSH     Procedures/imaging: see electronic medical records for all procedures/Xrays and details which were not copied into this note but were reviewed prior to creation of Plan    CT HEAD WO CONTRAST    Result Date: 3/1/2023  EXAM: CT HEAD WO CONTRAST INDICATION: sleepy COMPARISON: None. CONTRAST: None. TECHNIQUE: Unenhanced CT of the head was performed using 5 mm images. Brain and bone windows were generated. Coronal and sagittal reformats. CT dose reduction was achieved through use of a standardized protocol tailored for this examination and automatic exposure control for dose modulation. FINDINGS: The bone windows demonstrate no abnormalities. The visualized portions of the paranasal sinuses and mastoid air cells are clear. The ventricles and sulci are age-appropriate and symmetric. [employer] The basilar cisterns are open. There is no intracranial hemorrhage, extra-axial collection, or mass effect. No CT evidence of acute infarct. No acute intracranial findings. CTA CHEST W WO CONTRAST    Result Date: 3/1/2023  EXAM:  CTA CHEST W WO CONTRAST INDICATION: Hypotension, shortness of breath, rule out PE COMPARISON: None.  TECHNIQUE: Helical thin section chest CT following uneventful intravenous administration of nonionic contrast 100 mL of isovue 370 according to departmental PE protocol. Coronal and sagittal reformats were performed. 3D post processing was performed. CT dose reduction was achieved through the use of a standardized protocol tailored for this examination and automatic exposure control for dose modulation. FINDINGS: This is a suboptimal quality study for the evaluation of pulmonary embolism to the lobar arterial level. There is no pulmonary embolism to this level. THYROID: No nodule. MEDIASTINUM: No mass or lymphadenopathy. MAL: No mass or lymphadenopathy. THORACIC AORTA: No aneurysm. HEART: Moderately enlarged in size. ESOPHAGUS: No wall thickening or dilatation. TRACHEA/BRONCHI: Patent. PLEURA: No effusion or pneumothorax. LUNGS: No nodule, mass, or airspace disease. UPPER ABDOMEN: Separately reported BONES: No aggressive bone lesion or fracture. 1.  Suboptimal contrast bolus. 2.  No central pulmonary emboli. Clear lungs. 3.  Moderate cardiomegaly. CT ABDOMEN PELVIS W IV CONTRAST Additional Contrast? None    Result Date: 3/1/2023  INDICATION:hypotension, looking for source of infection CT of the abdomen and pelvis is performed with 5 mm collimation. Study is performed with 100 cc of nonionic Isovue 370. Sagittal and coronal reformatted images were also performed. CT dose reduction was achieved with the use of the standardized protocol tailored for this examination and automatic exposure control for dose modulation. Direct comparison is made to prior CT of the chest, including the upper abdomen, dated November 2022. Findings: Lung bases: Visualized lung bases are clear. Visualized heart is enlarged. Liver: The liver is normal. Adrenals: Adrenal glands are normal. Pancreas: The pancreas is normal. Gallbladder: The gallbladder is normal. Kidneys: There is no hydronephrosis.  There is a 6.3 cm x 6.4 cm mildly hypodense left renal lesion containing at least one thin septation. Spleen: The spleen is normal. Lymph nodes. There is no shahzad hepatitis, mesenteric, retroperitoneal or pelvic lymphadenopathy. Bowel: No thickened or dilated loop of large or small bowel is visualized. Appendix: The appendix is normal. Urinary bladder: Urinary bladder is partially filled and grossly normal. Miscellaneous: There is no free intraperitoneal fluid or gas. There is no focal fluid collection to suggest abscess. 1. No bowel obstruction, ileus or perforation. No intra-abdominal abscess. 2. 6.3 cm x 6.4 cm mildly hypointense left renal lesion containing at least one thin septation. Recommend dedicated renal mass CT or MRI for further    XR CHEST PORTABLE    Result Date: 2/28/2023  INDICATION: Shortness of Breath EXAM:  AP CHEST RADIOGRAPH COMPARISON: January 16, 2023 FINDINGS: AP portable view of the chest demonstrates a normal cardiomediastinal silhouette. Diffuse interstitial and airspace opacities. No pleural effusion or pneumothorax. The osseous structures are unremarkable. Diffuse interstitial and airspace opacities may represent pulmonary edema and/or pneumonia. Transthoracic echocardiogram (TTE) complete with contrast, bubble, strain, and 3D PRN    Result Date: 2/28/2023    Left Ventricle: Severely reduced left ventricular systolic function with a visually estimated EF of 15 - 20%. Left ventricle is dilated. Normal wall thickness. Severe global hypokinesis present. Grade III diastolic dysfunction with increased LAP. Right Ventricle: Not well visualized. Right ventricle is mildly dilated. Mildly reduced systolic function. Left Atrium: Left atrium is moderately dilated. Right Atrium: Right atrium is mildly dilated. Mitral Valve: Thickened leaflet. Severe regurgitation. Tricuspid Valve: Moderate regurgitation. The estimated RVSP is 56 mmHg.

## 2023-03-04 NOTE — PROGRESS NOTES
Cardiology Progress Note        Patient: Kim Ash        Sex: male          DOA: 2/28/2023  YOB: 1970      Age:  45 y.o.        LOS:  LOS: 4 days     Patient seen and examined, chart reviewed. Assessment/Plan     Patient Active Problem List   Diagnosis    DM (diabetes mellitus) ([facility]    Acute on chronic combined systolic and diastolic congestive heart failure (HCC)    CHF (congestive heart failure) (HCC)    Cardiomyopathy (HCC)    Nonrheumatic mitral valve regurgitation    Arrhythmia    Lung infiltrate    Smoker    HTN (hypertension)    HCV (hepatitis C virus)      NSVT     Telemetry monitor revealed few episodes of nonsustained ventricular tachycardia, patient was asymptomatic. Echocardiogram was done in 11/2022 reported        Left Ventricle: Reduced left ventricular systolic function with a visually estimated EF of 30 - 35%. Left ventricle is severely dilated. Normal wall thickness. Severe global hypokinesis present. Grade II diastolic dysfunction with increased LAP. Aortic Valve: Tricuspid valve. Mitral Valve: Mildly thickened leaflet, at the anterior leaflet. Mild regurgitation. Left Atrium: Left atrium is mildly dilated. Cardiac catheterization was done in November 2022 reported    Patent coronary arteries with minimal luminal irregularities  LVEDP 6 mmHg   PCWP 2 mmHg   PA 17/7 mean PA pressure 11 mmHg     Findings are consistent with nonischemic cardiomyopathy. Echocardiogram was done on 02/28/2023 revealed      Left Ventricle: Severely reduced left ventricular systolic function with a visually estimated EF of 15 - 20%. Left ventricle is dilated. Normal wall thickness. Severe global hypokinesis present. Grade III diastolic dysfunction with increased LAP. Right Ventricle: Not well visualized. Right ventricle is mildly dilated. Mildly reduced systolic function. Left Atrium: Left atrium is moderately dilated.     Right Atrium: Right atrium is mildly dilated. Mitral Valve: Thickened leaflet. Severe regurgitation. Tricuspid Valve: Moderate regurgitation. The estimated RVSP is 56 mmHg. Plan:    Continue Lasix to 20 mg IV twice a day due to borderline blood pressure. Continue Metoprolol tartrate 12.5 mg twice a day  Will add ACE inhibitor/ ARB and spironolactone as per heart rate/ blood pressure response. Monitor renal function and electrolytes. Keep potassium level around 4 and magnesium level around 2   Input output  Salt restriction up to 2 g per day and fluid restriction up to 1.2-1.4 L per day. Strongly advised about medication adherence       Call on-call cardiologist if needed on weekend. Subjective:    cc: My breathing is little better today      REVIEW OF SYSTEMS:     General: No fevers or chills. Cardiovascular:  positive shortness of breath on exertion, No chest pain,No palpitations, No orthopnea, No PND, No leg swelling, No claudication  Pulmonary: No dyspnea. Gastrointestinal: Positive diarrhea, No nausea, vomiting, bleeding  Neurology: No Dizziness    Objective:      Visit Vitals  BP (!) 89/57   Pulse 87   Temp 97.6 °F (36.4 °C) (Oral)   Resp 18   Ht 6' (1.829 m)   Wt 145 lb 11.6 oz (66.1 kg)   SpO2 100%   BMI 19.76 kg/m²     Body mass index is 19.76 kg/m². Physical Exam:  General Appearance: Comfortable, laying in bed watching phone, not using accessory muscles of respiration. HEENT: TYESHA. HEAD: Atraumatic  NECK: No JVD, no thyroidomeglay. CAROTIDS: No bruit  LUNGS:   No crepitation, no wheezing   HEART: S1+S2 audible, no murmur, no pericardial rub. ABD: Non-tender, BS Audible    EXT: No edema, and no cyanosis. VASCULAR EXAM: Pulses are intact. PSYCHIATRIC EXAM: Mood is appropriate. MUSCULOSKELETAL: Grossly no joint deformity.   NEUROLOGICAL: AAO times 3, No motor and sensory deficit    Medication:  Current Facility-Administered Medications   Medication Dose Route Frequency    lactulose (CHRONULAC) 10 GM/15ML solution 20 g  20 g Oral BID    doxycycline monohydrate (MONODOX) capsule 100 mg  100 mg Oral 2 times per day    metoprolol tartrate (LOPRESSOR) tablet 12.5 mg  12.5 mg Oral BID    melatonin disintegrating tablet 5 mg  5 mg Oral Nightly    furosemide (LASIX) injection 20 mg  20 mg IntraVENous BID    sodium chloride flush 0.9 % injection 5-40 mL  5-40 mL IntraVENous 2 times per day    sodium chloride flush 0.9 % injection 5-40 mL  5-40 mL IntraVENous PRN    0.9 % sodium chloride infusion   IntraVENous PRN    enoxaparin (LOVENOX) injection 40 mg  40 mg SubCUTAneous Daily    ondansetron (ZOFRAN-ODT) disintegrating tablet 4 mg  4 mg Oral Q8H PRN    Or    ondansetron (ZOFRAN) injection 4 mg  4 mg IntraVENous Q6H PRN    polyethylene glycol (GLYCOLAX) packet 17 g  17 g Oral Daily PRN    acetaminophen (TYLENOL) tablet 650 mg  650 mg Oral Q6H PRN    Or    acetaminophen (TYLENOL) suppository 650 mg  650 mg Rectal Q6H PRN    glucose chewable tablet 16 g  4 tablet Oral PRN    dextrose bolus 10% 125 mL  125 mL IntraVENous PRN    Or    dextrose bolus 10% 250 mL  250 mL IntraVENous PRN    glucagon (rDNA) injection 1 mg  1 mg SubCUTAneous PRN    dextrose 10 % infusion   IntraVENous Continuous PRN    insulin lispro (HUMALOG) injection vial 0-4 Units  0-4 Units SubCUTAneous TID WC    insulin lispro (HUMALOG) injection vial 0-4 Units  0-4 Units SubCUTAneous Nightly    nicotine (NICODERM CQ) 14 MG/24HR 1 patch  1 patch TransDERmal Daily    cefTRIAXone (ROCEPHIN) 1,000 mg in sodium chloride 0.9 % 50 mL IVPB (mini-bag)  1,000 mg IntraVENous Q24H               Lab/Data Reviewed:       Recent Labs     03/01/23  0619   WBC 5.1   HGB 13.5   HCT 42.4          Recent Labs     03/02/23  0234 03/03/23  0648 03/04/23  0251    140 140   K 4.1 3.6 4.1    111 111   CO2 27 25 26   BUN 22* 18 18         Signed By: Sony Kowalski MD     March 4, 2023

## 2023-03-04 NOTE — PLAN OF CARE
Problem: Discharge Planning  Goal: Discharge to home or other facility with appropriate resources  Outcome: Progressing     Problem: Pain  Goal: Verbalizes/displays adequate comfort level or baseline comfort level  Outcome: Progressing     Problem: Safety - Adult  Goal: Free from fall injury  Outcome: Progressing     Problem: Chronic Conditions and Co-morbidities  Goal: Patient's chronic conditions and co-morbidity symptoms are monitored and maintained or improved  Outcome: Progressing  Flowsheets (Taken 3/4/2023 0800)  Care Plan - Patient's Chronic Conditions and Co-Morbidity Symptoms are Monitored and Maintained or Improved: Monitor and assess patient's chronic conditions and comorbid symptoms for stability, deterioration, or improvement

## 2023-03-05 VITALS
OXYGEN SATURATION: 99 % | TEMPERATURE: 97.6 F | DIASTOLIC BLOOD PRESSURE: 61 MMHG | HEIGHT: 72 IN | WEIGHT: 145.72 LBS | SYSTOLIC BLOOD PRESSURE: 94 MMHG | RESPIRATION RATE: 18 BRPM | BODY MASS INDEX: 19.74 KG/M2 | HEART RATE: 101 BPM

## 2023-03-05 LAB
GLUCOSE BLD STRIP.AUTO-MCNC: 101 MG/DL (ref 70–110)
GLUCOSE BLD STRIP.AUTO-MCNC: 116 MG/DL (ref 70–110)
GLUCOSE BLD STRIP.AUTO-MCNC: 125 MG/DL (ref 70–110)

## 2023-03-05 PROCEDURE — 6360000002 HC RX W HCPCS: Performed by: HOSPITALIST

## 2023-03-05 PROCEDURE — 6370000000 HC RX 637 (ALT 250 FOR IP): Performed by: INTERNAL MEDICINE

## 2023-03-05 PROCEDURE — 6360000002 HC RX W HCPCS: Performed by: INTERNAL MEDICINE

## 2023-03-05 PROCEDURE — 2580000003 HC RX 258: Performed by: HOSPITALIST

## 2023-03-05 PROCEDURE — 6370000000 HC RX 637 (ALT 250 FOR IP): Performed by: HOSPITALIST

## 2023-03-05 PROCEDURE — 82962 GLUCOSE BLOOD TEST: CPT

## 2023-03-05 RX ORDER — DOXYCYCLINE 100 MG/1
100 CAPSULE ORAL EVERY 12 HOURS SCHEDULED
Qty: 5 CAPSULE | Refills: 0 | Status: SHIPPED | OUTPATIENT
Start: 2023-03-05 | End: 2023-03-08

## 2023-03-05 RX ORDER — NICOTINE 21 MG/24HR
1 PATCH, TRANSDERMAL 24 HOURS TRANSDERMAL DAILY
Qty: 30 PATCH | Refills: 0 | Status: SHIPPED | OUTPATIENT
Start: 2023-03-06

## 2023-03-05 RX ADMIN — CEFTRIAXONE 1000 MG: 1 INJECTION, POWDER, FOR SOLUTION INTRAMUSCULAR; INTRAVENOUS at 17:56

## 2023-03-05 RX ADMIN — FUROSEMIDE 20 MG: 10 INJECTION, SOLUTION INTRAMUSCULAR; INTRAVENOUS at 17:56

## 2023-03-05 RX ADMIN — METOPROLOL TARTRATE 12.5 MG: 25 TABLET, FILM COATED ORAL at 10:19

## 2023-03-05 RX ADMIN — FUROSEMIDE 20 MG: 10 INJECTION, SOLUTION INTRAMUSCULAR; INTRAVENOUS at 10:20

## 2023-03-05 RX ADMIN — SODIUM CHLORIDE, PRESERVATIVE FREE 10 ML: 5 INJECTION INTRAVENOUS at 10:21

## 2023-03-05 RX ADMIN — DOXYCYCLINE 100 MG: 100 CAPSULE ORAL at 10:20

## 2023-03-05 NOTE — PROGRESS NOTES
CM notified by unit secretary that patient is likely to d/c today and wants to change MPOA to niece from daughter. CM paged on all  to assist with completing change of MPOA ppwk.

## 2023-03-05 NOTE — DISCHARGE SUMMARY
Discharge Summary    Patient: Manjit Wright MRN: [id number]  CSN: [id number]    YOB: 1970  Age: 45 y.o. Sex: male    DOA: 2/28/2023 LOS:  LOS: 5 days   Discharge Date: 3/5/2023     Primary Care Provider:  None None    Admission Diagnoses: Other chest pain [R07.89]  Arrhythmia [I49.9]  Acute pulmonary edema (HCC) [J81.0]  Other cardiac arrhythmia [I49.8]  Acute on chronic congestive heart failure, unspecified heart failure type ([facility] [address] [I50.9]    Discharge Diagnoses: Active Hospital Problems    Diagnosis Date Noted    HCV (hepatitis C virus) [B19.20] 03/01/2023     Priority: Medium    Arrhythmia [I49.9] 02/28/2023     Priority: Medium    Lung infiltrate [R91.8] 02/28/2023     Priority: Medium    Smoker [F17.200] 02/28/2023     Priority: Medium    HTN (hypertension) [I10] 02/28/2023     Priority: Medium    DM (diabetes mellitus) ([facility] [address] [E11.9] 11/28/2022    Acute on chronic combined systolic and diastolic congestive heart failure ([facility] [I50.43] 11/27/2022    Cardiomyopathy ([facility] [I42.9] 11/27/2022    Nonrheumatic mitral valve regurgitation [I34.0] 11/27/2022       Discharge Medications:        Medication List        START taking these medications      metoprolol tartrate 25 MG tablet  Commonly known as: LOPRESSOR  Take 0.5 tablets by mouth 2 times daily     nicotine 14 MG/24HR  Commonly known as: NICODERM CQ  Place 1 patch onto the skin daily            CONTINUE taking these medications      furosemide 40 MG tablet  Commonly known as: LASIX     metFORMIN 1000 MG tablet  Commonly known as: GLUCOPHAGE            STOP taking these medications      carvedilol 3.125 MG tablet  Commonly known as: COREG            ASK your doctor about these medications      doxycycline monohydrate 100 MG capsule  Commonly known as: MONODOX  Take 1 capsule by mouth every 12 hours for 5 doses  Ask about: Should I take this medication?                Where to Get Your Medications        These medications were sent to [id number] [address], [facility] [address] [phone] - F [phone]  [address] [place] [address]      Phone: [phone]   doxycycline monohydrate 100 MG capsule  metoprolol tartrate 25 MG tablet  nicotine 14 MG/24HR         Discharge Condition: Good    Procedures : none     Consults: Cardiology, Palliative Care       PHYSICAL EXAM   Visit Vitals  BP 94/61   Pulse (!) 101   Temp 97.6 °F (36.4 °C) (Oral)   Resp 18   Ht 6' (1.829 m)   Wt 145 lb 11.6 oz (66.1 kg)   SpO2 99%   BMI 19.76 kg/m²     General: Awake, cooperative, no acute distress    HEENT: NC, Atraumatic. PERRLA, EOMI. Anicteric sclerae. Lungs:  CTA Bilaterally. No Wheezing/Rhonchi/Rales. Heart:  Regular  rhythm,  No murmur, No Rubs, No Gallops  Abdomen: Soft, Non distended, Non tender. +Bowel sounds,   Extremities: No c/c/e  Psych:   Not anxious or agitated. Neurologic:  No acute neurological deficits. Admission HPI : Cheyenne Mccullough is a 45 y.o. male with history of  diabetes, hypertension, CHF, smoker presented to ER due to worsening shortness of breath for 2 to 3 days. He has been shortness of breath for several months. Worsening today. Denies any leg swelling. He was discharged last November, not seeing cardiologist.  He said he will have a cardiologist appointment tomorrow. Not sure whom  he will see. In the ER, he was found 7 beats of V. tach. Patient have no symptoms. Chest x-ray indicated pulmonary edema versus infection. He did reported some chills and subjective  fever at home. Urine drug screen negative, his BNP >4500. 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 Denies any chest pain, troponin x2 negative.     Hospital Course :     Acute on chronic combined diastolic and systolic heart failure  Repeated echo with ef 15-20 %  Dr. Shirley Clancy following   Continue low salt diet and fluid restriction     Arrhythmia-due to low EF  PVC from cardiac monitor, non- SVT 7 beats in ER   Cardiac monitor optimize electrolytes  Urine drug screen negative     Lung infiltration  Rocephin inpt and DC home on doxycycline     Nonischemic cardiomyopathy -  Continue Lasix to 20 mg IV twice a day due to borderline blood pressure. Continue Metoprolol tartrate 12.5 mg twice a day  ACE inhibitor/ ARB and spironolactone as per heart rate/ blood pressure response. Monitor renal function and electrolytes. Keep potassium level around 4 and magnesium level around 2   Input output  Salt restriction up to 2 g per day and fluid restriction up to 1.2-1.4 L per day. Echocardiogram was done on 02/28/2023 revealed       Left Ventricle: Severely reduced left ventricular systolic function with a visually estimated EF of 15 - 20%. Left ventricle is dilated. Normal wall thickness. Severe global hypokinesis present. Grade III diastolic dysfunction with increased LAP. Right Ventricle: Not well visualized. Right ventricle is mildly dilated. Mildly reduced systolic function. Left Atrium: Left atrium is moderately dilated. Right Atrium: Right atrium is mildly dilated. Mitral Valve: Thickened leaflet. Severe regurgitation. Tricuspid Valve: Moderate regurgitation. The estimated RVSP is 56 mmHg. Smoker  Continue educate patient nicotine patch     HCV-  Found out one week prior to admision-   Needs to follow up with Dr. Stephanie Prado OP     6.3 cm x 6.4 cm mildly hypointense left renal lesion from ct   Mri done recommend to repeat imaging in 6 months      Change MPOA to his niece, Mariah Romeo, to be his new MPOA and not his daughter   [phone], [phone]      Activity: activity as tolerated    Diet: cardiac diet with Salt restriction up to 2 g per day and fluid restriction up to 1.2-1.4 L per day.      Follow-up:   Make OP appt with Dr. Stephanie Prado  Make OP appt with PCP in 2-3 days   Make OP appt with primary cardiologist   Make OP appt with [facility] per pt request: [phone]    Disposition: Home     Minutes spent on discharge: 45 minutes      Labs: Results:       Chemistry No results for input(s): GLUCOSE, NA, K, CL, CO2, BUN, CREATININE, CALCIUM, AGAP, BILITOT, AST, ALT, ALKPHOS, PROT, GLOB, AGRATIO, LABGLOM, GFRAA in the last 72 hours. Invalid input(s): Barbara Alonzo     CBC w/Diff No results for input(s): WBC, RBC, HGB, HCT, PLT, GRAN, LYMPHOPCT in the last 72 hours. Invalid input(s): EOS     Cardiac Enzymes No results for input(s): CKTOTAL, CKMB, CKMBINDEX, TROPONINI, THELMA in the last 72 hours. Coagulation No results for input(s): PROTIME, INR, APTT in the last 72 hours. Lipid Panel No results found for: CHOL, TRIG, HDL, LDLCHOLESTEROL, LDLCALC, LABVLDL, VLDL, CHOLHDLRATIO   BNP No results for input(s): BNP in the last 72 hours. Liver Enzymes No results for input(s): ALT, AST, GGT, ALKPHOS, BILITOT, BILIDIR in the last 72 hours. Thyroid Studies No results found for: L9GSMCC, S2RSCEJ, FT3, T4FREE, TSH         Significant Diagnostic Studies: CT HEAD WO CONTRAST    Result Date: 3/1/2023  EXAM: CT HEAD WO CONTRAST INDICATION: sleepy COMPARISON: None. CONTRAST: None. TECHNIQUE: Unenhanced CT of the head was performed using 5 mm images. Brain and bone windows were generated. Coronal and sagittal reformats. CT dose reduction was achieved through use of a standardized protocol tailored for this examination and automatic exposure control for dose modulation. FINDINGS: The bone windows demonstrate no abnormalities. The visualized portions of the paranasal sinuses and mastoid air cells are clear. The ventricles and sulci are age-appropriate and symmetric. Barnetta Mclaughlin The basilar cisterns are open. There is no intracranial hemorrhage, extra-axial collection, or mass effect. No CT evidence of acute infarct. No acute intracranial findings.       CTA CHEST W WO CONTRAST    Result Date: 3/1/2023  EXAM:  CTA CHEST W WO CONTRAST INDICATION: Hypotension, shortness of breath, rule out PE COMPARISON: None. TECHNIQUE: Helical thin section chest CT following uneventful intravenous administration of nonionic contrast 100 mL of isovue 370 according to departmental PE protocol. Coronal and sagittal reformats were performed. 3D post processing was performed. CT dose reduction was achieved through the use of a standardized protocol tailored for this examination and automatic exposure control for dose modulation. FINDINGS: This is a suboptimal quality study for the evaluation of pulmonary embolism to the lobar arterial level. There is no pulmonary embolism to this level. THYROID: No nodule. MEDIASTINUM: No mass or lymphadenopathy. MAL: No mass or lymphadenopathy. THORACIC AORTA: No aneurysm. HEART: Moderately enlarged in size. ESOPHAGUS: No wall thickening or dilatation. TRACHEA/BRONCHI: Patent. PLEURA: No effusion or pneumothorax. LUNGS: No nodule, mass, or airspace disease. UPPER ABDOMEN: Separately reported BONES: No aggressive bone lesion or fracture. 1.  Suboptimal contrast bolus. 2.  No central pulmonary emboli. Clear lungs. 3.  Moderate cardiomegaly. MRI ABDOMEN W WO CONTRAST    Result Date: 3/2/2023  EXAM:  MRI ABDOMEN W WO CONTRAST INDICATION: Renal mass COMPARISON: Performed on 3/1/2023 TECHNIQUE: Coronal T2 and postcontrast T1; Axial T2, in/out of phase, MRCP, pre- and dynamic postcontrast T1 MRI of the abdomen. 20 mL of MultiHance. Subtractions were performed. FINDINGS: Evaluation is limited by motion. Liver: No focal liver lesions. Biliary tree: Gallbladder is unremarkable. No biliary dilatation. Pancreas: Unremarkable Spleen: Unremarkable Adrenal glands: Unremarkable Kidneys: T2 hyperintense lesion in the left kidney measuring 6.3 x 6.5 x 5.2 cm. . Enhancing septations are noted which are mostly thin 1 thicker area of septation superiorly. . Vasculature: Unremarkable Bowel: Unremarkable Lymph nodes: No lymphadenopathy.  Miscellaneous: None     T2 hyperintense lesion in the left kidney which demonstrates enhancing septations. Follow-up MRI is recommended in 6 months. CT ABDOMEN PELVIS W IV CONTRAST Additional Contrast? None    Result Date: 3/1/2023  INDICATION:hypotension, looking for source of infection CT of the abdomen and pelvis is performed with 5 mm collimation. Study is performed with 100 cc of nonionic Isovue 370. Sagittal and coronal reformatted images were also performed. CT dose reduction was achieved with the use of the standardized protocol tailored for this examination and automatic exposure control for dose modulation. Direct comparison is made to prior CT of the chest, including the upper abdomen, dated November 2022. Findings: Lung bases: Visualized lung bases are clear. Visualized heart is enlarged. Liver: The liver is normal. Adrenals: Adrenal glands are normal. Pancreas: The pancreas is normal. Gallbladder: The gallbladder is normal. Kidneys: There is no hydronephrosis. There is a 6.3 cm x 6.4 cm mildly hypodense left renal lesion containing at least one thin septation. Spleen: The spleen is normal. Lymph nodes. There is no shahzad hepatitis, mesenteric, retroperitoneal or pelvic lymphadenopathy. Bowel: No thickened or dilated loop of large or small bowel is visualized. Appendix: The appendix is normal. Urinary bladder: Urinary bladder is partially filled and grossly normal. Miscellaneous: There is no free intraperitoneal fluid or gas. There is no focal fluid collection to suggest abscess. 1. No bowel obstruction, ileus or perforation. No intra-abdominal abscess. 2. 6.3 cm x 6.4 cm mildly hypointense left renal lesion containing at least one thin septation. Recommend dedicated renal mass CT or MRI for further    XR CHEST PORTABLE    Result Date: 2/28/2023  INDICATION: Shortness of Breath EXAM:  AP CHEST RADIOGRAPH COMPARISON: January 16, 2023 FINDINGS: AP portable view of the chest demonstrates a normal cardiomediastinal silhouette. Diffuse interstitial and airspace opacities. No pleural effusion or pneumothorax. The osseous structures are unremarkable. Diffuse interstitial and airspace opacities may represent pulmonary edema and/or pneumonia. Transthoracic echocardiogram (TTE) complete with contrast, bubble, strain, and 3D PRN    Result Date: 2/28/2023    Left Ventricle: Severely reduced left ventricular systolic function with a visually estimated EF of 15 - 20%. Left ventricle is dilated. Normal wall thickness. Severe global hypokinesis present. Grade III diastolic dysfunction with increased LAP. Right Ventricle: Not well visualized. Right ventricle is mildly dilated. Mildly reduced systolic function. Left Atrium: Left atrium is moderately dilated. Right Atrium: Right atrium is mildly dilated. Mitral Valve: Thickened leaflet. Severe regurgitation. Tricuspid Valve: Moderate regurgitation. The estimated RVSP is 56 mmHg. No results found for this or any previous visit.            CC: None None

## 2023-03-05 NOTE — PROGRESS NOTES
D/C Plan: Discharge home with family to drive within the next 24 hours    Patient verified he has his new MPOA documents and he has a ride available at discharge.

## 2023-03-05 NOTE — PROGRESS NOTES
Advance Care Planning     Advance Care Planning Inpatient Note  Spiritual Care Department    Today's Date: 3/5/2023  Unit: THE [address] CARDIAC/MEDICAL    Received request from patient. Upon review of chart and communication with care team, patient's decision making abilities are not in question. . Patient was/were present in the room during visit. Goals of ACP Conversation:  Discuss advance care planning documents    Health Care Decision Makers:       Primary Decision Maker: Sandi Nevarez (Daughter) - Child - [phone]  Summary:  Documented Next of Kin, per patient report    Advance Care Planning Documents (Patient Wishes):  Healthcare Power of /Advance Directive Appointment of Health Care Agent     Assessment:   addressed Advance Medical Directives with the patient. Patient stated that he wanted to change his Healthcare Agent from his daughter to his niece. His niece was present via Facetime. Chaplains and MD explained that patient and family should have a conversation on going conversation around AMD. Pt wanted to take AMD home to discuss with his family.  completed visit with patient and offered Pastoral care. Chaplains will continue to follow and will provide pastoral care as needed or requested.      Interventions:  Encouraged ongoing ACP conversation with future decision makers and loved ones    Care Preferences Communicated:   No    Outcomes/Plan:  ACP Discussion: Postponed    Electronically signed by Torsten King on 3/5/2023 at 3:17 PM

## 2023-03-07 NOTE — PROGRESS NOTES
Physician Progress Note      PATIENT:               Thomasine Nyhan  CSN #:                  [id number]  :                       1970  ADMIT DATE:       2023 6:13 AM  DISCH DATE:        3/5/2023 8:12 PM  RESPONDING  PROVIDER #:        Jackson Hinton MD          QUERY TEXT:    Patient admitted with CHF. Documentation reflects lung infiltrate in H&P   progress note dated lung infiltrate. CXR clear. If possible, please document   in the progress notes and discharge summary if pneumonia was: The medical record reflects the following:  Risk Factors: CHF, worsening SOB  Clinical Indicators: 3/1/ Hosp/ Lung infiltration-symptom treatment  Treatment: Rocephin doxycycline  Thank You  Roland Salazar RN, CDI, CRCR  Options provided:  -- Possible pneumonia confirmed  -- Pneumonia ruled out after study  -- Other - I will add my own diagnosis  -- Disagree - Not applicable / Not valid  -- Disagree - Clinically unable to determine / Unknown  -- Refer to Clinical Documentation Reviewer    PROVIDER RESPONSE TEXT:    Possible pneumonia confirmed.     Query created by: Ovidio Berger on 3/3/2023 4:24 PM      Electronically signed by:  Jackson Hinton MD 3/7/2023 4:07 PM

## (undated) DEVICE — CATHETER DIAG AD 5FR L100CM COR NYL JUDKINS R 5 DILATED

## (undated) DEVICE — GLIDESHEATH SLENDER STAINLESS STEEL KIT: Brand: GLIDESHEATH SLENDER

## (undated) DEVICE — CATH DIAG FR4 EXPO 5FRX100CM -- EXPO

## (undated) DEVICE — SHIELD RAD 14X16 IN W/ SCOOP ABSORBER

## (undated) DEVICE — PACK PROCEDURE SURG CATH CUST

## (undated) DEVICE — PROCEDURE KIT FLUID MGMT 10 FR CUST MAINFOLD

## (undated) DEVICE — DRAPE,ANGIO,BRACH,STERILE,38X44: Brand: MEDLINE

## (undated) DEVICE — STOPCOCK TRNSDUC 500PSI 3 W ROT M LUER LT BLU OFF HNDL R

## (undated) DEVICE — PRESSURE MONITORING SET: Brand: TRUWAVE

## (undated) DEVICE — TUBING PRSS MON L24IN PVC RIG NONEXPANDING M TO FEM CONN

## (undated) DEVICE — SWAN-GANZ POLYMER BLEND TRUE SIZE C-TIP CONTROLCATH TD CATHETER: Brand: SWAN-GANZ CONTROLCATH TRUE SIZE

## (undated) DEVICE — SENSOR PLSE OXMTR AD CBL L36IN ADH FRM FIT SPO2 DISP

## (undated) DEVICE — GUIDEWIRE VASC L260CM DIA0.035IN RAD 3MM J TIP L7CM PTFE

## (undated) DEVICE — GUIDEWIRE VASC L150CM DIA0.025IN TIP L7CM J RAD 3MM PTFE

## (undated) DEVICE — GLIDESHEATH SLENDER ACCESS KIT: Brand: GLIDESHEATH SLENDER

## (undated) DEVICE — Device

## (undated) DEVICE — TORCON NB, ADVANTAGE CATHETER: Brand: TORCON NB

## (undated) DEVICE — SPLINT WR POS F/ARTERIAL ACC -- BX/10